# Patient Record
Sex: FEMALE | Race: BLACK OR AFRICAN AMERICAN | NOT HISPANIC OR LATINO | Employment: FULL TIME | URBAN - METROPOLITAN AREA
[De-identification: names, ages, dates, MRNs, and addresses within clinical notes are randomized per-mention and may not be internally consistent; named-entity substitution may affect disease eponyms.]

---

## 2018-03-21 ENCOUNTER — OFFICE VISIT CONVERTED (OUTPATIENT)
Dept: FAMILY MEDICINE CLINIC | Facility: CLINIC | Age: 33
End: 2018-03-21
Attending: NURSE PRACTITIONER

## 2018-06-27 ENCOUNTER — OFFICE VISIT CONVERTED (OUTPATIENT)
Dept: FAMILY MEDICINE CLINIC | Facility: CLINIC | Age: 33
End: 2018-06-27
Attending: NURSE PRACTITIONER

## 2019-03-21 ENCOUNTER — OFFICE VISIT CONVERTED (OUTPATIENT)
Dept: FAMILY MEDICINE CLINIC | Facility: CLINIC | Age: 34
End: 2019-03-21
Attending: NURSE PRACTITIONER

## 2019-04-24 ENCOUNTER — OFFICE VISIT CONVERTED (OUTPATIENT)
Dept: FAMILY MEDICINE CLINIC | Facility: CLINIC | Age: 34
End: 2019-04-24
Attending: NURSE PRACTITIONER

## 2019-04-24 ENCOUNTER — HOSPITAL ENCOUNTER (OUTPATIENT)
Dept: FAMILY MEDICINE CLINIC | Facility: CLINIC | Age: 34
Discharge: HOME | End: 2019-04-24
Attending: NURSE PRACTITIONER

## 2019-04-24 LAB
25(OH)D3 SERPL-MCNC: 19.9 NG/ML (ref 30–100)
ALBUMIN SERPL-MCNC: 4.8 G/DL (ref 3.5–5)
ALBUMIN/GLOB SERPL: 1.6 {RATIO} (ref 1.4–2.6)
ALP SERPL-CCNC: 52 U/L (ref 42–98)
ALT SERPL-CCNC: 14 U/L (ref 10–40)
ANION GAP SERPL CALC-SCNC: 15 MMOL/L (ref 8–19)
AST SERPL-CCNC: 24 U/L (ref 15–50)
BASOPHILS # BLD AUTO: 0.03 10*3/UL (ref 0–0.2)
BASOPHILS NFR BLD AUTO: 0.6 % (ref 0–3)
BILIRUB SERPL-MCNC: 0.37 MG/DL (ref 0.2–1.3)
BUN SERPL-MCNC: 9 MG/DL (ref 5–25)
BUN/CREAT SERPL: 10 {RATIO} (ref 6–20)
CALCIUM SERPL-MCNC: 9.8 MG/DL (ref 8.7–10.4)
CHLORIDE SERPL-SCNC: 104 MMOL/L (ref 99–111)
CK SERPL-CCNC: 99 U/L (ref 35–230)
CONV ABS IMM GRAN: 0 10*3/UL (ref 0–0.2)
CONV CO2: 26 MMOL/L (ref 22–32)
CONV IMMATURE GRAN: 0 % (ref 0–1.8)
CONV TOTAL PROTEIN: 7.8 G/DL (ref 6.3–8.2)
CREAT UR-MCNC: 0.9 MG/DL (ref 0.5–0.9)
DEPRECATED RDW RBC AUTO: 47.5 FL (ref 36.4–46.3)
EOSINOPHIL # BLD AUTO: 0.14 10*3/UL (ref 0–0.7)
EOSINOPHIL # BLD AUTO: 2.7 % (ref 0–7)
ERYTHROCYTE [DISTWIDTH] IN BLOOD BY AUTOMATED COUNT: 14 % (ref 11.7–14.4)
GFR SERPLBLD BASED ON 1.73 SQ M-ARVRAT: >60 ML/MIN/{1.73_M2}
GLOBULIN UR ELPH-MCNC: 3 G/DL (ref 2–3.5)
GLUCOSE SERPL-MCNC: 71 MG/DL (ref 65–99)
HBA1C MFR BLD: 13.4 G/DL (ref 12–16)
HCT VFR BLD AUTO: 42.2 % (ref 37–47)
IRON SATN MFR SERPL: 22 % (ref 20–55)
IRON SERPL-MCNC: 91 UG/DL (ref 60–170)
LYMPHOCYTES # BLD AUTO: 1.51 10*3/UL (ref 1–5)
MCH RBC QN AUTO: 29.3 PG (ref 27–31)
MCHC RBC AUTO-ENTMCNC: 31.8 G/DL (ref 33–37)
MCV RBC AUTO: 92.1 FL (ref 81–99)
MONOCYTES # BLD AUTO: 0.59 10*3/UL (ref 0.2–1.2)
MONOCYTES NFR BLD AUTO: 11.4 % (ref 3–10)
NEUTROPHILS # BLD AUTO: 2.91 10*3/UL (ref 2–8)
NEUTROPHILS NFR BLD AUTO: 56.1 % (ref 30–85)
NRBC CBCN: 0 % (ref 0–0.7)
OSMOLALITY SERPL CALC.SUM OF ELEC: 289 MOSM/KG (ref 273–304)
PLATELET # BLD AUTO: 232 10*3/UL (ref 130–400)
PMV BLD AUTO: 12.2 FL (ref 9.4–12.3)
POTASSIUM SERPL-SCNC: 4 MMOL/L (ref 3.5–5.3)
RBC # BLD AUTO: 4.58 10*6/UL (ref 4.2–5.4)
SODIUM SERPL-SCNC: 141 MMOL/L (ref 135–147)
TIBC SERPL-MCNC: 410 UG/DL (ref 245–450)
TRANSFERRIN SERPL-MCNC: 287 MG/DL (ref 250–380)
TSH SERPL-ACNC: 0.84 M[IU]/L (ref 0.27–4.2)
VARIANT LYMPHS NFR BLD MANUAL: 29.2 % (ref 20–45)
VIT B12 SERPL-MCNC: 1045 PG/ML (ref 211–911)
WBC # BLD AUTO: 5.18 10*3/UL (ref 4.8–10.8)

## 2019-04-25 LAB — CONV ANTI NUCLEAR ANTIBODY WITH REFLEX: NEGATIVE

## 2019-04-30 ENCOUNTER — HOSPITAL ENCOUNTER (OUTPATIENT)
Dept: MRI IMAGING | Facility: HOSPITAL | Age: 34
Discharge: HOME | End: 2019-04-30
Attending: NURSE PRACTITIONER

## 2019-05-29 ENCOUNTER — OFFICE VISIT CONVERTED (OUTPATIENT)
Dept: ORTHOPEDIC SURGERY | Facility: CLINIC | Age: 34
End: 2019-05-29
Attending: ORTHOPAEDIC SURGERY

## 2019-06-12 ENCOUNTER — OFFICE VISIT CONVERTED (OUTPATIENT)
Dept: FAMILY MEDICINE CLINIC | Facility: CLINIC | Age: 34
End: 2019-06-12
Attending: NURSE PRACTITIONER

## 2019-07-17 ENCOUNTER — OFFICE VISIT CONVERTED (OUTPATIENT)
Dept: ORTHOPEDIC SURGERY | Facility: CLINIC | Age: 34
End: 2019-07-17
Attending: ORTHOPAEDIC SURGERY

## 2019-10-06 ENCOUNTER — HOSPITAL ENCOUNTER (OUTPATIENT)
Dept: URGENT CARE | Facility: CLINIC | Age: 34
Discharge: HOME | End: 2019-10-06

## 2019-10-24 ENCOUNTER — HOSPITAL ENCOUNTER (OUTPATIENT)
Dept: FAMILY MEDICINE CLINIC | Facility: CLINIC | Age: 34
Discharge: HOME | End: 2019-10-24
Attending: NURSE PRACTITIONER

## 2019-10-24 ENCOUNTER — OFFICE VISIT CONVERTED (OUTPATIENT)
Dept: FAMILY MEDICINE CLINIC | Facility: CLINIC | Age: 34
End: 2019-10-24
Attending: NURSE PRACTITIONER

## 2019-10-24 LAB — 25(OH)D3 SERPL-MCNC: 30.8 NG/ML (ref 30–100)

## 2019-11-08 ENCOUNTER — HOSPITAL ENCOUNTER (OUTPATIENT)
Dept: URGENT CARE | Facility: CLINIC | Age: 34
Discharge: HOME | End: 2019-11-08

## 2019-11-20 ENCOUNTER — OFFICE VISIT CONVERTED (OUTPATIENT)
Dept: ORTHOPEDIC SURGERY | Facility: CLINIC | Age: 34
End: 2019-11-20
Attending: ORTHOPAEDIC SURGERY

## 2019-11-20 ENCOUNTER — CONVERSION ENCOUNTER (OUTPATIENT)
Dept: ORTHOPEDIC SURGERY | Facility: CLINIC | Age: 34
End: 2019-11-20

## 2019-12-12 ENCOUNTER — CONVERSION ENCOUNTER (OUTPATIENT)
Dept: FAMILY MEDICINE CLINIC | Facility: CLINIC | Age: 34
End: 2019-12-12

## 2019-12-12 ENCOUNTER — OFFICE VISIT CONVERTED (OUTPATIENT)
Dept: FAMILY MEDICINE CLINIC | Facility: CLINIC | Age: 34
End: 2019-12-12
Attending: NURSE PRACTITIONER

## 2019-12-12 ENCOUNTER — HOSPITAL ENCOUNTER (OUTPATIENT)
Dept: FAMILY MEDICINE CLINIC | Facility: CLINIC | Age: 34
Discharge: HOME | End: 2019-12-12
Attending: NURSE PRACTITIONER

## 2019-12-15 LAB
ASO AB SERPL-ACNC: 34 [IU]/ML (ref 0–200)
CONV RHEUMATOID FACTOR IGM: <10 [IU]/ML (ref 0–14)
CRP SERPL-MCNC: 0.5 MG/L (ref 0–5)
DSDNA AB SER-ACNC: NEGATIVE [IU]/ML
ENA AB SER IA-ACNC: NEGATIVE {RATIO}
URATE SERPL-MCNC: 2.9 MG/DL (ref 2.5–7.5)

## 2019-12-27 ENCOUNTER — HOSPITAL ENCOUNTER (OUTPATIENT)
Dept: MRI IMAGING | Facility: HOSPITAL | Age: 34
Discharge: HOME | End: 2019-12-27
Attending: NURSE PRACTITIONER

## 2020-01-15 ENCOUNTER — OFFICE VISIT CONVERTED (OUTPATIENT)
Dept: FAMILY MEDICINE CLINIC | Facility: CLINIC | Age: 35
End: 2020-01-15
Attending: NURSE PRACTITIONER

## 2020-01-18 ENCOUNTER — HOSPITAL ENCOUNTER (OUTPATIENT)
Dept: URGENT CARE | Facility: CLINIC | Age: 35
Discharge: HOME | End: 2020-01-18

## 2020-01-20 LAB — BACTERIA SPEC AEROBE CULT: NORMAL

## 2020-02-04 ENCOUNTER — OFFICE VISIT CONVERTED (OUTPATIENT)
Dept: ORTHOPEDIC SURGERY | Facility: CLINIC | Age: 35
End: 2020-02-04
Attending: PHYSICIAN ASSISTANT

## 2020-02-20 ENCOUNTER — OFFICE VISIT CONVERTED (OUTPATIENT)
Dept: FAMILY MEDICINE CLINIC | Facility: CLINIC | Age: 35
End: 2020-02-20
Attending: NURSE PRACTITIONER

## 2020-02-20 ENCOUNTER — HOSPITAL ENCOUNTER (OUTPATIENT)
Dept: FAMILY MEDICINE CLINIC | Facility: CLINIC | Age: 35
Discharge: HOME | End: 2020-02-20
Attending: NURSE PRACTITIONER

## 2020-02-20 LAB
T4 FREE SERPL-MCNC: 1.2 NG/DL (ref 0.9–1.8)
TSH SERPL-ACNC: 1.22 M[IU]/L (ref 0.27–4.2)

## 2020-03-11 ENCOUNTER — CONVERSION ENCOUNTER (OUTPATIENT)
Dept: NEUROLOGY | Facility: CLINIC | Age: 35
End: 2020-03-11

## 2020-03-11 ENCOUNTER — OFFICE VISIT CONVERTED (OUTPATIENT)
Dept: NEUROLOGY | Facility: CLINIC | Age: 35
End: 2020-03-11
Attending: PSYCHIATRY & NEUROLOGY

## 2020-03-17 ENCOUNTER — OFFICE VISIT CONVERTED (OUTPATIENT)
Dept: ORTHOPEDIC SURGERY | Facility: CLINIC | Age: 35
End: 2020-03-17
Attending: PHYSICIAN ASSISTANT

## 2020-04-24 ENCOUNTER — HOSPITAL ENCOUNTER (OUTPATIENT)
Dept: URGENT CARE | Facility: CLINIC | Age: 35
Discharge: HOME | End: 2020-04-24
Attending: EMERGENCY MEDICINE

## 2020-06-19 ENCOUNTER — OFFICE VISIT CONVERTED (OUTPATIENT)
Dept: ORTHOPEDIC SURGERY | Facility: CLINIC | Age: 35
End: 2020-06-19
Attending: PHYSICIAN ASSISTANT

## 2020-08-20 ENCOUNTER — OFFICE VISIT CONVERTED (OUTPATIENT)
Dept: FAMILY MEDICINE CLINIC | Facility: CLINIC | Age: 35
End: 2020-08-20
Attending: NURSE PRACTITIONER

## 2020-09-23 ENCOUNTER — OFFICE VISIT CONVERTED (OUTPATIENT)
Dept: FAMILY MEDICINE CLINIC | Facility: CLINIC | Age: 35
End: 2020-09-23
Attending: NURSE PRACTITIONER

## 2020-10-04 ENCOUNTER — HOSPITAL ENCOUNTER (OUTPATIENT)
Dept: URGENT CARE | Facility: CLINIC | Age: 35
Discharge: HOME | End: 2020-10-04

## 2020-10-06 LAB
BACTERIA SPEC AEROBE CULT: NORMAL
SARS-COV-2 RNA SPEC QL NAA+PROBE: NOT DETECTED

## 2021-01-25 ENCOUNTER — HOSPITAL ENCOUNTER (OUTPATIENT)
Dept: URGENT CARE | Facility: CLINIC | Age: 36
Discharge: HOME | End: 2021-01-25
Attending: PHYSICIAN ASSISTANT

## 2021-01-27 LAB — SARS-COV-2 RNA SPEC QL NAA+PROBE: NOT DETECTED

## 2021-02-24 ENCOUNTER — OFFICE VISIT CONVERTED (OUTPATIENT)
Dept: FAMILY MEDICINE CLINIC | Facility: CLINIC | Age: 36
End: 2021-02-24
Attending: NURSE PRACTITIONER

## 2021-02-24 ENCOUNTER — HOSPITAL ENCOUNTER (OUTPATIENT)
Dept: FAMILY MEDICINE CLINIC | Facility: CLINIC | Age: 36
Discharge: HOME | End: 2021-02-24
Attending: NURSE PRACTITIONER

## 2021-02-24 LAB
ALBUMIN SERPL-MCNC: 4.4 G/DL (ref 3.5–5)
ALBUMIN/GLOB SERPL: 1.7 {RATIO} (ref 1.4–2.6)
ALP SERPL-CCNC: 49 U/L (ref 42–98)
ALT SERPL-CCNC: 11 U/L (ref 10–40)
ANION GAP SERPL CALC-SCNC: 13 MMOL/L (ref 8–19)
AST SERPL-CCNC: 23 U/L (ref 15–50)
BASOPHILS # BLD AUTO: 0.03 10*3/UL (ref 0–0.2)
BASOPHILS NFR BLD AUTO: 0.7 % (ref 0–3)
BILIRUB SERPL-MCNC: 0.35 MG/DL (ref 0.2–1.3)
BUN SERPL-MCNC: 13 MG/DL (ref 5–25)
BUN/CREAT SERPL: 14 {RATIO} (ref 6–20)
CALCIUM SERPL-MCNC: 9.5 MG/DL (ref 8.7–10.4)
CHLORIDE SERPL-SCNC: 105 MMOL/L (ref 99–111)
CHOLEST SERPL-MCNC: 190 MG/DL (ref 107–200)
CHOLEST/HDLC SERPL: 1.9 {RATIO} (ref 3–6)
CONV ABS IMM GRAN: 0.01 10*3/UL (ref 0–0.2)
CONV CO2: 26 MMOL/L (ref 22–32)
CONV IMMATURE GRAN: 0.2 % (ref 0–1.8)
CONV TOTAL PROTEIN: 7 G/DL (ref 6.3–8.2)
CREAT UR-MCNC: 0.91 MG/DL (ref 0.5–0.9)
DEPRECATED RDW RBC AUTO: 46.7 FL (ref 36.4–46.3)
EOSINOPHIL # BLD AUTO: 0.1 10*3/UL (ref 0–0.7)
EOSINOPHIL # BLD AUTO: 2.3 % (ref 0–7)
ERYTHROCYTE [DISTWIDTH] IN BLOOD BY AUTOMATED COUNT: 13.7 % (ref 11.7–14.4)
GFR SERPLBLD BASED ON 1.73 SQ M-ARVRAT: >60 ML/MIN/{1.73_M2}
GLOBULIN UR ELPH-MCNC: 2.6 G/DL (ref 2–3.5)
GLUCOSE SERPL-MCNC: 78 MG/DL (ref 65–99)
HCT VFR BLD AUTO: 38.4 % (ref 37–47)
HDLC SERPL-MCNC: 98 MG/DL (ref 40–60)
HGB BLD-MCNC: 12.5 G/DL (ref 12–16)
LDLC SERPL CALC-MCNC: 85 MG/DL (ref 70–100)
LYMPHOCYTES # BLD AUTO: 1.36 10*3/UL (ref 1–5)
LYMPHOCYTES NFR BLD AUTO: 31.2 % (ref 20–45)
MCH RBC QN AUTO: 29.8 PG (ref 27–31)
MCHC RBC AUTO-ENTMCNC: 32.6 G/DL (ref 33–37)
MCV RBC AUTO: 91.6 FL (ref 81–99)
MONOCYTES # BLD AUTO: 0.56 10*3/UL (ref 0.2–1.2)
MONOCYTES NFR BLD AUTO: 12.8 % (ref 3–10)
NEUTROPHILS # BLD AUTO: 2.3 10*3/UL (ref 2–8)
NEUTROPHILS NFR BLD AUTO: 52.8 % (ref 30–85)
NRBC CBCN: 0 % (ref 0–0.7)
OSMOLALITY SERPL CALC.SUM OF ELEC: 289 MOSM/KG (ref 273–304)
PLATELET # BLD AUTO: 270 10*3/UL (ref 130–400)
PMV BLD AUTO: 11.3 FL (ref 9.4–12.3)
POTASSIUM SERPL-SCNC: 4.2 MMOL/L (ref 3.5–5.3)
RBC # BLD AUTO: 4.19 10*6/UL (ref 4.2–5.4)
SODIUM SERPL-SCNC: 140 MMOL/L (ref 135–147)
TRIGL SERPL-MCNC: 35 MG/DL (ref 40–150)
TSH SERPL-ACNC: 0.91 M[IU]/L (ref 0.27–4.2)
VLDLC SERPL-MCNC: 7 MG/DL (ref 5–37)
WBC # BLD AUTO: 4.36 10*3/UL (ref 4.8–10.8)

## 2021-03-24 ENCOUNTER — OFFICE VISIT CONVERTED (OUTPATIENT)
Dept: FAMILY MEDICINE CLINIC | Facility: CLINIC | Age: 36
End: 2021-03-24
Attending: NURSE PRACTITIONER

## 2021-04-15 ENCOUNTER — HOSPITAL ENCOUNTER (OUTPATIENT)
Dept: CARDIOLOGY | Facility: HOSPITAL | Age: 36
Discharge: HOME | End: 2021-04-15
Attending: PSYCHIATRY & NEUROLOGY

## 2021-04-29 ENCOUNTER — HOSPITAL ENCOUNTER (OUTPATIENT)
Dept: MRI IMAGING | Facility: HOSPITAL | Age: 36
Discharge: HOME | End: 2021-04-29
Attending: PSYCHIATRY & NEUROLOGY

## 2021-05-13 NOTE — PROGRESS NOTES
"   Progress Note      Patient Name: Laurel Molina   Patient ID: [id number]   Sex: Female   YOB: 1985    Primary Care Provider: Rula MUSE   Referring Provider: Babita Adhikari MD    Visit Date: August 20, 2020    Provider: MICHA Muñiz   Location: [facility]   Location Address: [address] [place] [address]  [id number]   Location Phone: [phone]          Chief Complaint  · 6 Month Follow up for Anxiety and Depression      History Of Present Illness  Laurel Molina is a 35 year old /Black female who presents for evaluation and treatment of:      6 Month Follow up for Anxiety and Depression  Last lab work done 2/2020  Med refills needed    Pt scored a 18 on in-office depression screen today.  pt reports she is working still with the loss of her father.  pt reports difficulty with focus at work. \"no energy to do anything\".  pt has failed Vraylar and Zoloft in the past.     Cymbalta has helped with the chronic pain.            Past Medical History  Disease Name Date Onset Notes   Acid reflux --  --    Allergic rhinitis --  --    Anxiety and depression --  --    Arthritis --  --    Leg pain --  --    Seasonal allergies --  --    Sinus trouble --  --    Sleep disturbance --  --    Thumb fracture 2012 Right          Past Surgical History  Procedure Name Date Notes   Joint Surgery --  --    Knee reconstruction, open, ligament 2014 Left          Medication List  Name Date Started Instructions   clindamycin-benzoyl peroxide 1-5 % topical gel with pump 04/24/2019 apply to the affected area(s) by topical route 2 times per day in the morning and evening for 30 days   duloxetine 60 mg oral capsule,delayed release(DR/EC) 02/20/2020 take 2 capsules (120 mg) by oral route once daily for 90 days   hydroxyzine HCl 25 mg oral tablet 07/07/2020 TAKE 1 TABLET BY MOUTH AT BEDTIME AS NEEDED   ibuprofen 800 mg oral tablet 11/20/2019 TAKE 1 TABLET BY MOUTH TWICE A DAY " "  Zyrtec 10 mg oral tablet 03/21/2018 take 1 tablet by oral route once a day (at bedtime) for 90 days         Allergy List  Allergen Name Date Reaction Notes   naproxen --  --  --    NSAIDS --  --  --          Family Medical History  Disease Name Relative/Age Notes   Family history of Arthritis Brother/  Father/  Mother/   Mother; Father; Brother  Mother; Father   Family history of heart disease Brother/  Father/   Father; Brother   Family history of diabetes mellitus Brother/   Brother         Social History  Finding Status Start/Stop Quantity Notes   Alcohol Never --/-- --  03/11/2020 - does not drink   Alcohol Use Never --/-- --  does not drink   lives with parents --  --/-- --  --    Recreational Drug Use Never --/-- --  no   Single --  --/-- --  --    Single. --  --/-- --  --    Tobacco Never --/-- --  never smoker   Working --  --/-- --  --          Review of Systems  · Constitutional  o Denies  o : fatigue, fever, weight gain, weight loss, chills  · Cardiovascular  o Denies  o : chest Pain, palpitations, edema (swelling)  · Respiratory  o Denies  o : frequent cough, shortness of breath  · Gastrointestinal  o Denies  o : nausea, vomiting, changes in bowel habits  · Genitourinary  o Denies  o : dysuria, urinary frequency, urinary urgency, polyuria  · Psychiatric  o Admits  o : anxiety, depression  o Denies  o : mood changes, memory changes, SI/HI  · Allergic-Immunologic  o Admits  o : seasonal allergies  o Denies  o : eczema, urticaria      Vitals  Date Time BP Position Site L\R Cuff Size HR RR TEMP (F) WT  HT  BMI kg/m2 BSA m2 O2 Sat HC       02/20/2020 08:46 /79 Sitting    88 - R 12  129lbs 0oz 5'  3.5\" 22.49 1.62 99 %    03/11/2020 03:16 /86 Sitting    91 - R   126lbs 16oz 5'  3\" 22.5 1.6     08/20/2020 08:18 /86 Sitting    112 - R 12 98.3 129lbs 0oz 5'  3.5\" 22.49 1.62 100 %          Physical Examination  · Constitutional  o Appearance  o : well-nourished, in no acute " distress  · Eyes  o Conjunctivae  o : conjunctivae normal  o Sclerae  o : sclerae white  o Pupils and Irises  o : pupils equal and round  o Eyelids/Ocular Adnexae  o : eyelid appearance normal, no exudates present  · Neck  o Inspection/Palpation  o : normal appearance, no masses or tenderness, trachea midline  o Range of Motion  o : cervical range of motion within normal limits  o Thyroid  o : gland size normal, nontender, no nodules or masses present on palpation  · Respiratory  o Respiratory Effort  o : breathing unlabored  o Inspection of Chest  o : normal appearance  o Auscultation of Lungs  o : normal breath sounds throughout inspiration and expiration  · Cardiovascular  o Heart  o :   § Auscultation of Heart  § : regular rate and rhythm, no murmurs, gallops or rubs  o Peripheral Vascular System  o :   § Carotid Arteries  § : normal pulses bilaterally, no bruits present  · Skin and Subcutaneous Tissue  o General Inspection  o : no rashes or lesions present, no areas of discoloration  o Body Hair  o : hair normal for age, general body hair distribution normal for age  o Digits and Nails  o : no clubbing, cyanosis, deformities or edema present, normal appearing nails  · Neurologic  o Mental Status Examination  o :   § Orientation  § : grossly oriented to person, place and time  o Gait and Station  o : normal gait, able to stand without difficulty  · Psychiatric  o Judgement and Insight  o : judgment and insight intact  o Mood and Affect  o : mood normal, affect appropriate          Assessment  · Screening for depression     V79.0/Z13.89  · Anxiety disorder     300.00/F41.9  · Depression     311/F32.9      Plan  · Orders  o ACO-39: Current medications updated and reviewed () - - 08/20/2020  o ACO-18: Positive screen for clinical depression using a standardized tool and a follow-up plan documented () - - 08/20/2020  · Medications  o Wellbutrin  mg oral tablet extended release 24 hr   SIG: take 1  tablet (150 mg) by oral route once daily for 30 days   DISP: (30) tablets with 1 refills  Prescribed on 08/20/2020     o duloxetine 60 mg oral capsule,delayed release(DR/EC)   SIG: take 2 capsules (120 mg) by oral route once daily for 90 days   DISP: (180) Capsule with 1 refills  Refilled on 08/20/2020     o hydroxyzine HCl 25 mg oral tablet   SIG: TAKE 1 TABLET BY MOUTH AT BEDTIME AS NEEDED   DISP: (90) Tablet with 1 refills  Refilled on 08/20/2020     o meclizine 25 mg oral tablet   SIG: TAKE 1 TABLET BY MOUTH 3 TIMES A DAY AS NEEDED FOR 10 DAYS   DISP: (30) Tablet with 0 refills  Discontinued on 08/20/2020     o Medications have been Reconciled  o Transition of Care or Provider Policy  · Instructions  o Depression Screen completed and scanned into the EMR under the designated folder within the patient's documents.  o Today's PHQ-9 result is _18__  o Patient was given an SSRI/SSNRI medication and warned of possible side effects of the medication including potential for increased risk of suicidal thoughts and feelings. Patient was instructed that if they begin to exhibit any of these effects they will discontinue the medication immediately and contact our office or the ER ASAP.  o Patient was given an SSRI/SSNRI medication and warned of possible side effects of the medication including potential for increased risk of suicidal thoughts and feelings. Patient was instructed that if they begin to exhibit any of these effects they will discontinue the medication immediately and contact our office or the ER ASAP.  o Patient was educated/instructed on their diagnosis, treatment and medications prior to discharge from the clinic today.  o Call the office with any concerns or questions.  · Disposition  o Return to Office in 1 month.            Electronically Signed by: MICHA Muñiz -Author on August 20, 2020 08:35:19 AM

## 2021-05-13 NOTE — PROGRESS NOTES
Progress Note      Patient Name: Laurel Molina   Patient ID: [id number]   Sex: Female   YOB: 1985    Primary Care Provider: Rula MUSE   Referring Provider: Babita Adhikari MD    Visit Date: September 23, 2020    Provider: MICHA Muñiz   Location: [facility]   Location Address: [address] [place] [address]  [id number]   Location Phone: [phone]          Chief Complaint  · 1 Month Follow up for Anxiety and Depression      History Of Present Illness  Laurel Molina is a 35 year old /Black female who presents for evaluation and treatment of:      1 Month Follow up for Anxiety and Depression  Pt reported doing well on current medications    pt reports anxiety depression well controlled. pt reports taking Cymbalta at night and Wellbutrin qam. pt is functioning well. sleeping approx. 5-6 hours per night.     Left knee pain - chronic. pt takes ibuprofen with relief.       Past Medical History  Disease Name Date Onset Notes   Acid reflux --  --    Allergic rhinitis --  --    Anxiety and depression --  --    Arthritis --  --    Leg pain --  --    Seasonal allergies --  --    Sinus trouble --  --    Sleep disturbance --  --    Thumb fracture 2012 Right          Past Surgical History  Procedure Name Date Notes   Joint Surgery --  --    Knee reconstruction, open, ligament 2014 Left          Medication List  Name Date Started Instructions   clindamycin-benzoyl peroxide 1-5 % topical gel with pump 04/24/2019 apply to the affected area(s) by topical route 2 times per day in the morning and evening for 30 days   duloxetine 60 mg oral capsule,delayed release(DR/EC) 08/20/2020 take 2 capsules (120 mg) by oral route once daily for 90 days   hydroxyzine HCl 25 mg oral tablet 08/20/2020 TAKE 1 TABLET BY MOUTH AT BEDTIME AS NEEDED   ibuprofen 800 mg oral tablet 11/20/2019 TAKE 1 TABLET BY MOUTH TWICE A DAY   Wellbutrin  mg oral tablet  "extended release 24 hr 08/20/2020 take 1 tablet (150 mg) by oral route once daily for 30 days   Zyrtec 10 mg oral tablet 03/21/2018 take 1 tablet by oral route once a day (at bedtime) for 90 days         Allergy List  Allergen Name Date Reaction Notes   naproxen --  --  --    NSAIDS --  --  --          Family Medical History  Disease Name Relative/Age Notes   Family history of Arthritis Brother/  Father/  Mother/   Mother; Father; Brother  Mother; Father   Family history of heart disease Brother/  Father/   Father; Brother   Family history of diabetes mellitus Brother/   Brother         Social History  Finding Status Start/Stop Quantity Notes   Alcohol Never --/-- --  03/11/2020 - does not drink   Alcohol Use Never --/-- --  does not drink   lives with parents --  --/-- --  --    Recreational Drug Use Never --/-- --  no   Single --  --/-- --  --    Tobacco Never --/-- --  never smoker   Working --  --/-- --  --          Review of Systems  · Constitutional  o Denies  o : fatigue, fever, weight gain, weight loss, chills  · Cardiovascular  o Denies  o : chest Pain, palpitations, edema (swelling)  · Respiratory  o Denies  o : frequent cough, shortness of breath  · Gastrointestinal  o Denies  o : nausea, vomiting, changes in bowel habits  · Genitourinary  o Denies  o : dysuria, urinary frequency, urinary urgency, polyuria  · Neurologic  o Denies  o : headache, tingling or numbness, dizziness  · Musculoskeletal  o Admits  o : knee pain  o Denies  o : joint pain, myalgias  · Endocrine  o Denies  o : polydipsia, polyphagia  · Psychiatric  o Admits  o : anxiety, depression  o Denies  o : difficulty sleeping, mood changes, memory changes, SI/HI  · Allergic-Immunologic  o Denies  o : eczema, seasonal allergies, urticaria      Vitals  Date Time BP Position Site L\R Cuff Size HR RR TEMP (F) WT  HT  BMI kg/m2 BSA m2 O2 Sat        08/20/2020 08:18 /86 Sitting    112 - R 12 98.3 129lbs 0oz 5'  3.5\" 22.49 1.62 100 %  " "  09/23/2020 08:30 /83 Sitting    92 - R 12 98.4 128lbs 2oz 5'  3.5\" 22.34 1.61 99 %          Physical Examination  · Constitutional  o Appearance  o : well-nourished, in no acute distress  · Eyes  o Conjunctivae  o : conjunctivae normal  o Sclerae  o : sclerae white  o Pupils and Irises  o : pupils equal and round  o Eyelids/Ocular Adnexae  o : eyelid appearance normal, no exudates present  · Neck  o Inspection/Palpation  o : normal appearance, no masses or tenderness, trachea midline  o Range of Motion  o : cervical range of motion within normal limits  o Thyroid  o : gland size normal, nontender, no nodules or masses present on palpation  · Respiratory  o Respiratory Effort  o : breathing unlabored  o Inspection of Chest  o : normal appearance  o Auscultation of Lungs  o : normal breath sounds throughout inspiration and expiration  · Cardiovascular  o Heart  o :   § Auscultation of Heart  § : regular rate and rhythm, no murmurs, gallops or rubs  · Musculoskeletal  o Spine  o :   § Range of Motion  § : spine range of motion normal  o Right Lower Extremity  o :   § Inspection/Palpation  § : no joint or limb tenderness to palpation, ROM normal  o Left Lower Extremity  o :   § Inspection/Palpation  § : moderate tenderness to palpation present-knee, neurovascularly intact to L1-S1  · Skin and Subcutaneous Tissue  o General Inspection  o : no rashes or lesions present, no areas of discoloration  o Body Hair  o : hair normal for age, general body hair distribution normal for age  o Digits and Nails  o : no clubbing, cyanosis, deformities or edema present, normal appearing nails  · Neurologic  o Mental Status Examination  o :   § Orientation  § : grossly oriented to person, place and time  o Gait and Station  o : normal gait, able to stand without difficulty  · Psychiatric  o Judgement and Insight  o : judgment and insight intact  o Mood and Affect  o : mood normal, affect appropriate          Assessment  · Anxiety " disorder     300.00/F41.9  · Depression     311/F32.9  · Routine lab draw     V72.60/Z01.89      Plan  · Orders  o Physical, Primary Care Panel (CBC, CMP, Lipid, TSH) [facility] ([id number], [id number], [id number], [id number]) - V72.60/Z01.89 - 09/23/2020  o ACO-39: Current medications updated and reviewed () - - 09/23/2020  · Medications  o ibuprofen 800 mg oral tablet   SIG: TAKE 1 TABLET BY MOUTH TWICE A DAY PRN   DISP: (60) Tablet with 2 refills  Adjusted on 09/23/2020     o Wellbutrin  mg oral tablet extended release 24 hr   SIG: take 1 tablet (150 mg) by oral route once daily for 90 days   DISP: (90) tablets with 1 refills  Adjusted on 09/23/2020     o Medications have been Reconciled  o Transition of Care or Provider Policy  · Instructions  o Patient was given an SSRI/SSNRI medication and warned of possible side effects of the medication including potential for increased risk of suicidal thoughts and feelings. Patient was instructed that if they begin to exhibit any of these effects they will discontinue the medication immediately and contact our office or the ER ASAP.  o Patient was educated/instructed on their diagnosis, treatment and medications prior to discharge from the clinic today.  · Disposition  o Return Visit Request in/on 5 months +/- 7 days ([id number]).            Electronically Signed by: MICHA Muñiz -Author on September 23, 2020 08:49:02 AM

## 2021-05-13 NOTE — PROGRESS NOTES
Progress Note      Patient Name: Laurel Molina   Patient ID: [id number]   Sex: Female   YOB: 1985    Primary Care Provider: Rula MUSE   Referring Provider: Babita Adhikari MD    Visit Date: June 19, 2020    Provider: Deborah Max PA-C   Location: [facility]   Location Address: [address] [place] [address]  [id number]   Location Phone: [phone]          Chief Complaint  · Follow up left quad / left shoulder pain      History Of Present Illness  Laurel Molina is a 35 year old /Black female who presents today to [facility].      Patient is following up for left knee pain, left shoulder pain. Patient states history of left knee MPFL performed by Dr. Sheridan. Patient states pain is decreasing. Patient states steroid injection provided relief in left shoulder. Patient has had an MRI result partial thickness rotator cuff tear.              Past Medical History  Acid reflux; Allergic rhinitis; Anxiety and depression; Arthritis; Leg pain; Seasonal allergies; Sinus trouble; Sleep disturbance; Thumb fracture         Past Surgical History  Joint Surgery; Knee reconstruction, open, ligament         Medication List  clindamycin-benzoyl peroxide 1-5 % topical gel with pump; duloxetine 60 mg oral capsule,delayed release(DR/EC); hydroxyzine HCl 25 mg oral tablet; ibuprofen 800 mg oral tablet; meclizine 25 mg oral tablet; Zyrtec 10 mg oral tablet         Allergy List  naproxen; NSAIDS         Family Medical History  Heart Disease; *No Known Family History; Family history of Arthritis; Family history of heart disease; Family history of diabetes mellitus         Social History  Alcohol (Never); Alcohol Use (Never); lives with parents; Recreational Drug Use (Never); Single; Tobacco (Never); Working         Review of Systems  · Constitutional  o Denies  o : fever, chills, weight loss  · Cardiovascular  o Denies  o : chest pain, shortness of  "breath  · Gastrointestinal  o Denies  o : liver disease, heartburn, nausea, blood in stools  · Genitourinary  o Denies  o : painful urination, blood in urine  · Integument  o Denies  o : rash, itching  · Neurologic  o Denies  o : headache, weakness, loss of consciousness  · Musculoskeletal  o Denies  o : painful, swollen joints  · Psychiatric  o Denies  o : drug/alcohol addiction, anxiety, depression      Vitals  Date Time BP Position Site L\R Cuff Size HR RR TEMP (F) WT  HT  BMI kg/m2 BSA m2 O2 Sat        06/19/2020 08:36 AM      105 - R   125lbs 0oz 5'  3.5\" 21.8 1.59 99 %          Physical Examination  · Constitutional  o Appearance  o : well developed, well-nourished, no obvious deformities present  · Head and Face  o Head  o :   § Inspection  § : normocephalic  o Face  o :   § Inspection  § : no facial lesions  · Eyes  o Conjunctivae  o : conjunctivae normal  o Sclerae  o : sclerae white  · Ears, Nose, Mouth and Throat  o Ears  o :   § External Ears  § : appearance within normal limits  § Hearing  § : intact  o Nose  o :   § External Nose  § : appearance normal  · Neck  o Inspection/Palpation  o : normal appearance  o Range of Motion  o : full range of motion  · Respiratory  o Respiratory Effort  o : breathing unlabored  o Inspection of Chest  o : normal appearance  o Auscultation of Lungs  o : no audible wheezing or rales  · Cardiovascular  o Heart  o : regular rate  · Gastrointestinal  o Abdominal Examination  o : soft and non-tender  · Skin and Subcutaneous Tissue  o General Inspection  o : intact, no rashes  · Psychiatric  o General  o : Alert and oriented x3  o Judgement and Insight  o : judgment and insight intact  o Mood and Affect  o : mood normal, affect appropriate  · Left Knee-Street  o Inspection  o : scars well healed, no limping gait, weight bearing, no swelling, no ecchymosis, quadriceps atrophy, neutral alignment  o Palpation  o : no medial joint line tenderness, no lateral joint line " tenderness, no patellar tendon tenderness, no pain of MCL, no pain at LCL  o ROM  o : full extension, full flexion  o Strength  o : full extension, full flexion  o Special Tests  o : negative ballotable effusion , negtive fluid wave, negative patellar compression, negative patellar apprehenison, negative Erika's test, negative Apley's test, negative anterior drawer, negative posterior drawer, negative lachman's drawer , negative varus stress, negaitve valgus stress  o Neurovascular  o : Full sensation, Dorsal Pedal Pulse 2+, posteriror tibialis pulse 2+  · Left Shoulder Street  o Inspection  o : No swelling, no erythema, no ecchymosis, no atrophy  o Palpation  o : no tenderness at sternoclavicular joint, no tenderness at clavicle, no tenderness of acromioclavicular joint, no tenderness at greater tuberosity, no tenderness at subacromial bursae, no tenderness at biciptal groove  o ROM  o : Full extension, full flexion, full abduction, full internal rotation, full external rotation, no winging of scapula, no scapular dyskinesis, full cervical ROM , full cross body adduction  o Strength  o : full extension, full flexion, full abduction, full external rotation, full internal rotation, full empty can test, full lift off test, full drop arm test  o Special Tests  o : negative neer's test, negative Hawkin's test, negative Speed's test, negative O'Briens test, negative crank test, negative apprehnension test, negative relocation manuever  o Neurovascular  o : Full sensation, radial pulse 2+, ulnar pulse 2+          Assessment  · Pain: Leg     729.5/M79.606  · Pain: Shoulder     719.41/M25.519      Plan  · Medications  o Medications have been Reconciled  o Transition of Care or Provider Policy  · Instructions  o Reviewed the patient's Past Medical, Social, and Family history as well as the ROS at today's visit, no changes.  o Call or return if worsening symptoms.  o Follow Up PRN.  o Electronically Identified Patient  Education Materials Provided Electronically            Electronically Signed by: Deborah Max PA-C -Author on June 19, 2020 09:30:57 AM  Electronically Co-signed by: Babita Adhikari MD -Reviewer on June 23, 2020 08:47:45 AM

## 2021-05-14 VITALS
DIASTOLIC BLOOD PRESSURE: 71 MMHG | BODY MASS INDEX: 23.05 KG/M2 | WEIGHT: 130.12 LBS | OXYGEN SATURATION: 100 % | HEIGHT: 63 IN | HEART RATE: 100 BPM | TEMPERATURE: 98.4 F | RESPIRATION RATE: 12 BRPM | SYSTOLIC BLOOD PRESSURE: 106 MMHG

## 2021-05-14 VITALS
HEART RATE: 108 BPM | HEIGHT: 63 IN | WEIGHT: 129.5 LBS | DIASTOLIC BLOOD PRESSURE: 90 MMHG | SYSTOLIC BLOOD PRESSURE: 121 MMHG | TEMPERATURE: 98.9 F | BODY MASS INDEX: 22.95 KG/M2 | OXYGEN SATURATION: 100 % | RESPIRATION RATE: 12 BRPM

## 2021-05-14 VITALS
RESPIRATION RATE: 12 BRPM | DIASTOLIC BLOOD PRESSURE: 83 MMHG | TEMPERATURE: 98.4 F | SYSTOLIC BLOOD PRESSURE: 119 MMHG | OXYGEN SATURATION: 99 % | BODY MASS INDEX: 22.7 KG/M2 | HEIGHT: 63 IN | WEIGHT: 128.12 LBS | HEART RATE: 92 BPM

## 2021-05-14 NOTE — PROGRESS NOTES
Progress Note      Patient Name: Laurel Molina   Patient ID: [id number]   Sex: Female   YOB: 1985    Primary Care Provider: Rula MUSE   Referring Provider: Babita Adhikari MD    Visit Date: February 24, 2021    Provider: MICHA Muñiz   Location: [facility]   Location Address: [address] [place] [address]  [id number]   Location Phone: [phone]          Chief Complaint  · 5 Month Follow up for Anxiety, Depression, and Chronic Pain      History Of Present Illness  Laurel Molina is a 35 year old /Black female who presents for evaluation and treatment of:      5 Month Follow up for Anxiety, Depression, and Chronic Pain  Last lab work done 2/2020, Orders pending from 9/2020. Re-ordered to be drawn today.  Med refills needed    Pt c/o BP has been elevated due to stress. reports b/p at home 135/92.    Having twitching in Lt leg sometimes. Also happens in both arms. Pt reports she is sleeping well approx. 7 hours. ongoing for 2-3 weeks. reports these are involuntary movements.     Pt c/o nose bleeds more frequently this week. Previously had to cauterize the vessels in her nose with ENT 10 yrs ago.    flu shot- declined  Pap-few yrs ago. Pt advised to schedule well woman exam.         Past Medical History  Disease Name Date Onset Notes   Acid reflux --  --    Allergic rhinitis --  --    Anxiety and depression --  --    Arthritis --  --    Leg pain --  --    Seasonal allergies --  --    Sinus trouble --  --    Sleep disturbance --  --    Thumb fracture 2012 Right          Past Surgical History  Procedure Name Date Notes   Joint Surgery --  --    Knee reconstruction, open, ligament 2014 Left          Medication List  Name Date Started Instructions   buspirone 5 mg oral tablet 02/24/2021 take 1 tablet (5 mg) by oral route 3 times per day for 30 days   clindamycin-benzoyl peroxide 1-5 % topical gel with pump 04/24/2019 apply to the  affected area(s) by topical route 2 times per day in the morning and evening for 30 days   duloxetine 60 mg oral capsule,delayed release(DR/EC) 02/24/2021 take 2 capsules (120 mg) by oral route once daily for 90 days   hydrochlorothiazide 12.5 mg oral tablet 02/24/2021 take 1 tablet by oral route once a day (in the morning) for 30 days   hydroxyzine HCl 25 mg oral tablet 02/24/2021 TAKE 1 TABLET BY MOUTH AT BEDTIME AS NEEDED   ibuprofen 800 mg oral tablet 09/23/2020 TAKE 1 TABLET BY MOUTH TWICE A DAY PRN   Wellbutrin  mg oral tablet extended release 24 hr 02/24/2021 take 1 tablet (150 mg) by oral route once daily for 90 days   Zyrtec 10 mg oral tablet 03/21/2018 take 1 tablet by oral route once a day (at bedtime) for 90 days         Allergy List  Allergen Name Date Reaction Notes   naproxen --  --  --    NSAIDS --  --  --          Family Medical History  Disease Name Relative/Age Notes   Family history of Arthritis Brother/  Father/  Mother/   Mother; Father; Brother  Mother; Father   Family history of heart disease Brother/  Father/   Father; Brother   Family history of diabetes mellitus Brother/   Brother         Social History  Finding Status Start/Stop Quantity Notes   Alcohol Never --/-- --  03/11/2020 - does not drink   Alcohol Use Never --/-- --  does not drink   lives with parents --  --/-- --  --    Recreational Drug Use Never --/-- --  no   Single --  --/-- --  --    Tobacco Never --/-- --  never smoker   Working --  --/-- --  --          Review of Systems  · Constitutional  o Denies  o : fatigue, fever, weight gain, weight loss, chills  · HENT  o Admits  o : nose bleeds  o Denies  o : ear pain, sore throat  · Cardiovascular  o Denies  o : chest Pain, palpitations, edema (swelling)  · Respiratory  o Denies  o : frequent cough, shortness of breath  · Gastrointestinal  o Denies  o : nausea, vomiting, changes in bowel habits  · Genitourinary  o Denies  o : dysuria, urinary frequency, urinary urgency,  "polyuria  · Neurologic  o Denies  o : headache, tingling or numbness, dizziness  · Musculoskeletal  o Admits  o : joint pain, knee pain  o Denies  o : myalgias  · Psychiatric  o Admits  o : anxiety, depression  o Denies  o : mood changes, memory changes, SI/HI  · Allergic-Immunologic  o Denies  o : eczema, seasonal allergies, urticaria      Vitals  Date Time BP Position Site L\R Cuff Size HR RR TEMP (F) WT  HT  BMI kg/m2 BSA m2 O2 Sat FR L/min FiO2 HC       08/20/2020 08:18 /86 Sitting    112 - R 12 98.3 129lbs 0oz 5'  3.5\" 22.49 1.62 100 %      09/23/2020 08:30 /83 Sitting    92 - R 12 98.4 128lbs 2oz 5'  3.5\" 22.34 1.61 99 %      02/24/2021 08:28 /90 Sitting    108 - R 12 98.9 129lbs 8oz 5'  3.5\" 22.58 1.62 100 %            Physical Examination  · Constitutional  o Appearance  o : well-nourished, in no acute distress  · Eyes  o Conjunctivae  o : conjunctivae normal  o Sclerae  o : sclerae white  o Pupils and Irises  o : pupils equal and round  o Eyelids/Ocular Adnexae  o : eyelid appearance normal, no exudates present  · Ears, Nose, Mouth and Throat  o Ears  o :   § External Ears  § : external auditory canal appearance within normal limits, no discharge present  § Otoscopic Examination  § : tympanic membrane appearance within normal limits bilaterally, cerumen not present  o Nose  o :   § External Nose  § : appearance normal  § Intranasal Exam  § : miryam inner nare excoriations  § Nasopharynx  § : no discharge present  o Oral Cavity  o :   § Oral Mucosa  § : oral mucosa normal  § Lips  § : lip appearance normal  § Teeth  § : normal dentation for age  o Throat  o :   § Oropharynx  § : no inflammation or lesions present, tonsils within normal limits  · Neck  o Inspection/Palpation  o : normal appearance, no masses or tenderness, trachea midline  o Range of Motion  o : cervical range of motion within normal limits  o Thyroid  o : gland size normal, nontender, no nodules or masses present on " palpation  · Respiratory  o Respiratory Effort  o : breathing unlabored  o Inspection of Chest  o : normal appearance  o Auscultation of Lungs  o : normal breath sounds throughout inspiration and expiration  · Cardiovascular  o Heart  o :   § Auscultation of Heart  § : regular rate and rhythm, no murmurs, gallops or rubs  o Peripheral Vascular System  o :   § Carotid Arteries  § : normal pulses bilaterally, no bruits present  · Gastrointestinal  o Abdominal Examination  o : abdomen nontender to palpation, tone normal without rigidity or guarding, no masses present, bowel sounds present  · Skin and Subcutaneous Tissue  o General Inspection  o : no rashes or lesions present, no areas of discoloration  o Body Hair  o : hair normal for age, general body hair distribution normal for age  o Digits and Nails  o : no clubbing, cyanosis, deformities or edema present, normal appearing nails  · Neurologic  o Mental Status Examination  o :   § Orientation  § : grossly oriented to person, place and time  o Gait and Station  o : normal gait, able to stand without difficulty  · Psychiatric  o Judgement and Insight  o : judgment and insight intact  o Mood and Affect  o : mood normal, affect appropriate          Assessment  · Anxiety disorder     300.00/F41.9  · Depression     311/F32.9  · Chronic pain     338.29/G89.29  · Twitching     781.0/R25.3  declines neurology referral.  · Epistaxis, recurrent     784.7/R04.0  take b/p med. use saline nasal gel. go to ER if not stopping otherwise can f/u in office for cautery. controlled at present.      Plan  · Orders  o Physical, Primary Care Panel (CBC, CMP, Lipid, TSH) [facility] (98738, 26102, 46575, 14753) - 300.00/F41.9, 311/F32.9, 338.29/G89.29 - 02/24/2021  o ACO-14: Influenza immunization was not administered for reasons documented [facility] () - - 02/24/2021  o ACO-39: Current medications updated and reviewed (, 4248G) - - 02/24/2021  · Medications  o hydrochlorothiazide 12.5 mg oral  tablet   SIG: take 1 tablet by oral route once a day (in the morning) for 30 days   DISP: (30) Tablet with 1 refills  Prescribed on 02/24/2021     o buspirone 5 mg oral tablet   SIG: take 1 tablet (5 mg) by oral route 3 times per day for 30 days   DISP: (90) Tablet with 1 refills  Prescribed on 02/24/2021     o duloxetine 60 mg oral capsule,delayed release(DR/EC)   SIG: take 2 capsules (120 mg) by oral route once daily for 90 days   DISP: (180) Capsule with 1 refills  Refilled on 02/24/2021     o hydroxyzine HCl 25 mg oral tablet   SIG: TAKE 1 TABLET BY MOUTH AT BEDTIME AS NEEDED   DISP: (90) Tablet with 1 refills  Refilled on 02/24/2021     o Wellbutrin  mg oral tablet extended release 24 hr   SIG: take 1 tablet (150 mg) by oral route once daily for 90 days   DISP: (90) Tablet with 1 refills  Refilled on 02/24/2021     o Medications have been Reconciled  o Transition of Care or Provider Policy  · Instructions  o Patient was given an SSRI/SSNRI medication and warned of possible side effects of the medication including potential for increased risk of suicidal thoughts and feelings. Patient was instructed that if they begin to exhibit any of these effects they will discontinue the medication immediately and contact our office or the ER ASAP.  o Patient was educated/instructed on their diagnosis, treatment and medications prior to discharge from the clinic today.  o Call the office with any concerns or questions.  · Disposition  o Return to Office in 1 month.            Electronically Signed by: MICHA Muñiz -Author on March 3, 2021 01:28:58 PM

## 2021-05-14 NOTE — PROGRESS NOTES
Progress Note      Patient Name: Laurel Molina   Patient ID: [id number]   Sex: Female   YOB: 1985    Primary Care Provider: Rula MUSE   Referring Provider: Babita Adhikari MD    Visit Date: March 24, 2021    Provider: MICHA Muñiz   Location: [facility]   Location Address: [address] [place] [address]  [id number]   Location Phone: [phone]          Chief Complaint  · 1 Month Follow up for Anxiety, Depression, and Chronic Pain      History Of Present Illness  Laurel Molina is a 35 year old /Black female who presents for evaluation and treatment of:      1 Month Follow up for Anxiety, Depression, and Chronic Pain    Pt reported doing well. Anxiety is improved. tolerating Buspar well.     Pt was at the ER on 3/16/21 for TIA.  Pt is off work until her appt with Neurology/ Dr. Corona.       Past Medical History  Disease Name Date Onset Notes   Acid reflux --  --    Allergic rhinitis --  --    Anxiety and depression --  --    Arthritis --  --    Leg pain --  --    Seasonal allergies --  --    Sinus trouble --  --    Sleep disturbance --  --    Thumb fracture 2012 Right          Past Surgical History  Procedure Name Date Notes   Joint Surgery --  --    Knee reconstruction, open, ligament 2014 Left          Medication List  Name Date Started Instructions   aspirin 81 mg oral tablet,delayed release (DR/EC)  take 1 tablet (81 mg) by oral route once daily   atorvastatin 10 mg oral tablet 03/17/2021 take 1 tablet (10 mg) by oral route once daily at bedtime for 30 days   buspirone 5 mg oral tablet 02/24/2021 take 1 tablet (5 mg) by oral route 3 times per day for 30 days   clindamycin-benzoyl peroxide 1-5 % topical gel with pump 04/24/2019 apply to the affected area(s) by topical route 2 times per day in the morning and evening for 30 days   duloxetine 60 mg oral capsule,delayed release(DR/EC) 02/24/2021 take 2 capsules (120 mg) by oral  route once daily for 90 days   hydrochlorothiazide 12.5 mg oral tablet 03/19/2021 take 1 tablet by oral route once a day (in the morning) for 30 days   hydroxyzine HCl 25 mg oral tablet 02/24/2021 TAKE 1 TABLET BY MOUTH AT BEDTIME AS NEEDED   ibuprofen 800 mg oral tablet 09/23/2020 TAKE 1 TABLET BY MOUTH TWICE A DAY PRN   lisinopril 5 mg oral tablet 03/17/2021 take 1 tablet (5 mg) by oral route once daily for 30 days   Wellbutrin  mg oral tablet extended release 24 hr 02/24/2021 take 1 tablet (150 mg) by oral route once daily for 90 days   Zyrtec 10 mg oral tablet 03/21/2018 take 1 tablet by oral route once a day (at bedtime) for 90 days         Allergy List  Allergen Name Date Reaction Notes   naproxen --  --  --    NSAIDS --  --  --          Family Medical History  Disease Name Relative/Age Notes   Family history of Arthritis Brother/  Father/  Mother/   Mother; Father; Brother  Mother; Father   Family history of heart disease Brother/  Father/   Father; Brother   Family history of diabetes mellitus Brother/   Brother         Social History  Finding Status Start/Stop Quantity Notes   Alcohol Never --/-- --  03/11/2020 - does not drink   Alcohol Use Never --/-- --  does not drink   lives with parents --  --/-- --  --    Recreational Drug Use Never --/-- --  no   Single --  --/-- --  --    Tobacco Never --/-- --  never smoker   Working --  --/-- --  --          Review of Systems  · Constitutional  o Admits  o : weight gain  o Denies  o : fatigue, fever, weight loss, chills  · Cardiovascular  o Denies  o : chest Pain, palpitations, edema (swelling)  · Respiratory  o Denies  o : frequent cough, shortness of breath  · Gastrointestinal  o Denies  o : nausea, vomiting, changes in bowel habits  · Genitourinary  o Denies  o : dysuria, urinary frequency, urinary urgency, polyuria  · Neurologic  o Denies  o : headache, tingling or numbness, dizziness  · Musculoskeletal  o Admits  o : joint pain  o Denies  o :  "myalgias  · Psychiatric  o Admits  o : anxiety, depression  o Denies  o : mood changes, memory changes, SI/HI      Vitals  Date Time BP Position Site L\R Cuff Size HR RR TEMP (F) WT  HT  BMI kg/m2 BSA m2 O2 Sat FR L/min FiO2 HC       09/23/2020 08:30 /83 Sitting    92 - R 12 98.4 128lbs 2oz 5'  3.5\" 22.34 1.61 99 %      02/24/2021 08:28 /90 Sitting    108 - R 12 98.9 129lbs 8oz 5'  3.5\" 22.58 1.62 100 %      03/24/2021 09:18 /71 Sitting    100 - R 12 98.4 130lbs 2oz 5'  3.5\" 22.69 1.63 100 %            Physical Examination  · Constitutional  o Appearance  o : well-nourished, in no acute distress  · Eyes  o Conjunctivae  o : conjunctivae normal  o Sclerae  o : sclerae white  o Pupils and Irises  o : pupils equal and round  o Eyelids/Ocular Adnexae  o : eyelid appearance normal, no exudates present  · Respiratory  o Respiratory Effort  o : breathing unlabored  o Inspection of Chest  o : normal appearance  o Auscultation of Lungs  o : normal breath sounds throughout inspiration and expiration  · Cardiovascular  o Heart  o :   § Auscultation of Heart  § : regular rate and rhythm, no murmurs, gallops or rubs  o Peripheral Vascular System  o :   § Carotid Arteries  § : normal pulses bilaterally, no bruits present  · Gastrointestinal  o Abdominal Examination  o : abdomen nontender to palpation, tone normal without rigidity or guarding, no masses present, bowel sounds present  · Skin and Subcutaneous Tissue  o General Inspection  o : no rashes or lesions present, no areas of discoloration  o Body Hair  o : hair normal for age, general body hair distribution normal for age  o Digits and Nails  o : no clubbing, cyanosis, deformities or edema present, normal appearing nails  · Neurologic  o Mental Status Examination  o :   § Orientation  § : grossly oriented to person, place and time  o Gait and Station  o : normal gait, able to stand without difficulty  · Psychiatric  o Judgement and Insight  o : judgment " and insight intact  o Mood and Affect  o : mood normal, affect appropriate          Assessment  · Anxiety disorder     300.00/F41.9  · Depression     311/F32.9  · Chronic pain     338.29/G89.29      Plan  · Orders  o ACO-39: Current medications updated and reviewed (, 1159F) - - 03/24/2021  · Medications  o buspirone 5 mg oral tablet   SIG: take 1 tablet (5 mg) by oral route 3 times per day for 30 days   DISP: (90) Tablet with 1 refills  Refilled on 03/24/2021     · Instructions  o Patient instructed/educated on their diet and exercise program.  o Patient was educated/instructed on their diagnosis, treatment and medications prior to discharge from the clinic today.  o Call the office with any concerns or questions.  · Disposition  o Return Visit Request in/on 5 months +/- 2 days ([id number]).            Electronically Signed by: MICHA Muñiz -Author on March 24, 2021 09:31:59 AM

## 2021-05-15 VITALS
SYSTOLIC BLOOD PRESSURE: 126 MMHG | BODY MASS INDEX: 22.86 KG/M2 | HEIGHT: 63 IN | WEIGHT: 129 LBS | HEART RATE: 88 BPM | OXYGEN SATURATION: 99 % | RESPIRATION RATE: 12 BRPM | DIASTOLIC BLOOD PRESSURE: 79 MMHG

## 2021-05-15 VITALS
OXYGEN SATURATION: 100 % | DIASTOLIC BLOOD PRESSURE: 82 MMHG | TEMPERATURE: 98.1 F | WEIGHT: 122 LBS | HEART RATE: 86 BPM | BODY MASS INDEX: 21.62 KG/M2 | HEIGHT: 63 IN | SYSTOLIC BLOOD PRESSURE: 121 MMHG | RESPIRATION RATE: 38 BRPM

## 2021-05-15 VITALS — HEART RATE: 82 BPM | WEIGHT: 129.12 LBS | HEIGHT: 63 IN | OXYGEN SATURATION: 98 % | BODY MASS INDEX: 22.88 KG/M2

## 2021-05-15 VITALS
SYSTOLIC BLOOD PRESSURE: 114 MMHG | HEIGHT: 63 IN | OXYGEN SATURATION: 100 % | WEIGHT: 124 LBS | DIASTOLIC BLOOD PRESSURE: 81 MMHG | BODY MASS INDEX: 21.97 KG/M2 | HEART RATE: 85 BPM | RESPIRATION RATE: 12 BRPM | TEMPERATURE: 98.3 F

## 2021-05-15 VITALS
DIASTOLIC BLOOD PRESSURE: 80 MMHG | HEART RATE: 107 BPM | OXYGEN SATURATION: 94 % | HEIGHT: 63 IN | BODY MASS INDEX: 23.08 KG/M2 | RESPIRATION RATE: 12 BRPM | SYSTOLIC BLOOD PRESSURE: 135 MMHG | WEIGHT: 130.25 LBS

## 2021-05-15 VITALS
OXYGEN SATURATION: 98 % | SYSTOLIC BLOOD PRESSURE: 148 MMHG | BODY MASS INDEX: 21.26 KG/M2 | RESPIRATION RATE: 30 BRPM | TEMPERATURE: 98.1 F | HEART RATE: 95 BPM | HEIGHT: 63 IN | WEIGHT: 120 LBS | DIASTOLIC BLOOD PRESSURE: 80 MMHG

## 2021-05-15 VITALS — OXYGEN SATURATION: 98 % | HEIGHT: 63 IN | HEART RATE: 104 BPM | WEIGHT: 123 LBS | BODY MASS INDEX: 21.79 KG/M2

## 2021-05-15 VITALS
WEIGHT: 129 LBS | SYSTOLIC BLOOD PRESSURE: 134 MMHG | OXYGEN SATURATION: 100 % | TEMPERATURE: 98.3 F | BODY MASS INDEX: 22.86 KG/M2 | RESPIRATION RATE: 12 BRPM | DIASTOLIC BLOOD PRESSURE: 86 MMHG | HEART RATE: 112 BPM | HEIGHT: 63 IN

## 2021-05-15 VITALS
SYSTOLIC BLOOD PRESSURE: 137 MMHG | RESPIRATION RATE: 12 BRPM | OXYGEN SATURATION: 100 % | HEART RATE: 97 BPM | DIASTOLIC BLOOD PRESSURE: 85 MMHG | BODY MASS INDEX: 22.5 KG/M2 | WEIGHT: 127 LBS | HEIGHT: 63 IN

## 2021-05-15 VITALS — HEIGHT: 63 IN | OXYGEN SATURATION: 93 % | HEART RATE: 84 BPM | WEIGHT: 120 LBS | BODY MASS INDEX: 21.26 KG/M2

## 2021-05-15 VITALS
HEART RATE: 91 BPM | HEIGHT: 63 IN | SYSTOLIC BLOOD PRESSURE: 119 MMHG | WEIGHT: 127 LBS | DIASTOLIC BLOOD PRESSURE: 86 MMHG | BODY MASS INDEX: 22.5 KG/M2

## 2021-05-15 VITALS — HEIGHT: 63 IN | WEIGHT: 125 LBS | BODY MASS INDEX: 22.15 KG/M2 | HEART RATE: 105 BPM | OXYGEN SATURATION: 99 %

## 2021-05-15 VITALS
RESPIRATION RATE: 28 BRPM | WEIGHT: 121 LBS | SYSTOLIC BLOOD PRESSURE: 117 MMHG | DIASTOLIC BLOOD PRESSURE: 78 MMHG | TEMPERATURE: 98.1 F | HEIGHT: 63 IN | BODY MASS INDEX: 21.44 KG/M2 | OXYGEN SATURATION: 98 % | HEART RATE: 87 BPM

## 2021-05-15 VITALS — OXYGEN SATURATION: 93 % | WEIGHT: 125.12 LBS | HEIGHT: 63 IN | HEART RATE: 61 BPM | BODY MASS INDEX: 22.17 KG/M2

## 2021-05-15 VITALS — OXYGEN SATURATION: 98 % | BODY MASS INDEX: 22.17 KG/M2 | HEIGHT: 63 IN | WEIGHT: 125.12 LBS | HEART RATE: 80 BPM

## 2021-05-16 VITALS
DIASTOLIC BLOOD PRESSURE: 76 MMHG | WEIGHT: 119 LBS | RESPIRATION RATE: 21 BRPM | HEART RATE: 78 BPM | OXYGEN SATURATION: 98 % | SYSTOLIC BLOOD PRESSURE: 119 MMHG | BODY MASS INDEX: 21.09 KG/M2 | TEMPERATURE: 97.7 F | HEIGHT: 63 IN

## 2021-05-16 VITALS
BODY MASS INDEX: 20.38 KG/M2 | TEMPERATURE: 97.7 F | SYSTOLIC BLOOD PRESSURE: 127 MMHG | DIASTOLIC BLOOD PRESSURE: 82 MMHG | OXYGEN SATURATION: 100 % | HEART RATE: 96 BPM | HEIGHT: 63 IN | WEIGHT: 115 LBS | RESPIRATION RATE: 21 BRPM

## 2021-06-08 RX ORDER — LISINOPRIL 5 MG/1
5 TABLET ORAL DAILY
COMMUNITY
Start: 2021-04-09 | End: 2021-06-08 | Stop reason: SDUPTHER

## 2021-06-08 RX ORDER — LISINOPRIL 5 MG/1
TABLET ORAL
Qty: 90 TABLET | Refills: 1 | Status: SHIPPED | OUTPATIENT
Start: 2021-06-08 | End: 2021-08-23

## 2021-08-02 PROCEDURE — U0003 INFECTIOUS AGENT DETECTION BY NUCLEIC ACID (DNA OR RNA); SEVERE ACUTE RESPIRATORY SYNDROME CORONAVIRUS 2 (SARS-COV-2) (CORONAVIRUS DISEASE [COVID-19]), AMPLIFIED PROBE TECHNIQUE, MAKING USE OF HIGH THROUGHPUT TECHNOLOGIES AS DESCRIBED BY CMS-2020-01-R: HCPCS | Performed by: EMERGENCY MEDICINE

## 2021-08-20 RX ORDER — CETIRIZINE HYDROCHLORIDE 10 MG/1
10 TABLET ORAL DAILY
COMMUNITY
End: 2021-08-23

## 2021-08-20 RX ORDER — ASPIRIN 81 MG/1
81 TABLET ORAL DAILY
COMMUNITY
End: 2022-08-24

## 2021-08-20 RX ORDER — HYDROXYZINE HYDROCHLORIDE 25 MG/1
25 TABLET, FILM COATED ORAL NIGHTLY PRN
COMMUNITY
End: 2021-08-23 | Stop reason: SDUPTHER

## 2021-08-20 RX ORDER — HYDROCHLOROTHIAZIDE 12.5 MG/1
12.5 TABLET ORAL DAILY
COMMUNITY
End: 2021-08-23

## 2021-08-20 RX ORDER — IBUPROFEN 800 MG/1
800 TABLET ORAL 2 TIMES DAILY PRN
COMMUNITY

## 2021-08-20 RX ORDER — BUPROPION HYDROCHLORIDE 150 MG/1
150 TABLET, EXTENDED RELEASE ORAL DAILY
COMMUNITY
End: 2021-08-23

## 2021-08-20 RX ORDER — ATORVASTATIN CALCIUM 10 MG/1
10 TABLET, FILM COATED ORAL DAILY
COMMUNITY
End: 2021-08-23 | Stop reason: SDUPTHER

## 2021-08-20 RX ORDER — DULOXETIN HYDROCHLORIDE 60 MG/1
60 CAPSULE, DELAYED RELEASE ORAL 2 TIMES DAILY
COMMUNITY
End: 2021-08-23 | Stop reason: SDUPTHER

## 2021-08-20 RX ORDER — CLINDAMYCIN PHOSPHATE AND BENZOYL PEROXIDE 10; 50 MG/G; MG/G
GEL TOPICAL 2 TIMES DAILY
COMMUNITY
End: 2022-02-24

## 2021-08-23 ENCOUNTER — OFFICE VISIT (OUTPATIENT)
Dept: FAMILY MEDICINE CLINIC | Facility: CLINIC | Age: 36
End: 2021-08-23

## 2021-08-23 VITALS
TEMPERATURE: 98.7 F | DIASTOLIC BLOOD PRESSURE: 85 MMHG | HEART RATE: 90 BPM | HEIGHT: 64 IN | SYSTOLIC BLOOD PRESSURE: 124 MMHG | OXYGEN SATURATION: 100 % | BODY MASS INDEX: 22.2 KG/M2 | RESPIRATION RATE: 12 BRPM | WEIGHT: 130 LBS

## 2021-08-23 DIAGNOSIS — E04.1 THYROID NODULE: ICD-10-CM

## 2021-08-23 DIAGNOSIS — Z86.73 HISTORY OF STROKE: ICD-10-CM

## 2021-08-23 DIAGNOSIS — F41.9 ANXIETY: Primary | ICD-10-CM

## 2021-08-23 DIAGNOSIS — N92.6 IRREGULAR MENSES: ICD-10-CM

## 2021-08-23 PROBLEM — I63.9 STROKE: Status: ACTIVE | Noted: 2021-03-16

## 2021-08-23 PROCEDURE — 99214 OFFICE O/P EST MOD 30 MIN: CPT | Performed by: NURSE PRACTITIONER

## 2021-08-23 RX ORDER — ATORVASTATIN CALCIUM 10 MG/1
10 TABLET, FILM COATED ORAL DAILY
Qty: 90 TABLET | Refills: 1 | Status: SHIPPED | OUTPATIENT
Start: 2021-08-23 | End: 2022-02-24 | Stop reason: SDUPTHER

## 2021-08-23 RX ORDER — HYDROXYZINE HYDROCHLORIDE 25 MG/1
25 TABLET, FILM COATED ORAL
Qty: 90 TABLET | Refills: 1 | Status: SHIPPED | OUTPATIENT
Start: 2021-08-23 | End: 2022-02-24 | Stop reason: SDUPTHER

## 2021-08-23 RX ORDER — DULOXETIN HYDROCHLORIDE 60 MG/1
60 CAPSULE, DELAYED RELEASE ORAL DAILY
Qty: 90 CAPSULE | Refills: 1 | Status: SHIPPED | OUTPATIENT
Start: 2021-08-23 | End: 2021-12-22

## 2021-08-23 NOTE — ASSESSMENT & PLAN NOTE
Continue Cymbalta.     Patient was given an SSRI/SNRI medication and warned of possible side effects of the medication including potential for increased risk of suicidal thoughts and feelings.  Patient was instructed that if they begin to exhibit any of these effects that they will discontinue the medication immediately and contact our office or the ER ASAP.

## 2021-08-23 NOTE — PATIENT INSTRUCTIONS
"[email or web address]   Generalized Anxiety Disorder, Adult  Generalized anxiety disorder (VIOLETTE) is a mental health condition. Unlike normal worries, anxiety related to VIOLETTE is not triggered by a specific event. These worries do not fade or get better with time. VIOLETTE interferes with relationships, work, and school.  VIOLETTE symptoms can vary from mild to severe. People with severe VIOLETTE can have intense waves of anxiety with physical symptoms that are similar to panic attacks.  What are the causes?  The exact cause of VIOLETTE is not known, but the following are believed to have an impact:  · Differences in natural brain chemicals.  · Genes passed down from parents to children.  · Differences in the way threats are perceived.  · Development during childhood.  · Personality.  What increases the risk?  The following factors may make you more likely to develop this condition:  · Being female.  · Having a family history of anxiety disorders.  · Being very shy.  · Experiencing very stressful life events, such as the death of a loved one.  · Having a very stressful family environment.  What are the signs or symptoms?  People with VIOLETTE often worry excessively about many things in their lives, such as their health and family. Symptoms may also include:  · Mental and emotional symptoms:  ? Worrying excessively about natural disasters.  ? Fear of being late.  ? Difficulty concentrating.  ? Fears that others are judging your performance.  · Physical symptoms:  ? Fatigue.  ? Headaches, muscle tension, muscle twitches, trembling, or feeling shaky.  ? Feeling like your heart is pounding or beating very fast.  ? Feeling out of breath or like you cannot take a deep breath.  ? Having trouble falling asleep or staying asleep, or experiencing restlessness.  ? Sweating.  ? Nausea, diarrhea, or irritable bowel syndrome (IBS).  · Behavioral symptoms:  ? Experiencing erratic moods or irritability.  ? Avoidance of new situations.  ? Avoidance of " people.  ? Extreme difficulty making decisions.  How is this diagnosed?  This condition is diagnosed based on your symptoms and medical history. You will also have a physical exam. Your health care provider may perform tests to rule out other possible causes of your symptoms.  To be diagnosed with VIOLETTE, a person must have anxiety that:  · Is out of his or her control.  · Affects several different aspects of his or her life, such as work and relationships.  · Causes distress that makes him or her unable to take part in normal activities.  · Includes at least three symptoms of VIOLETTE, such as restlessness, fatigue, trouble concentrating, irritability, muscle tension, or sleep problems.  Before your health care provider can confirm a diagnosis of VIOLETTE, these symptoms must be present more days than they are not, and they must last for 6 months or longer.  How is this treated?  This condition may be treated with:  · Medicine. Antidepressant medicine is usually prescribed for long-term daily control. Anti-anxiety medicines may be added in severe cases, especially when panic attacks occur.  · Talk therapy (psychotherapy). Certain types of talk therapy can be helpful in treating VIOLETTE by providing support, education, and guidance. Options include:  ? Cognitive behavioral therapy (CBT). People learn coping skills and self-calming techniques to ease their physical symptoms. They learn to identify unrealistic thoughts and behaviors and to replace them with more appropriate thoughts and behaviors.  ? Acceptance and commitment therapy (ACT). This treatment teaches people how to be mindful as a way to cope with unwanted thoughts and feelings.  ? Biofeedback. This process trains you to manage your body's response (physiological response) through breathing techniques and relaxation methods. You will work with a therapist while machines are used to monitor your physical symptoms.  · Stress management techniques. These include yoga,  meditation, and exercise.  A mental health specialist can help determine which treatment is best for you. Some people see improvement with one type of therapy. However, other people require a combination of therapies.  Follow these instructions at home:  Lifestyle  · Maintain a consistent routine and schedule.  · Anticipate stressful situations. Create a plan, and allow extra time to work with your plan.  · Practice stress management or self-calming techniques that you have learned from your therapist or your health care provider.  General instructions  · Take over-the-counter and prescription medicines only as told by your health care provider.  · Understand that you are likely to have setbacks. Accept this and be kind to yourself as you persist to take better care of yourself.  · Recognize and accept your accomplishments, even if you  them as small.  · Keep all follow-up visits as told by your health care provider. This is important.  Contact a health care provider if:  · Your symptoms do not get better.  · Your symptoms get worse.  · You have signs of depression, such as:  ? A persistently sad or irritable mood.  ? Loss of enjoyment in activities that used to bring you solomon.  ? Change in weight or eating.  ? Changes in sleeping habits.  ? Avoiding friends or family members.  ? Loss of energy for normal tasks.  ? Feelings of guilt or worthlessness.  Get help right away if:  · You have serious thoughts about hurting yourself or others.  If you ever feel like you may hurt yourself or others, or have thoughts about taking your own life, get help right away. Go to your nearest emergency department or:  · Call your local emergency services (301 in the [place]  · Call a suicide crisis helpline, such as the National Suicide Prevention Lifeline at 1-735.258.7943. This is open 24 hours a day in the [place]  · Text the Crisis Text Line at 467353 (in the [place]  Summary  · Generalized anxiety disorder (VIOLETTE) is a mental  health condition that involves worry that is not triggered by a specific event.  · People with VIOLETTE often worry excessively about many things in their lives, such as their health and family.  · VIOLETTE may cause symptoms such as restlessness, trouble concentrating, sleep problems, frequent sweating, nausea, diarrhea, headaches, and trembling or muscle twitching.  · A mental health specialist can help determine which treatment is best for you. Some people see improvement with one type of therapy. However, other people require a combination of therapies.  This information is not intended to replace advice given to you by your health care provider. Make sure you discuss any questions you have with your health care provider.  Document Revised: 10/07/2020 Document Reviewed: 10/07/2020  Elsevier Patient Education © 2021 [employer]

## 2021-08-23 NOTE — PROGRESS NOTES
Answers for HPI/ROS submitted by the patient on 8/21/2021  Please describe your symptoms.: Follow up for anxiety/depression, blood pressure, cholesterol, , Period ended and started back two days later  Have you had these symptoms before?: No  How long have you been having these symptoms?: 5-7 days  What is the primary reason for your visit?: Other    Chief Complaint  Follow-up (5 Month), Anxiety, Depression, Hyperlipidemia, and Hypertension    Subjective          Laurel Molina is a 36 y.o. female who presents to [facility]     History of Present Illness    Anxiety/depression - improved with medication and recent job change. Pt is able to sleep better with hydroxazine.     HTN - well controlled. Pt reports b/p wnl at home. Pt had lapse in insurance and has not been taking lisinopril. History of TIA, recommend continued lipitor.    12 mm thyroid nodule noted on MRI cervical spine. Thyroid u/s recommended for f/u.     PHQ-2 Total Score: 0   PHQ-9 Total Score: 0       Review of Systems   Constitutional: Negative for chills, fatigue and fever.   Respiratory: Negative for cough and shortness of breath.    Cardiovascular: Negative for chest pain and palpitations.   Gastrointestinal: Negative for constipation, diarrhea, nausea and vomiting.   Musculoskeletal: Negative for back pain and neck pain.   Skin: Negative for rash.   Neurological: Negative for dizziness and headaches.   Psychiatric/Behavioral: Positive for sleep disturbance. The patient is nervous/anxious.           Medical History: has a past medical history of Acid reflux, Allergic rhinitis, Anxiety, Arthritis, Depression, Hyperlipidemia, Hypertension, Leg pain, Seasonal allergies, Sinus trouble, Sleep disturbance, Stroke ([facility]) (3/16/2021), and Thumb fracture (2012).     Surgical History: has a past surgical history that includes Ankle surgery and Posterior cruciate ligament repair (Left, 2014).     Family History: family  "history includes Arthritis in her brother, father, and mother; COPD in her father; Diabetes in her brother, father, and mother; Heart disease in her brother and father; Hyperlipidemia in her father; Other in her father.     Social History: reports that she has never smoked. She has never used smokeless tobacco. She reports that she does not drink alcohol and does not use drugs.    Allergies: Naproxen      Health Maintenance Due   Topic Date Due   • ANNUAL PHYSICAL  Never done   • TDAP/TD VACCINES (2 - Tdap) 06/12/2008   • HEPATITIS C SCREENING  Never done   • PAP SMEAR  06/08/2021            Current Outpatient Medications:   •  aspirin 81 MG EC tablet, Take 81 mg by mouth Daily., Disp: , Rfl:   •  atorvastatin (LIPITOR) 10 MG tablet, Take 1 tablet by mouth Daily., Disp: 90 tablet, Rfl: 1  •  DULoxetine (CYMBALTA) 60 MG capsule, Take 1 capsule by mouth Daily., Disp: 90 capsule, Rfl: 1  •  hydrOXYzine (ATARAX) 25 MG tablet, Take 1 tablet by mouth every night at bedtime., Disp: 90 tablet, Rfl: 1  •  ibuprofen (ADVIL,MOTRIN) 800 MG tablet, Take 800 mg by mouth 2 (Two) Times a Day As Needed., Disp: , Rfl:   •  Clindamycin Phos-Benzoyl Perox 1.2-5 % gel, Apply  topically to the appropriate area as directed 2 (Two) Times a Day., Disp: , Rfl:       Immunization History   Administered Date(s) Administered   • COVID-19 (PFIZER) 06/25/2021, 07/16/2021   • Td 06/12/1998         Objective       Vitals:    08/23/21 1133   BP: 124/85   Pulse: 90   Resp: 12   Temp: 98.7 °F (37.1 °C)   TempSrc: Temporal   SpO2: 100%   Weight: 59 kg (130 lb)   Height: 161.3 cm (63.5\")   PainSc: 0-No pain      Body mass index is 22.67 kg/m².   Wt Readings from Last 3 Encounters:   08/23/21 59 kg (130 lb)   08/02/21 56.7 kg (125 lb)   03/24/21 59 kg (130 lb 2 oz)      BP Readings from Last 3 Encounters:   08/23/21 124/85   08/02/21 124/78   03/24/21 106/71        Physical Exam  Vitals reviewed.   Constitutional:       Appearance: Normal appearance. She " is well-developed.   HENT:      Head: Normocephalic and atraumatic.   Eyes:      Conjunctiva/sclera: Conjunctivae normal.      Pupils: Pupils are equal, round, and reactive to light.   Neck:      Thyroid: No thyroid mass, thyromegaly or thyroid tenderness.   Cardiovascular:      Rate and Rhythm: Normal rate and regular rhythm.      Heart sounds: Normal heart sounds. No murmur heard.     Pulmonary:      Effort: Pulmonary effort is normal.      Breath sounds: Normal breath sounds. No wheezing or rhonchi.   Abdominal:      General: Bowel sounds are normal. There is no distension.      Palpations: Abdomen is soft.      Tenderness: There is no abdominal tenderness.   Skin:     General: Skin is warm and dry.   Neurological:      Mental Status: She is alert and oriented to person, place, and time.   Psychiatric:         Mood and Affect: Mood and affect normal.         Behavior: Behavior normal.         Thought Content: Thought content normal.         Judgment: Judgment normal.             Result Review :     CMP    CMP 2/24/21 3/16/21   Glucose 78 77   BUN 13 9   Creatinine 0.91 (A) 0.98 (A)   Sodium 140 138   Potassium 4.2 3.7   Chloride 105 104   Calcium 9.5 9.2   Albumin 4.4 4.5   Total Bilirubin 0.35 0.41   Alkaline Phosphatase 49 53   AST (SGOT) 23 22   ALT (SGPT) 11 12   (A) Abnormal value            Lipid Panel    Lipid Panel 2/24/21   Total Cholesterol 190   Triglycerides 35 (A)   HDL Cholesterol 98 (A)   VLDL Cholesterol 7   LDL Cholesterol  85   (A) Abnormal value       Comments are available for some flowsheets but are not being displayed.                    Assessment and Plan        Diagnoses and all orders for this visit:    1. Anxiety (Primary)  Assessment & Plan:  Continue Cymbalta.     Patient was given an SSRI/SNRI medication and warned of possible side effects of the medication including potential for increased risk of suicidal thoughts and feelings.  Patient was instructed that if they begin to exhibit  any of these effects that they will discontinue the medication immediately and contact our office or the ER ASAP.      Orders:  -     hydrOXYzine (ATARAX) 25 MG tablet; Take 1 tablet by mouth every night at bedtime.  Dispense: 90 tablet; Refill: 1  -     DULoxetine (CYMBALTA) 60 MG capsule; Take 1 capsule by mouth Daily.  Dispense: 90 capsule; Refill: 1    2. History of stroke  -     atorvastatin (LIPITOR) 10 MG tablet; Take 1 tablet by mouth Daily.  Dispense: 90 tablet; Refill: 1    3. Irregular menses  -     TSH+Free T4    4. Thyroid nodule  -     US Thyroid; Future        Follow Up     Return in about 6 months (around 2/23/2022) for Next scheduled follow up.     The patient is asked to make an attempt to improve diet and exercise patterns to aid in medical management of this problem.      Patient was given instructions and counseling regarding her condition or for health maintenance advice. Please see specific information pulled into the AVS if appropriate.     MICHA Muñiz

## 2021-09-03 ENCOUNTER — HOSPITAL ENCOUNTER (OUTPATIENT)
Dept: ULTRASOUND IMAGING | Facility: HOSPITAL | Age: 36
Discharge: HOME | End: 2021-09-03
Admitting: NURSE PRACTITIONER

## 2021-09-03 DIAGNOSIS — E04.1 THYROID NODULE: ICD-10-CM

## 2021-09-03 PROCEDURE — 76536 US EXAM OF HEAD AND NECK: CPT

## 2021-09-10 ENCOUNTER — TELEPHONE (OUTPATIENT)
Dept: FAMILY MEDICINE CLINIC | Facility: CLINIC | Age: 36
End: 2021-09-10

## 2021-09-14 ENCOUNTER — TELEPHONE (OUTPATIENT)
Dept: FAMILY MEDICINE CLINIC | Facility: CLINIC | Age: 36
End: 2021-09-14

## 2021-09-16 ENCOUNTER — TRANSCRIBE ORDERS (OUTPATIENT)
Dept: ADMINISTRATIVE | Facility: HOSPITAL | Age: 36
End: 2021-09-16

## 2021-09-20 ENCOUNTER — TELEPHONE (OUTPATIENT)
Dept: FAMILY MEDICINE CLINIC | Facility: CLINIC | Age: 36
End: 2021-09-20

## 2021-09-20 NOTE — TELEPHONE ENCOUNTER
Unable to place correct code for biopsy per scheduling. Working with [facility] team to resolve this issue.

## 2021-09-22 ENCOUNTER — TELEPHONE (OUTPATIENT)
Dept: FAMILY MEDICINE CLINIC | Facility: CLINIC | Age: 36
End: 2021-09-22

## 2021-09-22 DIAGNOSIS — E04.1 THYROID NODULE: Primary | ICD-10-CM

## 2021-10-15 ENCOUNTER — HOSPITAL ENCOUNTER (OUTPATIENT)
Dept: ULTRASOUND IMAGING | Facility: HOSPITAL | Age: 36
Discharge: HOME | End: 2021-10-15
Admitting: NURSE PRACTITIONER

## 2021-10-15 DIAGNOSIS — E04.1 THYROID NODULE: ICD-10-CM

## 2021-10-15 PROCEDURE — 76942 ECHO GUIDE FOR BIOPSY: CPT

## 2021-10-15 PROCEDURE — 25010000003 LIDOCAINE 1 % SOLUTION: Performed by: NURSE PRACTITIONER

## 2021-10-15 PROCEDURE — 88173 CYTOPATH EVAL FNA REPORT: CPT | Performed by: NURSE PRACTITIONER

## 2021-10-15 RX ORDER — LIDOCAINE HYDROCHLORIDE 10 MG/ML
10 INJECTION, SOLUTION INFILTRATION; PERINEURAL ONCE
Status: COMPLETED | OUTPATIENT
Start: 2021-10-15 | End: 2021-10-15

## 2021-10-15 RX ADMIN — LIDOCAINE HYDROCHLORIDE 10 ML: 10 INJECTION, SOLUTION INFILTRATION; PERINEURAL at 13:30

## 2021-10-19 LAB
CYTO UR: NORMAL
LAB AP CASE REPORT: NORMAL
LAB AP CLINICAL INFORMATION: NORMAL
LAB AP NON-GYN SPECIMEN ADEQUACY: NORMAL
PATH REPORT.FINAL DX SPEC: NORMAL
PATH REPORT.GROSS SPEC: NORMAL

## 2021-10-21 ENCOUNTER — TELEPHONE (OUTPATIENT)
Dept: FAMILY MEDICINE CLINIC | Facility: CLINIC | Age: 36
End: 2021-10-21

## 2021-12-21 DIAGNOSIS — F41.9 ANXIETY: ICD-10-CM

## 2021-12-22 RX ORDER — DULOXETIN HYDROCHLORIDE 60 MG/1
CAPSULE, DELAYED RELEASE ORAL
Qty: 180 CAPSULE | Refills: 0 | Status: SHIPPED | OUTPATIENT
Start: 2021-12-22 | End: 2022-02-24 | Stop reason: SDUPTHER

## 2022-02-24 ENCOUNTER — OFFICE VISIT (OUTPATIENT)
Dept: FAMILY MEDICINE CLINIC | Facility: CLINIC | Age: 37
End: 2022-02-24

## 2022-02-24 VITALS
HEIGHT: 64 IN | OXYGEN SATURATION: 99 % | HEART RATE: 110 BPM | WEIGHT: 142 LBS | SYSTOLIC BLOOD PRESSURE: 120 MMHG | DIASTOLIC BLOOD PRESSURE: 70 MMHG | TEMPERATURE: 98.4 F | BODY MASS INDEX: 24.24 KG/M2

## 2022-02-24 DIAGNOSIS — F41.9 ANXIETY: Primary | ICD-10-CM

## 2022-02-24 DIAGNOSIS — Z13.29 SCREENING FOR THYROID DISORDER: ICD-10-CM

## 2022-02-24 DIAGNOSIS — Z13.220 LIPID SCREENING: ICD-10-CM

## 2022-02-24 DIAGNOSIS — Z86.73 HISTORY OF STROKE: ICD-10-CM

## 2022-02-24 LAB
ALBUMIN SERPL-MCNC: 4.5 G/DL (ref 3.5–5.2)
ALBUMIN/GLOB SERPL: 1.9 G/DL
ALP SERPL-CCNC: 70 U/L (ref 39–117)
ALT SERPL W P-5'-P-CCNC: 14 U/L (ref 1–33)
ANION GAP SERPL CALCULATED.3IONS-SCNC: 8.9 MMOL/L (ref 5–15)
AST SERPL-CCNC: 21 U/L (ref 1–32)
BILIRUB SERPL-MCNC: 0.3 MG/DL (ref 0–1.2)
BUN SERPL-MCNC: 12 MG/DL (ref 6–20)
BUN/CREAT SERPL: 13.6 (ref 7–25)
CALCIUM SPEC-SCNC: 9.2 MG/DL (ref 8.6–10.5)
CHLORIDE SERPL-SCNC: 104 MMOL/L (ref 98–107)
CHOLEST SERPL-MCNC: 140 MG/DL (ref 0–200)
CO2 SERPL-SCNC: 24.1 MMOL/L (ref 22–29)
CREAT SERPL-MCNC: 0.88 MG/DL (ref 0.57–1)
DEPRECATED RDW RBC AUTO: 40.3 FL (ref 37–54)
ERYTHROCYTE [DISTWIDTH] IN BLOOD BY AUTOMATED COUNT: 12.6 % (ref 12.3–15.4)
GFR SERPL CREATININE-BSD FRML MDRD: 87 ML/MIN/1.73
GLOBULIN UR ELPH-MCNC: 2.4 GM/DL
GLUCOSE SERPL-MCNC: 89 MG/DL (ref 65–99)
HCT VFR BLD AUTO: 36.9 % (ref 34–46.6)
HDLC SERPL-MCNC: 89 MG/DL (ref 40–60)
HGB BLD-MCNC: 12.3 G/DL (ref 12–15.9)
LDLC SERPL CALC-MCNC: 39 MG/DL (ref 0–100)
LDLC/HDLC SERPL: 0.45 {RATIO}
MCH RBC QN AUTO: 29.5 PG (ref 26.6–33)
MCHC RBC AUTO-ENTMCNC: 33.3 G/DL (ref 31.5–35.7)
MCV RBC AUTO: 88.5 FL (ref 79–97)
PLATELET # BLD AUTO: 270 10*3/MM3 (ref 140–450)
PMV BLD AUTO: 11.8 FL (ref 6–12)
POTASSIUM SERPL-SCNC: 3.6 MMOL/L (ref 3.5–5.2)
PROT SERPL-MCNC: 6.9 G/DL (ref 6–8.5)
RBC # BLD AUTO: 4.17 10*6/MM3 (ref 3.77–5.28)
SODIUM SERPL-SCNC: 137 MMOL/L (ref 136–145)
T4 FREE SERPL-MCNC: 0.96 NG/DL (ref 0.93–1.7)
TRIGL SERPL-MCNC: 53 MG/DL (ref 0–150)
TSH SERPL DL<=0.05 MIU/L-ACNC: 0.77 UIU/ML (ref 0.27–4.2)
VLDLC SERPL-MCNC: 12 MG/DL (ref 5–40)
WBC NRBC COR # BLD: 5.3 10*3/MM3 (ref 3.4–10.8)

## 2022-02-24 PROCEDURE — 99213 OFFICE O/P EST LOW 20 MIN: CPT | Performed by: NURSE PRACTITIONER

## 2022-02-24 PROCEDURE — 80061 LIPID PANEL: CPT | Performed by: NURSE PRACTITIONER

## 2022-02-24 PROCEDURE — 84439 ASSAY OF FREE THYROXINE: CPT | Performed by: NURSE PRACTITIONER

## 2022-02-24 PROCEDURE — 80053 COMPREHEN METABOLIC PANEL: CPT | Performed by: NURSE PRACTITIONER

## 2022-02-24 PROCEDURE — 84443 ASSAY THYROID STIM HORMONE: CPT | Performed by: NURSE PRACTITIONER

## 2022-02-24 PROCEDURE — 85027 COMPLETE CBC AUTOMATED: CPT | Performed by: NURSE PRACTITIONER

## 2022-02-24 RX ORDER — DULOXETIN HYDROCHLORIDE 60 MG/1
60 CAPSULE, DELAYED RELEASE ORAL DAILY
COMMUNITY
End: 2022-02-24 | Stop reason: SDUPTHER

## 2022-02-24 RX ORDER — BUPROPION HYDROCHLORIDE 150 MG/1
150 TABLET ORAL DAILY
Qty: 90 TABLET | Refills: 1 | Status: SHIPPED | OUTPATIENT
Start: 2022-02-24 | End: 2022-06-22

## 2022-02-24 RX ORDER — ATORVASTATIN CALCIUM 10 MG/1
10 TABLET, FILM COATED ORAL DAILY
Qty: 90 TABLET | Refills: 1 | Status: SHIPPED | OUTPATIENT
Start: 2022-02-24 | End: 2022-09-21

## 2022-02-24 RX ORDER — HYDROXYZINE HYDROCHLORIDE 25 MG/1
25 TABLET, FILM COATED ORAL
Qty: 90 TABLET | Refills: 1 | Status: SHIPPED | OUTPATIENT
Start: 2022-02-24 | End: 2022-09-21 | Stop reason: SDUPTHER

## 2022-02-24 RX ORDER — DULOXETIN HYDROCHLORIDE 30 MG/1
60 CAPSULE, DELAYED RELEASE ORAL DAILY
Qty: 90 CAPSULE | Refills: 1 | Status: SHIPPED | OUTPATIENT
Start: 2022-02-24 | End: 2022-02-24

## 2022-02-24 NOTE — PATIENT INSTRUCTIONS
"[email or web address]   Generalized Anxiety Disorder, Adult  Generalized anxiety disorder (VIOLETTE) is a mental health condition. Unlike normal worries, anxiety related to VIOLETTE is not triggered by a specific event. These worries do not fade or get better with time. VIOLETTE interferes with relationships, work, and school.  VIOLETTE symptoms can vary from mild to severe. People with severe VIOLETTE can have intense waves of anxiety with physical symptoms that are similar to panic attacks.  What are the causes?  The exact cause of VIOLETTE is not known, but the following are believed to have an impact:  · Differences in natural brain chemicals.  · Genes passed down from parents to children.  · Differences in the way threats are perceived.  · Development during childhood.  · Personality.  What increases the risk?  The following factors may make you more likely to develop this condition:  · Being female.  · Having a family history of anxiety disorders.  · Being very shy.  · Experiencing very stressful life events, such as the death of a loved one.  · Having a very stressful family environment.  What are the signs or symptoms?  People with VIOLETTE often worry excessively about many things in their lives, such as their health and family. Symptoms may also include:  · Mental and emotional symptoms:  ? Worrying excessively about natural disasters.  ? Fear of being late.  ? Difficulty concentrating.  ? Fears that others are judging your performance.  · Physical symptoms:  ? Fatigue.  ? Headaches, muscle tension, muscle twitches, trembling, or feeling shaky.  ? Feeling like your heart is pounding or beating very fast.  ? Feeling out of breath or like you cannot take a deep breath.  ? Having trouble falling asleep or staying asleep, or experiencing restlessness.  ? Sweating.  ? Nausea, diarrhea, or irritable bowel syndrome (IBS).  · Behavioral symptoms:  ? Experiencing erratic moods or irritability.  ? Avoidance of new situations.  ? Avoidance of " people.  ? Extreme difficulty making decisions.  How is this diagnosed?  This condition is diagnosed based on your symptoms and medical history. You will also have a physical exam. Your health care provider may perform tests to rule out other possible causes of your symptoms.  To be diagnosed with VIOLETTE, a person must have anxiety that:  · Is out of his or her control.  · Affects several different aspects of his or her life, such as work and relationships.  · Causes distress that makes him or her unable to take part in normal activities.  · Includes at least three symptoms of VIOLETTE, such as restlessness, fatigue, trouble concentrating, irritability, muscle tension, or sleep problems.  Before your health care provider can confirm a diagnosis of VIOLETTE, these symptoms must be present more days than they are not, and they must last for 6 months or longer.  How is this treated?  This condition may be treated with:  · Medicine. Antidepressant medicine is usually prescribed for long-term daily control. Anti-anxiety medicines may be added in severe cases, especially when panic attacks occur.  · Talk therapy (psychotherapy). Certain types of talk therapy can be helpful in treating VIOLETTE by providing support, education, and guidance. Options include:  ? Cognitive behavioral therapy (CBT). People learn coping skills and self-calming techniques to ease their physical symptoms. They learn to identify unrealistic thoughts and behaviors and to replace them with more appropriate thoughts and behaviors.  ? Acceptance and commitment therapy (ACT). This treatment teaches people how to be mindful as a way to cope with unwanted thoughts and feelings.  ? Biofeedback. This process trains you to manage your body's response (physiological response) through breathing techniques and relaxation methods. You will work with a therapist while machines are used to monitor your physical symptoms.  · Stress management techniques. These include yoga,  meditation, and exercise.  A mental health specialist can help determine which treatment is best for you. Some people see improvement with one type of therapy. However, other people require a combination of therapies.  Follow these instructions at home:  Lifestyle  · Maintain a consistent routine and schedule.  · Anticipate stressful situations. Create a plan, and allow extra time to work with your plan.  · Practice stress management or self-calming techniques that you have learned from your therapist or your health care provider.  General instructions  · Take over-the-counter and prescription medicines only as told by your health care provider.  · Understand that you are likely to have setbacks. Accept this and be kind to yourself as you persist to take better care of yourself.  · Recognize and accept your accomplishments, even if you  them as small.  · Keep all follow-up visits as told by your health care provider. This is important.  Contact a health care provider if:  · Your symptoms do not get better.  · Your symptoms get worse.  · You have signs of depression, such as:  ? A persistently sad or irritable mood.  ? Loss of enjoyment in activities that used to bring you solomon.  ? Change in weight or eating.  ? Changes in sleeping habits.  ? Avoiding friends or family members.  ? Loss of energy for normal tasks.  ? Feelings of guilt or worthlessness.  Get help right away if:  · You have serious thoughts about hurting yourself or others.  If you ever feel like you may hurt yourself or others, or have thoughts about taking your own life, get help right away. Go to your nearest emergency department or:  · Call your local emergency services (361 in the [place]  · Call a suicide crisis helpline, such as the National Suicide Prevention Lifeline at 1-458.318.8649. This is open 24 hours a day in the [place]  · Text the Crisis Text Line at 316520 (in the [place]  Summary  · Generalized anxiety disorder (VIOLETTE) is a mental  health condition that involves worry that is not triggered by a specific event.  · People with VIOLETTE often worry excessively about many things in their lives, such as their health and family.  · VIOLETTE may cause symptoms such as restlessness, trouble concentrating, sleep problems, frequent sweating, nausea, diarrhea, headaches, and trembling or muscle twitching.  · A mental health specialist can help determine which treatment is best for you. Some people see improvement with one type of therapy. However, other people require a combination of therapies.  This information is not intended to replace advice given to you by your health care provider. Make sure you discuss any questions you have with your health care provider.  Document Revised: 10/07/2020 Document Reviewed: 10/07/2020  Elsevier Patient Education © 2021 [employer]

## 2022-02-24 NOTE — PROGRESS NOTES
Chief Complaint  Follow-up (6 month /  medication questions)    Subjective          Laurel Molina is a 36 y.o. female who presents to [facility]    History of Present Illness    Insomnia - pt reports that she is doing well with hydroxyzine. Pt is getting 7 hours of sleep per night.     Anxiety/ depression - well controlled. Pt would like to wean down on cymbalta to see if she can come off med.     History of TIA - pt stopped asa by omission. Pt is compliant with atorvastatin.     Pt concerned with weight gain. Pt is drinking 30 oz water per day. Pt hasn't made dietary changes. Pt has cut back on soda.       PHQ-2 Total Score: 0   PHQ-9 Total Score: 0       Review of Systems   Constitutional: Negative for chills, fatigue and fever.   Respiratory: Negative for cough and shortness of breath.    Cardiovascular: Negative for chest pain and palpitations.   Gastrointestinal: Negative for constipation, diarrhea, nausea and vomiting.   Musculoskeletal: Negative for back pain and neck pain.   Skin: Negative for rash.   Neurological: Negative for dizziness and headaches.          Medical History: has a past medical history of Acid reflux, Allergic rhinitis, Anxiety, Arthritis, Depression, Hyperlipidemia, Hypertension, Leg pain, Seasonal allergies, Sinus trouble, Sleep disturbance, Stroke ([facility]) (3/16/2021), and Thumb fracture (2012).     Surgical History: has a past surgical history that includes Ankle surgery and Posterior cruciate ligament repair (Left, 2014).     Family History: family history includes Arthritis in her brother, father, and mother; COPD in her father; Diabetes in her brother, father, and mother; Heart disease in her brother and father; Hyperlipidemia in her father; Other in her father.     Social History: reports that she has never smoked. She has never used smokeless tobacco. She reports that she does not drink alcohol and does not use drugs.    Allergies:  "Naproxen      Health Maintenance Due   Topic Date Due   • ANNUAL PHYSICAL  Never done   • TDAP/TD VACCINES (2 - Tdap) 06/12/2008   • HEPATITIS C SCREENING  Never done   • PAP SMEAR  06/08/2021   • LIPID PANEL  02/24/2022            Current Outpatient Medications:   •  atorvastatin (LIPITOR) 10 MG tablet, Take 1 tablet by mouth Daily., Disp: 90 tablet, Rfl: 1  •  hydrOXYzine (ATARAX) 25 MG tablet, Take 1 tablet by mouth every night at bedtime., Disp: 90 tablet, Rfl: 1  •  ibuprofen (ADVIL,MOTRIN) 800 MG tablet, Take 800 mg by mouth 2 (Two) Times a Day As Needed., Disp: , Rfl:   •  aspirin 81 MG EC tablet, Take 81 mg by mouth Daily., Disp: , Rfl:   •  buPROPion XL (Wellbutrin XL) 150 MG 24 hr tablet, Take 1 tablet by mouth Daily., Disp: 90 tablet, Rfl: 1      Immunization History   Administered Date(s) Administered   • COVID-19 (PFIZER) PURPLE CAP 06/25/2021, 06/25/2021, 07/16/2021, 07/16/2021   • Td 06/12/1998         Objective       Vitals:    02/24/22 1457   BP: 120/70   Pulse: 110   Temp: 98.4 °F (36.9 °C)   SpO2: 99%   Weight: 64.4 kg (142 lb)   Height: 161.3 cm (63.5\")      Body mass index is 24.76 kg/m².   Wt Readings from Last 3 Encounters:   02/24/22 64.4 kg (142 lb)   10/04/21 61.1 kg (134 lb 11.2 oz)   08/23/21 59 kg (130 lb)      BP Readings from Last 3 Encounters:   02/24/22 120/70   10/04/21 130/89   08/23/21 124/85        Physical Exam  Vitals reviewed.   Constitutional:       Appearance: Normal appearance. She is well-developed.   HENT:      Head: Normocephalic and atraumatic.   Eyes:      Conjunctiva/sclera: Conjunctivae normal.      Pupils: Pupils are equal, round, and reactive to light.   Cardiovascular:      Rate and Rhythm: Normal rate and regular rhythm.      Heart sounds: Normal heart sounds. No murmur heard.      Pulmonary:      Effort: Pulmonary effort is normal.      Breath sounds: Normal breath sounds. No wheezing or rhonchi.   Abdominal:      General: Bowel sounds are normal. There is no " distension.      Palpations: Abdomen is soft.      Tenderness: There is no abdominal tenderness.   Skin:     General: Skin is warm and dry.   Neurological:      Mental Status: She is alert and oriented to person, place, and time.   Psychiatric:         Mood and Affect: Mood and affect normal.         Behavior: Behavior normal.         Thought Content: Thought content normal.         Judgment: Judgment normal.             Result Review :     Common labs    Common Labsle 3/16/21 3/16/21    0913 0913   Glucose  77   BUN  9   Creatinine  0.98 (A)   Sodium  138   Potassium  3.7   Chloride  104   Calcium  9.2   Albumin  4.5   Total Bilirubin  0.41   Alkaline Phosphatase  53   AST (SGOT)  22   ALT (SGPT)  12   WBC 5.77    Hemoglobin 13.3    Hematocrit 41.9    Platelets 258    (A) Abnormal value                     Assessment and Plan        Diagnoses and all orders for this visit:    1. Anxiety (Primary)  -     Discontinue: DULoxetine (CYMBALTA) 30 MG capsule; Take 2 capsules by mouth Daily.  Dispense: 90 capsule; Refill: 1  -     buPROPion XL (Wellbutrin XL) 150 MG 24 hr tablet; Take 1 tablet by mouth Daily.  Dispense: 90 tablet; Refill: 1  -     hydrOXYzine (ATARAX) 25 MG tablet; Take 1 tablet by mouth every night at bedtime.  Dispense: 90 tablet; Refill: 1    2. History of stroke  -     CBC (No Diff)  -     atorvastatin (LIPITOR) 10 MG tablet; Take 1 tablet by mouth Daily.  Dispense: 90 tablet; Refill: 1    3. Lipid screening  -     Lipid Panel  -     Comprehensive Metabolic Panel    4. Screening for thyroid disorder  -     Cancel: TSH      Monitor calorie intake. Decrease carb and sugar intake.   Pt will trial Wellbutrin d/t weight gain with Cymbalta.     Follow Up     Return in about 6 months (around 8/24/2022).    Patient was given instructions and counseling regarding her condition or for health maintenance advice. Please see specific information pulled into the AVS if appropriate.     MICHA Muñiz

## 2022-06-22 RX ORDER — DULOXETIN HYDROCHLORIDE 60 MG/1
60 CAPSULE, DELAYED RELEASE ORAL DAILY
Qty: 90 CAPSULE | Refills: 0 | Status: SHIPPED | OUTPATIENT
Start: 2022-06-22 | End: 2022-08-24 | Stop reason: SDUPTHER

## 2022-08-24 ENCOUNTER — OFFICE VISIT (OUTPATIENT)
Dept: FAMILY MEDICINE CLINIC | Facility: CLINIC | Age: 37
End: 2022-08-24

## 2022-08-24 VITALS
HEIGHT: 64 IN | TEMPERATURE: 99.8 F | HEART RATE: 105 BPM | DIASTOLIC BLOOD PRESSURE: 81 MMHG | SYSTOLIC BLOOD PRESSURE: 131 MMHG | BODY MASS INDEX: 24.11 KG/M2 | WEIGHT: 141.2 LBS | OXYGEN SATURATION: 100 %

## 2022-08-24 DIAGNOSIS — F41.9 ANXIETY: ICD-10-CM

## 2022-08-24 DIAGNOSIS — F51.01 PRIMARY INSOMNIA: Primary | ICD-10-CM

## 2022-08-24 PROCEDURE — 99213 OFFICE O/P EST LOW 20 MIN: CPT | Performed by: NURSE PRACTITIONER

## 2022-08-24 RX ORDER — DULOXETIN HYDROCHLORIDE 60 MG/1
60 CAPSULE, DELAYED RELEASE ORAL DAILY
Qty: 90 CAPSULE | Refills: 1 | Status: SHIPPED | OUTPATIENT
Start: 2022-08-24 | End: 2022-09-21 | Stop reason: SDUPTHER

## 2022-08-24 RX ORDER — TRAZODONE HYDROCHLORIDE 50 MG/1
TABLET ORAL
Qty: 60 TABLET | Refills: 2 | Status: SHIPPED | OUTPATIENT
Start: 2022-08-24 | End: 2022-09-21 | Stop reason: SDUPTHER

## 2022-08-24 NOTE — PATIENT INSTRUCTIONS
Insomnia  Insomnia is a sleep disorder that makes it difficult to fall asleep or stay asleep. Insomnia can cause fatigue, low energy, difficulty concentrating, mood swings, and poor performance at work or school.  There are three different ways to classify insomnia:  Difficulty falling asleep.  Difficulty staying asleep.  Waking up too early in the morning.  Any type of insomnia can be long-term (chronic) or short-term (acute). Both are common. Short-term insomnia usually lasts for three months or less. Chronic insomnia occurs at least three times a week for longer than three months.  What are the causes?  Insomnia may be caused by another condition, situation, or substance, such as:  Anxiety.  Certain medicines.  Gastroesophageal reflux disease (GERD) or other gastrointestinal conditions.  Asthma or other breathing conditions.  Restless legs syndrome, sleep apnea, or other sleep disorders.  Chronic pain.  Menopause.  Stroke.  Abuse of alcohol, tobacco, or illegal drugs.  Mental health conditions, such as depression.  Caffeine.  Neurological disorders, such as Alzheimer's disease.  An overactive thyroid (hyperthyroidism).  Sometimes, the cause of insomnia may not be known.  What increases the risk?  Risk factors for insomnia include:  Gender. Women are affected more often than men.  Age. Insomnia is more common as you get older.  Stress.  Lack of exercise.  Irregular work schedule or working night shifts.  Traveling between different time zones.  Certain medical and mental health conditions.  What are the signs or symptoms?  If you have insomnia, the main symptom is having trouble falling asleep or having trouble staying asleep. This may lead to other symptoms, such as:  Feeling fatigued or having low energy.  Feeling nervous about going to sleep.  Not feeling rested in the morning.  Having trouble concentrating.  Feeling irritable, anxious, or depressed.  How is this diagnosed?  This condition may be diagnosed  based on:  Your symptoms and medical history. Your health care provider may ask about:  Your sleep habits.  Any medical conditions you have.  Your mental health.  A physical exam.  How is this treated?  Treatment for insomnia depends on the cause. Treatment may focus on treating an underlying condition that is causing insomnia. Treatment may also include:  Medicines to help you sleep.  Counseling or therapy.  Lifestyle adjustments to help you sleep better.  Follow these instructions at home:  Eating and drinking    Limit or avoid alcohol, caffeinated beverages, and cigarettes, especially close to bedtime. These can disrupt your sleep.  Do not eat a large meal or eat spicy foods right before bedtime. This can lead to digestive discomfort that can make it hard for you to sleep.    Sleep habits    Keep a sleep diary to help you and your health care provider figure out what could be causing your insomnia. Write down:  When you sleep.  When you wake up during the night.  How well you sleep.  How rested you feel the next day.  Any side effects of medicines you are taking.  What you eat and drink.  Make your bedroom a dark, comfortable place where it is easy to fall asleep.  Put up shades or blackout curtains to block light from outside.  Use a white noise machine to block noise.  Keep the temperature cool.  Limit screen use before bedtime. This includes:  Watching TV.  Using your smartphone, tablet, or computer.  Stick to a routine that includes going to bed and waking up at the same times every day and night. This can help you fall asleep faster. Consider making a quiet activity, such as reading, part of your nighttime routine.  Try to avoid taking naps during the day so that you sleep better at night.  Get out of bed if you are still awake after 15 minutes of trying to sleep. Keep the lights down, but try reading or doing a quiet activity. When you feel sleepy, go back to bed.    General instructions  Take  over-the-counter and prescription medicines only as told by your health care provider.  Exercise regularly, as told by your health care provider. Avoid exercise starting several hours before bedtime.  Use relaxation techniques to manage stress. Ask your health care provider to suggest some techniques that may work well for you. These may include:  Breathing exercises.  Routines to release muscle tension.  Visualizing peaceful scenes.  Make sure that you drive carefully. Avoid driving if you feel very sleepy.  Keep all follow-up visits as told by your health care provider. This is important.  Contact a health care provider if:  You are tired throughout the day.  You have trouble in your daily routine due to sleepiness.  You continue to have sleep problems, or your sleep problems get worse.  Get help right away if:  You have serious thoughts about hurting yourself or someone else.  If you ever feel like you may hurt yourself or others, or have thoughts about taking your own life, get help right away. You can go to your nearest emergency department or call:  Your local emergency services (911 in the [place]  A suicide crisis helpline, such as the National Suicide Prevention Lifeline at 1-599.618.4816. This is open 24 hours a day.  Summary  Insomnia is a sleep disorder that makes it difficult to fall asleep or stay asleep.  Insomnia can be long-term (chronic) or short-term (acute).  Treatment for insomnia depends on the cause. Treatment may focus on treating an underlying condition that is causing insomnia.  Keep a sleep diary to help you and your health care provider figure out what could be causing your insomnia.  This information is not intended to replace advice given to you by your health care provider. Make sure you discuss any questions you have with your health care provider.  Document Revised: 10/28/2021 Document Reviewed: 10/28/2021  Elsevier Patient Education © 2021 [employer]

## 2022-08-24 NOTE — PROGRESS NOTES
Answers for HPI/ROS submitted by the patient on 8/22/2022  Please describe your symptoms.: Anxiety  Have you had these symptoms before?: Yes  How long have you been having these symptoms?: Greater than 2 weeks  Please list any medications you are currently taking for this condition.: Hydroxyzine , Duloxetine  What is the primary reason for your visit?: Other    Chief Complaint  Anxiety (6M follow up) and Insomnia (Pt reports trouble falling asleep)    Subjective          Laurel Molina is a 37 y.o. female who presents to [facility]    History of Present Illness    Anxiety - improved with Cymbalta pt takes at night feels like moods are more stable. Denies any med se.     Insomnia - pt has trouble with falling asleep. Pt has failed hydroxazine, melatonin and benadryl.     PHQ-2 Total Score: 0   PHQ-9 Total Score: 0        Review of Systems   Constitutional: Negative for chills, fatigue and fever.   Respiratory: Negative for cough and shortness of breath.    Cardiovascular: Negative for chest pain and palpitations.   Gastrointestinal: Negative for constipation, diarrhea, nausea and vomiting.   Musculoskeletal: Negative for back pain and neck pain.   Skin: Negative for rash.   Neurological: Negative for dizziness and headaches.   Psychiatric/Behavioral: Negative for sleep disturbance and suicidal ideas. The patient is nervous/anxious.           Medical History: has a past medical history of Acid reflux, Allergic rhinitis, Anxiety, Arthritis, Depression, Hyperlipidemia, Hypertension, Leg pain, Seasonal allergies, Sinus trouble, Sleep disturbance, Stroke (HCC) (3/16/2021), and Thumb fracture (2012).     Surgical History: has a past surgical history that includes Ankle surgery and Posterior cruciate ligament repair (Left, 2014).     Family History: family history includes Arthritis in her brother, father, and mother; COPD in her father; Diabetes in her brother, father, and mother; Heart  "disease in her brother and father; Hyperlipidemia in her father; Other in her father.     Social History: reports that she has never smoked. She has never used smokeless tobacco. She reports that she does not drink alcohol and does not use drugs.    Allergies: Naproxen      Health Maintenance Due   Topic Date Due   • ANNUAL PHYSICAL  Never done   • TDAP/TD VACCINES (2 - Tdap) 06/12/2008   • HEPATITIS C SCREENING  Never done   • PAP SMEAR  06/08/2021   • COVID-19 Vaccine (3 - Booster for Pfizer series) 12/16/2021            Current Outpatient Medications:   •  atorvastatin (LIPITOR) 10 MG tablet, Take 1 tablet by mouth Daily., Disp: 90 tablet, Rfl: 1  •  DULoxetine (Cymbalta) 60 MG capsule, Take 1 capsule by mouth Daily., Disp: 90 capsule, Rfl: 1  •  hydrOXYzine (ATARAX) 25 MG tablet, Take 1 tablet by mouth every night at bedtime., Disp: 90 tablet, Rfl: 1  •  ibuprofen (ADVIL,MOTRIN) 800 MG tablet, Take 800 mg by mouth 2 (Two) Times a Day As Needed., Disp: , Rfl:   •  traZODone (DESYREL) 50 MG tablet, Take 1-2 tabs po qhs prn insomnia, Disp: 60 tablet, Rfl: 2      Immunization History   Administered Date(s) Administered   • COVID-19 (PFIZER) PURPLE CAP 06/25/2021, 06/25/2021, 07/16/2021, 07/16/2021   • Td 06/12/1998         Objective       Vitals:    08/24/22 1412   BP: 131/81   BP Location: Left arm   Patient Position: Sitting   Cuff Size: Adult   Pulse: 105   Temp: 99.8 °F (37.7 °C)   TempSrc: Temporal   SpO2: 100%   Weight: 64 kg (141 lb 3.2 oz)   Height: 162.6 cm (64\")   PainSc: 0-No pain      Body mass index is 24.24 kg/m².   Wt Readings from Last 3 Encounters:   08/24/22 64 kg (141 lb 3.2 oz)   02/24/22 64.4 kg (142 lb)   10/04/21 61.1 kg (134 lb 11.2 oz)      BP Readings from Last 3 Encounters:   08/24/22 131/81   02/24/22 120/70   10/04/21 130/89        Physical Exam  Vitals reviewed.   Constitutional:       Appearance: Normal appearance. She is well-developed.   HENT:      Head: Normocephalic and atraumatic. "   Eyes:      Conjunctiva/sclera: Conjunctivae normal.      Pupils: Pupils are equal, round, and reactive to light.   Cardiovascular:      Rate and Rhythm: Normal rate and regular rhythm.      Heart sounds: Normal heart sounds. No murmur heard.  Pulmonary:      Effort: Pulmonary effort is normal.      Breath sounds: Normal breath sounds. No wheezing or rhonchi.   Abdominal:      General: Bowel sounds are normal. There is no distension.      Palpations: Abdomen is soft.      Tenderness: There is no abdominal tenderness.   Skin:     General: Skin is warm and dry.   Neurological:      Mental Status: She is alert and oriented to person, place, and time.   Psychiatric:         Mood and Affect: Mood and affect normal.         Behavior: Behavior normal.         Thought Content: Thought content normal.         Judgment: Judgment normal.             Result Review :       Common labs    Common Labsle 2/24/22 2/24/22 2/24/22    1526 1526 1526   Glucose   89   BUN   12   Creatinine   0.88   eGFR African Am   87   Sodium   137   Potassium   3.6   Chloride   104   Calcium   9.2   Albumin   4.50   Total Bilirubin   0.3   Alkaline Phosphatase   70   AST (SGOT)   21   ALT (SGPT)   14   WBC 5.30     Hemoglobin 12.3     Hematocrit 36.9     Platelets 270     Total Cholesterol  140    Triglycerides  53    HDL Cholesterol  89 (A)    LDL Cholesterol   39    (A) Abnormal value                             Assessment and Plan        Diagnoses and all orders for this visit:    1. Primary insomnia (Primary)  -     traZODone (DESYREL) 50 MG tablet; Take 1-2 tabs po qhs prn insomnia  Dispense: 60 tablet; Refill: 2    2. Anxiety  -     DULoxetine (Cymbalta) 60 MG capsule; Take 1 capsule by mouth Daily.  Dispense: 90 capsule; Refill: 1      Avoid any electronic use further at least 30 minutes prior to bedtime.  Cell phone screens, tablets and TVs imitate daylight, so your brain can become confused on the time of day.  No caffeine use in the late  afternoon and evenings.      Follow Up     Return in about 4 weeks (around 9/21/2022) for Next scheduled follow up.    Patient was given instructions and counseling regarding her condition or for health maintenance advice. Please see specific information pulled into the AVS if appropriate.     MICHA Muñiz

## 2022-09-15 NOTE — PROGRESS NOTES
Chief Complaint  Insomnia (Follow up)    Subjective          Laurel Molina is a 37 y.o. female who presents to [facility]    History of Present Illness    Insomnia - pt is sleeping approx 7 hours per night.     Anxiety - improved with medication.       Answers for HPI/ROS submitted by the patient on 9/20/2022  Please describe your symptoms.: Follow up for insomnia  Have you had these symptoms before?: Yes  How long have you been having these symptoms?: 1-4 days  Please list any medications you are currently taking for this condition.: Trazadone 50mg  What is the primary reason for your visit?: Other      PHQ-2 Total Score: 0   PHQ-9 Total Score: 0        Review of Systems   Constitutional: Negative for chills, fatigue and fever.   Respiratory: Negative for cough and shortness of breath.    Cardiovascular: Negative for chest pain and palpitations.   Gastrointestinal: Negative for constipation, diarrhea, nausea and vomiting.   Musculoskeletal: Negative for back pain and neck pain.   Skin: Negative for rash.   Neurological: Negative for dizziness and headaches.          Medical History: has a past medical history of Acid reflux, Allergic rhinitis, Anxiety, Arthritis, Depression, Hyperlipidemia, Hypertension, Leg pain, Seasonal allergies, Sinus trouble, Sleep disturbance, Stroke ([facility]) (3/16/2021), and Thumb fracture (2012).     Surgical History: has a past surgical history that includes Ankle surgery and Posterior cruciate ligament repair (Left, 2014).     Family History: family history includes Arthritis in her brother, father, and mother; COPD in her father; Diabetes in her brother, father, and mother; Heart disease in her brother and father; Hyperlipidemia in her father; Other in her father.     Social History: reports that she has never smoked. She has never used smokeless tobacco. She reports that she does not drink alcohol and does not use drugs.    Allergies:  Naproxen      Health Maintenance Due   Topic Date Due   • ANNUAL PHYSICAL  Never done   • TDAP/TD VACCINES (2 - Tdap) 06/12/2008   • HEPATITIS C SCREENING  Never done   • PAP SMEAR  06/08/2021   • COVID-19 Vaccine (3 - Booster for Pfizer series) 09/10/2021            Current Outpatient Medications:   •  DULoxetine (Cymbalta) 60 MG capsule, Take 1 capsule by mouth Daily., Disp: 90 capsule, Rfl: 3  •  hydrOXYzine (ATARAX) 25 MG tablet, Take 1 tablet by mouth every night at bedtime., Disp: 90 tablet, Rfl: 3  •  ibuprofen (ADVIL,MOTRIN) 800 MG tablet, Take 800 mg by mouth 2 (Two) Times a Day As Needed., Disp: , Rfl:   •  traZODone (DESYREL) 50 MG tablet, Take 1-2 tabs po qhs prn insomnia, Disp: 90 tablet, Rfl: 3      Immunization History   Administered Date(s) Administered   • COVID-19 (PFIZER) PURPLE CAP 06/25/2021, 06/25/2021, 07/16/2021, 07/16/2021   • Td 06/12/1998         Objective       Vitals:    09/21/22 1019   BP: 117/81   Pulse: 100   Temp: 99.1 °F (37.3 °C)   TempSrc: Temporal   SpO2: 100%   Weight: 63.9 kg (140 lb 12.8 oz)      Body mass index is 24.17 kg/m².   Wt Readings from Last 3 Encounters:   09/21/22 63.9 kg (140 lb 12.8 oz)   08/24/22 64 kg (141 lb 3.2 oz)   02/24/22 64.4 kg (142 lb)      BP Readings from Last 3 Encounters:   09/21/22 117/81   08/24/22 131/81   02/24/22 120/70        Physical Exam  Vitals reviewed.   Constitutional:       Appearance: Normal appearance. She is well-developed.   HENT:      Head: Normocephalic and atraumatic.   Eyes:      Conjunctiva/sclera: Conjunctivae normal.      Pupils: Pupils are equal, round, and reactive to light.   Cardiovascular:      Rate and Rhythm: Normal rate and regular rhythm.      Heart sounds: Normal heart sounds. No murmur heard.  Pulmonary:      Effort: Pulmonary effort is normal.      Breath sounds: Normal breath sounds. No wheezing or rhonchi.   Abdominal:      General: Bowel sounds are normal. There is no distension.      Palpations: Abdomen is  soft.      Tenderness: There is no abdominal tenderness.   Skin:     General: Skin is warm and dry.   Neurological:      Mental Status: She is alert and oriented to person, place, and time.   Psychiatric:         Mood and Affect: Mood and affect normal.         Behavior: Behavior normal.         Thought Content: Thought content normal.         Judgment: Judgment normal.             Result Review :       Common labs    Common Labs 2/24/22 2/24/22 2/24/22    1526 1526 1526   Glucose   89   BUN   12   Creatinine   0.88   eGFR African Am   87   Sodium   137   Potassium   3.6   Chloride   104   Calcium   9.2   Albumin   4.50   Total Bilirubin   0.3   Alkaline Phosphatase   70   AST (SGOT)   21   ALT (SGPT)   14   WBC 5.30     Hemoglobin 12.3     Hematocrit 36.9     Platelets 270     Total Cholesterol  140    Triglycerides  53    HDL Cholesterol  89 (A)    LDL Cholesterol   39    (A) Abnormal value                             Assessment and Plan        Diagnoses and all orders for this visit:    1. Anxiety (Primary)  -     DULoxetine (Cymbalta) 60 MG capsule; Take 1 capsule by mouth Daily.  Dispense: 90 capsule; Refill: 3  -     hydrOXYzine (ATARAX) 25 MG tablet; Take 1 tablet by mouth every night at bedtime.  Dispense: 90 tablet; Refill: 3    2. History of stroke    3. Primary insomnia  -     traZODone (DESYREL) 50 MG tablet; Take 1-2 tabs po qhs prn insomnia  Dispense: 90 tablet; Refill: 3    4. Visit for screening mammogram  -     Mammo Screening Digital Tomosynthesis Bilateral With CAD; Future          Follow Up     Return in about 1 year (around 9/21/2023) for Annual physical, Well Women Exam.    Patient was given instructions and counseling regarding her condition or for health maintenance advice. Please see specific information pulled into the AVS if appropriate.     MICHA Muñiz

## 2022-09-21 ENCOUNTER — OFFICE VISIT (OUTPATIENT)
Dept: FAMILY MEDICINE CLINIC | Facility: CLINIC | Age: 37
End: 2022-09-21

## 2022-09-21 VITALS
OXYGEN SATURATION: 100 % | HEART RATE: 100 BPM | TEMPERATURE: 99.1 F | DIASTOLIC BLOOD PRESSURE: 81 MMHG | SYSTOLIC BLOOD PRESSURE: 117 MMHG | BODY MASS INDEX: 24.17 KG/M2 | WEIGHT: 140.8 LBS

## 2022-09-21 DIAGNOSIS — Z12.31 VISIT FOR SCREENING MAMMOGRAM: ICD-10-CM

## 2022-09-21 DIAGNOSIS — F51.01 PRIMARY INSOMNIA: ICD-10-CM

## 2022-09-21 DIAGNOSIS — F41.9 ANXIETY: Primary | ICD-10-CM

## 2022-09-21 DIAGNOSIS — Z86.73 HISTORY OF STROKE: ICD-10-CM

## 2022-09-21 PROCEDURE — 99213 OFFICE O/P EST LOW 20 MIN: CPT | Performed by: NURSE PRACTITIONER

## 2022-09-21 RX ORDER — ATORVASTATIN CALCIUM 10 MG/1
10 TABLET, FILM COATED ORAL DAILY
Qty: 90 TABLET | Refills: 1 | Status: CANCELLED | OUTPATIENT
Start: 2022-09-21

## 2022-09-21 RX ORDER — HYDROXYZINE HYDROCHLORIDE 25 MG/1
25 TABLET, FILM COATED ORAL
Qty: 90 TABLET | Refills: 3 | Status: SHIPPED | OUTPATIENT
Start: 2022-09-21

## 2022-09-21 RX ORDER — TRAZODONE HYDROCHLORIDE 50 MG/1
TABLET ORAL
Qty: 90 TABLET | Refills: 3 | Status: SHIPPED | OUTPATIENT
Start: 2022-09-21

## 2022-09-21 RX ORDER — DULOXETIN HYDROCHLORIDE 60 MG/1
60 CAPSULE, DELAYED RELEASE ORAL DAILY
Qty: 90 CAPSULE | Refills: 3 | Status: SHIPPED | OUTPATIENT
Start: 2022-09-21

## 2022-09-21 NOTE — PATIENT INSTRUCTIONS
Insomnia  Insomnia is a sleep disorder that makes it difficult to fall asleep or stay asleep. Insomnia can cause fatigue, low energy, difficulty concentrating, mood swings, and poor performance at work or school.  There are three different ways to classify insomnia:  Difficulty falling asleep.  Difficulty staying asleep.  Waking up too early in the morning.  Any type of insomnia can be long-term (chronic) or short-term (acute). Both are common. Short-term insomnia usually lasts for three months or less. Chronic insomnia occurs at least three times a week for longer than three months.  What are the causes?  Insomnia may be caused by another condition, situation, or substance, such as:  Anxiety.  Certain medicines.  Gastroesophageal reflux disease (GERD) or other gastrointestinal conditions.  Asthma or other breathing conditions.  Restless legs syndrome, sleep apnea, or other sleep disorders.  Chronic pain.  Menopause.  Stroke.  Abuse of alcohol, tobacco, or illegal drugs.  Mental health conditions, such as depression.  Caffeine.  Neurological disorders, such as Alzheimer's disease.  An overactive thyroid (hyperthyroidism).  Sometimes, the cause of insomnia may not be known.  What increases the risk?  Risk factors for insomnia include:  Gender. Women are affected more often than men.  Age. Insomnia is more common as you get older.  Stress.  Lack of exercise.  Irregular work schedule or working night shifts.  Traveling between different time zones.  Certain medical and mental health conditions.  What are the signs or symptoms?  If you have insomnia, the main symptom is having trouble falling asleep or having trouble staying asleep. This may lead to other symptoms, such as:  Feeling fatigued or having low energy.  Feeling nervous about going to sleep.  Not feeling rested in the morning.  Having trouble concentrating.  Feeling irritable, anxious, or depressed.  How is this diagnosed?  This condition may be diagnosed  based on:  Your symptoms and medical history. Your health care provider may ask about:  Your sleep habits.  Any medical conditions you have.  Your mental health.  A physical exam.  How is this treated?  Treatment for insomnia depends on the cause. Treatment may focus on treating an underlying condition that is causing insomnia. Treatment may also include:  Medicines to help you sleep.  Counseling or therapy.  Lifestyle adjustments to help you sleep better.  Follow these instructions at home:  Eating and drinking    Limit or avoid alcohol, caffeinated beverages, and cigarettes, especially close to bedtime. These can disrupt your sleep.  Do not eat a large meal or eat spicy foods right before bedtime. This can lead to digestive discomfort that can make it hard for you to sleep.  Sleep habits    Keep a sleep diary to help you and your health care provider figure out what could be causing your insomnia. Write down:  When you sleep.  When you wake up during the night.  How well you sleep.  How rested you feel the next day.  Any side effects of medicines you are taking.  What you eat and drink.  Make your bedroom a dark, comfortable place where it is easy to fall asleep.  Put up shades or blackout curtains to block light from outside.  Use a white noise machine to block noise.  Keep the temperature cool.  Limit screen use before bedtime. This includes:  Watching TV.  Using your smartphone, tablet, or computer.  Stick to a routine that includes going to bed and waking up at the same times every day and night. This can help you fall asleep faster. Consider making a quiet activity, such as reading, part of your nighttime routine.  Try to avoid taking naps during the day so that you sleep better at night.  Get out of bed if you are still awake after 15 minutes of trying to sleep. Keep the lights down, but try reading or doing a quiet activity. When you feel sleepy, go back to bed.  General instructions  Take over-the-counter  and prescription medicines only as told by your health care provider.  Exercise regularly, as told by your health care provider. Avoid exercise starting several hours before bedtime.  Use relaxation techniques to manage stress. Ask your health care provider to suggest some techniques that may work well for you. These may include:  Breathing exercises.  Routines to release muscle tension.  Visualizing peaceful scenes.  Make sure that you drive carefully. Avoid driving if you feel very sleepy.  Keep all follow-up visits as told by your health care provider. This is important.  Contact a health care provider if:  You are tired throughout the day.  You have trouble in your daily routine due to sleepiness.  You continue to have sleep problems, or your sleep problems get worse.  Get help right away if:  You have serious thoughts about hurting yourself or someone else.  If you ever feel like you may hurt yourself or others, or have thoughts about taking your own life, get help right away. You can go to your nearest emergency department or call:  Your local emergency services (911 in the [place]  A suicide crisis helpline, such as the National Suicide Prevention Lifeline at 1-408.116.8160 or 188 in the [place] This is open 24 hours a day.  Summary  Insomnia is a sleep disorder that makes it difficult to fall asleep or stay asleep.  Insomnia can be long-term (chronic) or short-term (acute).  Treatment for insomnia depends on the cause. Treatment may focus on treating an underlying condition that is causing insomnia.  Keep a sleep diary to help you and your health care provider figure out what could be causing your insomnia.  This information is not intended to replace advice given to you by your health care provider. Make sure you discuss any questions you have with your health care provider.  Document Revised: 07/13/2022 Document Reviewed: 10/28/2021  Elsevier Patient Education © 2022 [employer]

## 2022-10-03 NOTE — PROGRESS NOTES
Female Physical / PAP Note      Patient Name: Laurel Molina  : 1985   MRN: [id number]     Chief Complaint:    Chief Complaint   Patient presents with   • Gynecologic Exam       History of Present Illness: Laurel Molina is a 37 y.o. female who is here today for their annual health maintenance and physical.    Pt reports being on antibiotic recently.       Subjective      Review of Systems:   Review of Systems   Constitutional: Negative for chills, fatigue and fever.   Respiratory: Negative for cough and shortness of breath.    Cardiovascular: Negative for chest pain and palpitations.   Gastrointestinal: Negative for constipation, diarrhea, nausea and vomiting.   Musculoskeletal: Negative for back pain and neck pain.   Skin: Negative for rash.   Neurological: Negative for dizziness and headaches.      Breast - No tenderness, lumps, discharge, or blood from nipples.      Past Medical History, Social History, Family History and Care Team were all reviewed with patient and updated as appropriate.     Medications:     Current Outpatient Medications:   •  DULoxetine (Cymbalta) 60 MG capsule, Take 1 capsule by mouth Daily., Disp: 90 capsule, Rfl: 3  •  hydrOXYzine (ATARAX) 25 MG tablet, Take 1 tablet by mouth every night at bedtime., Disp: 90 tablet, Rfl: 3  •  ibuprofen (ADVIL,MOTRIN) 800 MG tablet, Take 800 mg by mouth 2 (Two) Times a Day As Needed., Disp: , Rfl:   •  traZODone (DESYREL) 50 MG tablet, Take 1-2 tabs po qhs prn insomnia, Disp: 90 tablet, Rfl: 3  •  fluconazole (Diflucan) 150 MG tablet, Take 1 tablet by mouth Every 72 (Seventy-Two) Hours., Disp: 2 tablet, Rfl: 0    Allergies:   Allergies   Allergen Reactions   • Naproxen Hives and Anaphylaxis         Colorectal Screening:     Last Completed Colonoscopy     This patient has no relevant Health Maintenance data.         PAP:    Last Completed Pap Smear     This patient has no relevant Health Maintenance data.         LMP: No LMP recorded.          Objective     Physical Exam:  Vital Signs:   Vitals:    10/05/22 0909   BP: 127/83   Pulse: 105   Temp: 99.9 °F (37.7 °C)   TempSrc: Temporal   SpO2: 100%   Weight: 64.6 kg (142 lb 6.4 oz)     Body mass index is 24.44 kg/m².     Physical Exam  Vitals reviewed.   Constitutional:       Appearance: Normal appearance. She is well-developed.   HENT:      Head: Normocephalic and atraumatic.   Eyes:      Conjunctiva/sclera: Conjunctivae normal.      Pupils: Pupils are equal, round, and reactive to light.   Cardiovascular:      Rate and Rhythm: Normal rate and regular rhythm.      Heart sounds: Normal heart sounds. No murmur heard.  Pulmonary:      Effort: Pulmonary effort is normal.      Breath sounds: Normal breath sounds. No wheezing or rhonchi.   Abdominal:      General: Bowel sounds are normal. There is no distension.      Palpations: Abdomen is soft.      Tenderness: There is no abdominal tenderness.   Genitourinary:     Vagina: Vaginal discharge present.      Cervix: Discharge present. No friability.      Comments: Thick white vaginal discharge.   Green discharge on cervical os.   Skin:     General: Skin is warm and dry.   Neurological:      Mental Status: She is alert and oriented to person, place, and time.   Psychiatric:         Mood and Affect: Mood and affect normal.         Behavior: Behavior normal.         Thought Content: Thought content normal.         Judgment: Judgment normal.         Procedures    Assessment / Plan      Assessment/Plan:   Diagnoses and all orders for this visit:    1. Routine gynecological examination (Primary)  -     IgP, Aptima HPV  -     Gardnerella vaginalis, Trichomonas vaginalis, Candida albicans, DNA - Swab, Vagina  -     Cancel: Chlamydia trachomatis, Neisseria gonorrhoeae, Trichomonas vaginalis, PCR - Swab, Vagina; Future    2. Acute vaginitis  -     fluconazole (Diflucan) 150 MG tablet; Take 1 tablet by mouth Every 72 (Seventy-Two) Hours.  Dispense: 2 tablet; Refill:  0  -     Chlamydia trachomatis, Neisseria gonorrhoeae, PCR - Swab, Cervix  -     Cancel: Chlamydia trachomatis, Neisseria gonorrhoeae, Trichomonas vaginalis, PCR - Swab, Vagina; Future    3. Sore throat  -     POCT CAREN SARS-CoV-2 Antigen JOSÉ  -     Cancel: Chlamydia trachomatis, Neisseria gonorrhoeae, Trichomonas vaginalis, PCR - Swab, Vagina; Future         The wrong gc/chlamydia swab was collected. Pt will return to office to do urine gc/chlam.      Follow Up:   Return for Pending results.    Healthcare Maintenance:     Counseled on monthly breast self exams.    Counseled on diet and exercise.    Counseled on weightbearing exercise.    Laurel Molina voices understanding and acceptance of this advice and will call back with any further questions or concerns. AVS with preventive healthcare tips printed for patient.     MICHA Muñiz

## 2022-10-05 ENCOUNTER — OFFICE VISIT (OUTPATIENT)
Dept: FAMILY MEDICINE CLINIC | Facility: CLINIC | Age: 37
End: 2022-10-05

## 2022-10-05 ENCOUNTER — TELEPHONE (OUTPATIENT)
Dept: FAMILY MEDICINE CLINIC | Facility: CLINIC | Age: 37
End: 2022-10-05

## 2022-10-05 VITALS
BODY MASS INDEX: 24.44 KG/M2 | HEART RATE: 105 BPM | DIASTOLIC BLOOD PRESSURE: 83 MMHG | SYSTOLIC BLOOD PRESSURE: 127 MMHG | TEMPERATURE: 99.9 F | OXYGEN SATURATION: 100 % | WEIGHT: 142.4 LBS

## 2022-10-05 DIAGNOSIS — Z01.419 ROUTINE GYNECOLOGICAL EXAMINATION: Primary | ICD-10-CM

## 2022-10-05 DIAGNOSIS — N76.0 ACUTE VAGINITIS: ICD-10-CM

## 2022-10-05 DIAGNOSIS — J02.9 SORE THROAT: ICD-10-CM

## 2022-10-05 LAB
C TRACH RRNA CVX QL NAA+PROBE: ABNORMAL
CANDIDA SPECIES: NEGATIVE
EXPIRATION DATE: NORMAL
GARDNERELLA VAGINALIS: POSITIVE
INTERNAL CONTROL: NORMAL
Lab: NORMAL
N GONORRHOEA RRNA SPEC QL NAA+PROBE: ABNORMAL
SARS-COV-2 AG UPPER RESP QL IA.RAPID: NOT DETECTED
T VAGINALIS DNA VAG QL PROBE+SIG AMP: NEGATIVE

## 2022-10-05 PROCEDURE — 99395 PREV VISIT EST AGE 18-39: CPT | Performed by: NURSE PRACTITIONER

## 2022-10-05 PROCEDURE — 87624 HPV HI-RISK TYP POOLED RSLT: CPT | Performed by: NURSE PRACTITIONER

## 2022-10-05 PROCEDURE — G0123 SCREEN CERV/VAG THIN LAYER: HCPCS | Performed by: NURSE PRACTITIONER

## 2022-10-05 PROCEDURE — 87426 SARSCOV CORONAVIRUS AG IA: CPT | Performed by: NURSE PRACTITIONER

## 2022-10-05 PROCEDURE — 87660 TRICHOMONAS VAGIN DIR PROBE: CPT | Performed by: NURSE PRACTITIONER

## 2022-10-05 PROCEDURE — 87110 CHLAMYDIA CULTURE: CPT | Performed by: NURSE PRACTITIONER

## 2022-10-05 PROCEDURE — 87140 CULTURE TYPE IMMUNOFLUORESC: CPT | Performed by: NURSE PRACTITIONER

## 2022-10-05 PROCEDURE — 87480 CANDIDA DNA DIR PROBE: CPT | Performed by: NURSE PRACTITIONER

## 2022-10-05 PROCEDURE — 87510 GARDNER VAG DNA DIR PROBE: CPT | Performed by: NURSE PRACTITIONER

## 2022-10-05 RX ORDER — FLUCONAZOLE 150 MG/1
150 TABLET ORAL
Qty: 2 TABLET | Refills: 0 | Status: SHIPPED | OUTPATIENT
Start: 2022-10-05 | End: 2023-03-01

## 2022-10-05 NOTE — PATIENT INSTRUCTIONS
Preventive Care 40-64 Years Old, Female  Preventive care refers to lifestyle choices and visits with your health care provider that can promote health and wellness. Preventive care visits are also called wellness exams.  What can I expect for my preventive care visit?  Counseling  Your health care provider may ask you questions about your:  Medical history, including:  Past medical problems.  Family medical history.  Pregnancy history.  Current health, including:  Menstrual cycle.  Method of birth control.  Emotional well-being.  Home life and relationship well-being.  Sexual activity and sexual health.  Lifestyle, including:  Alcohol, nicotine or tobacco, and drug use.  Access to firearms.  Diet, exercise, and sleep habits.  Work and work environment.  Sunscreen use.  Safety issues such as seatbelt and bike helmet use.  Physical exam  Your health care provider will check your:  Height and weight. These may be used to calculate your BMI (body mass index). BMI is a measurement that tells if you are at a healthy weight.  Waist circumference. This measures the distance around your waistline. This measurement also tells if you are at a healthy weight and may help predict your risk of certain diseases, such as type 2 diabetes and high blood pressure.  Heart rate and blood pressure.  Body temperature.  Skin for abnormal spots.  What immunizations do I need?  Vaccines are usually given at various ages, according to a schedule. Your health care provider will recommend vaccines for you based on your age, medical history, and lifestyle or other factors, such as travel or where you work.  What tests do I need?  Screening  Your health care provider may recommend screening tests for certain conditions. This may include:  Lipid and cholesterol levels.  Diabetes screening. This is done by checking your blood sugar (glucose) after you have not eaten for a while (fasting).  Pelvic exam and Pap test.  Hepatitis B test.  Hepatitis C  test.  HIV (human immunodeficiency virus) test.  STI (sexually transmitted infection) testing, if you are at risk.  Lung cancer screening.  Colorectal cancer screening.  Mammogram. Talk with your health care provider about when you should start having regular mammograms. This may depend on whether you have a family history of breast cancer.  BRCA-related cancer screening. This may be done if you have a family history of breast, ovarian, tubal, or peritoneal cancers.  Bone density scan. This is done to screen for osteoporosis.  Talk with your health care provider about your test results, treatment options, and if necessary, the need for more tests.  Follow these instructions at home:  Eating and drinking    Eat a diet that includes fresh fruits and vegetables, whole grains, lean protein, and low-fat dairy products.  Take vitamin and mineral supplements as recommended by your health care provider.  Do not drink alcohol if:  Your health care provider tells you not to drink.  You are pregnant, may be pregnant, or are planning to become pregnant.  If you drink alcohol:  Limit how much you have to 0-1 drink a day.  Know how much alcohol is in your drink. In the [place] one drink equals one 12 oz bottle of beer (355 mL), one 5 oz glass of wine (148 mL), or one 1½ oz glass of hard liquor (44 mL).  Lifestyle  Brush your teeth every morning and night with fluoride toothpaste. Floss one time each day.  Exercise for at least 30 minutes 5 or more days each week.  Do not use any products that contain nicotine or tobacco. These products include cigarettes, chewing tobacco, and vaping devices, such as e-cigarettes. If you need help quitting, ask your health care provider.  Do not use drugs.  If you are sexually active, practice safe sex. Use a condom or other form of protection to prevent STIs.  If you do not wish to become pregnant, use a form of birth control. If you plan to become pregnant, see your health care provider for a  prepregnancy visit.  Take aspirin only as told by your health care provider. Make sure that you understand how much to take and what form to take. Work with your health care provider to find out whether it is safe and beneficial for you to take aspirin daily.  Find healthy ways to manage stress, such as:  Meditation, yoga, or listening to music.  Journaling.  Talking to a trusted person.  Spending time with friends and family.  Minimize exposure to UV radiation to reduce your risk of skin cancer.  Safety  Always wear your seat belt while driving or riding in a vehicle.  Do not drive:  If you have been drinking alcohol. Do not ride with someone who has been drinking.  When you are tired or distracted.  While texting.  If you have been using any mind-altering substances or drugs.  Wear a helmet and other protective equipment during sports activities.  If you have firearms in your house, make sure you follow all gun safety procedures.  Seek help if you have been physically or sexually abused.  What's next?  Visit your health care provider once a year for an annual wellness visit.  Ask your health care provider how often you should have your eyes and teeth checked.  Stay up to date on all vaccines.  This information is not intended to replace advice given to you by your health care provider. Make sure you discuss any questions you have with your health care provider.  Document Revised: 06/15/2022 Document Reviewed: 06/15/2022  Elsevier Patient Education © 2022 [employer]

## 2022-10-05 NOTE — TELEPHONE ENCOUNTER
[facility] lab called and the wrong swab was used for the Chlamydia trachomatis, neisseria gonorrhoeae causing it to be invalid. I have made Justino and Sondra aware of this. The lab advised retesting if we needed this test to be ran. Please advise

## 2022-10-06 RX ORDER — METRONIDAZOLE 500 MG/1
500 TABLET ORAL 2 TIMES DAILY
Qty: 14 TABLET | Refills: 0 | Status: SHIPPED | OUTPATIENT
Start: 2022-10-06 | End: 2023-03-01

## 2022-10-09 LAB — C TRACH SPEC QL CULT: NEGATIVE

## 2022-10-12 LAB
CYTOLOGIST CVX/VAG CYTO: NORMAL
CYTOLOGY CVX/VAG DOC CYTO: NORMAL
CYTOLOGY CVX/VAG DOC THIN PREP: NORMAL
DX ICD CODE: NORMAL
HIV 1 & 2 AB SER-IMP: NORMAL
HPV I/H RISK 4 DNA CVX QL PROBE+SIG AMP: NEGATIVE
Lab: NORMAL
OTHER STN SPEC: NORMAL
STAT OF ADQ CVX/VAG CYTO-IMP: NORMAL

## 2023-03-01 ENCOUNTER — OFFICE VISIT (OUTPATIENT)
Dept: FAMILY MEDICINE CLINIC | Facility: CLINIC | Age: 38
End: 2023-03-01
Payer: COMMERCIAL

## 2023-03-01 VITALS
BODY MASS INDEX: 25.44 KG/M2 | HEART RATE: 100 BPM | HEIGHT: 64 IN | WEIGHT: 149 LBS | OXYGEN SATURATION: 100 % | DIASTOLIC BLOOD PRESSURE: 84 MMHG | TEMPERATURE: 97.3 F | SYSTOLIC BLOOD PRESSURE: 127 MMHG

## 2023-03-01 DIAGNOSIS — R04.0 SEVERE EPISTAXIS: Primary | ICD-10-CM

## 2023-03-01 PROCEDURE — 99213 OFFICE O/P EST LOW 20 MIN: CPT | Performed by: NURSE PRACTITIONER

## 2023-03-01 NOTE — PROGRESS NOTES
Chief Complaint  Nose Bleed    Subjective          Laurel Molina is a 37 y.o. female who presents to [facility]    History of Present Illness    Started having nosebleeds again, happening anytime.  Will be sitting and blood starts pouring out. Had cauterization for nose bleeds in 2011 and has done well until now.  No trauma or injury.    PHQ-2 Total Score:     PHQ-9 Total Score:          Review of Systems       Medical History: has a past medical history of Acid reflux, Allergic rhinitis, Anxiety, Arthritis, Depression, Hyperlipidemia, Hypertension, Leg pain, Seasonal allergies, Sinus trouble, Sleep disturbance, Stroke (HCC) (3/16/2021), and Thumb fracture (2012).     Surgical History: has a past surgical history that includes Ankle surgery and Posterior cruciate ligament repair (Left, 2014).     Family History: family history includes Arthritis in her brother, father, and mother; COPD in her father; Diabetes in her brother, father, and mother; Heart disease in her brother and father; Hyperlipidemia in her father; Other in her father.     Social History: reports that she has never smoked. She has never used smokeless tobacco. She reports that she does not drink alcohol and does not use drugs.    Allergies: Naproxen      Health Maintenance Due   Topic Date Due   • TDAP/TD VACCINES (2 - Tdap) 06/12/2008   • HEPATITIS C SCREENING  Never done   • ANNUAL PHYSICAL  Never done   • LIPID PANEL  02/24/2023            Current Outpatient Medications:   •  DULoxetine (Cymbalta) 60 MG capsule, Take 1 capsule by mouth Daily., Disp: 90 capsule, Rfl: 3  •  hydrOXYzine (ATARAX) 25 MG tablet, Take 1 tablet by mouth every night at bedtime., Disp: 90 tablet, Rfl: 3  •  ibuprofen (ADVIL,MOTRIN) 800 MG tablet, Take 1 tablet by mouth 2 (Two) Times a Day As Needed., Disp: , Rfl:   •  traZODone (DESYREL) 50 MG tablet, Take 1-2 tabs po qhs prn insomnia, Disp: 90 tablet, Rfl: 3      Immunization History  "  Administered Date(s) Administered   • COVID-19 (PFIZER) PURPLE CAP 06/25/2021, 06/25/2021, 07/16/2021, 07/16/2021   • Td 06/12/1998         Objective       Vitals:    03/01/23 1405   BP: 127/84   BP Location: Right arm   Patient Position: Sitting   Pulse: 100   Temp: 97.3 °F (36.3 °C)   SpO2: 100%   Weight: 67.6 kg (149 lb)   Height: 162.6 cm (64\")      Body mass index is 25.58 kg/m².   Wt Readings from Last 3 Encounters:   03/01/23 67.6 kg (149 lb)   02/10/23 65.8 kg (145 lb)   10/05/22 64.6 kg (142 lb 6.4 oz)      BP Readings from Last 3 Encounters:   03/01/23 127/84   02/10/23 139/95   10/05/22 127/83        Physical Exam  Vitals reviewed.   Constitutional:       Appearance: Normal appearance.   HENT:      Head: Normocephalic and atraumatic.      Nose:      Right Turbinates: Enlarged and swollen.      Left Turbinates: Enlarged and swollen.   Eyes:      Conjunctiva/sclera: Conjunctivae normal.      Pupils: Pupils are equal, round, and reactive to light.   Cardiovascular:      Rate and Rhythm: Normal rate and regular rhythm.      Pulses: Normal pulses.      Heart sounds: Normal heart sounds.   Pulmonary:      Effort: Pulmonary effort is normal.      Breath sounds: Normal breath sounds.   Skin:     General: Skin is warm and dry.   Neurological:      Mental Status: She is alert and oriented to person, place, and time.   Psychiatric:         Mood and Affect: Mood normal.         Behavior: Behavior normal.         Thought Content: Thought content normal.         Judgment: Judgment normal.             Result Review :                          Assessment and Plan        Diagnoses and all orders for this visit:    1. Severe epistaxis (Primary)  -     Ambulatory Referral to ENT (Otolaryngology)          Follow Up     Return if symptoms worsen or fail to improve, for Next scheduled follow up.    Patient was given instructions and counseling regarding her condition or for health maintenance advice. Please see specific " information pulled into the AVS if appropriate.     Shaila Rolon, APRN

## 2023-03-16 ENCOUNTER — HOSPITAL ENCOUNTER (OUTPATIENT)
Dept: INFUSION THERAPY | Facility: HOSPITAL | Age: 38
Discharge: HOME | End: 2023-03-16
Admitting: FAMILY MEDICINE
Payer: OTHER MISCELLANEOUS

## 2023-03-16 VITALS
SYSTOLIC BLOOD PRESSURE: 143 MMHG | OXYGEN SATURATION: 100 % | WEIGHT: 149.69 LBS | RESPIRATION RATE: 20 BRPM | TEMPERATURE: 98.3 F | DIASTOLIC BLOOD PRESSURE: 94 MMHG | BODY MASS INDEX: 25.56 KG/M2 | HEART RATE: 98 BPM | HEIGHT: 64 IN
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
## 2023-03-16 PROCEDURE — 90471 IMMUNIZATION ADMIN: CPT

## 2023-03-16 PROCEDURE — 90675 RABIES VACCINE IM: CPT | Performed by: FAMILY MEDICINE

## 2023-03-16 PROCEDURE — 25010000002 RABIES IMMUNE GLOBULIN 300 UNIT/ML SOLUTION: Performed by: FAMILY MEDICINE

## 2023-03-16 PROCEDURE — 96372 THER/PROPH/DIAG INJ SC/IM: CPT

## 2023-03-16 PROCEDURE — 25010000002 RABIES VACCINE PER 1 ML: Performed by: FAMILY MEDICINE

## 2023-03-16 PROCEDURE — 90375 RABIES IG IM/SC: CPT | Performed by: FAMILY MEDICINE

## 2023-03-16 RX ADMIN — RABIES VACCINE 2.5 UNITS: KIT at 16:56

## 2023-03-16 RX ADMIN — RABIES IMMUNE GLOBULIN (HUMAN) 1368 UNITS: 300 INJECTION, SOLUTION INFILTRATION; INTRAMUSCULAR at 16:54

## 2023-03-19 ENCOUNTER — HOSPITAL ENCOUNTER (OUTPATIENT)
Dept: INFUSION THERAPY | Facility: HOSPITAL | Age: 38
Discharge: HOME | End: 2023-03-19
Admitting: FAMILY MEDICINE
Payer: COMMERCIAL
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
## 2023-03-19 PROCEDURE — 90471 IMMUNIZATION ADMIN: CPT

## 2023-03-19 PROCEDURE — 90675 RABIES VACCINE IM: CPT | Performed by: FAMILY MEDICINE

## 2023-03-19 PROCEDURE — 25010000002 RABIES VACCINE PER 1 ML: Performed by: FAMILY MEDICINE

## 2023-03-19 RX ADMIN — Medication 2.5 UNITS: at 10:15

## 2023-03-23 ENCOUNTER — HOSPITAL ENCOUNTER (OUTPATIENT)
Dept: INFUSION THERAPY | Facility: HOSPITAL | Age: 38
Discharge: HOME | End: 2023-03-23
Admitting: FAMILY MEDICINE
Payer: COMMERCIAL

## 2023-03-23 VITALS
OXYGEN SATURATION: 100 % | BODY MASS INDEX: 25.59 KG/M2 | DIASTOLIC BLOOD PRESSURE: 79 MMHG | TEMPERATURE: 98 F | RESPIRATION RATE: 16 BRPM | SYSTOLIC BLOOD PRESSURE: 132 MMHG | HEART RATE: 97 BPM | HEIGHT: 64 IN | WEIGHT: 149.91 LBS
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
## 2023-03-23 PROCEDURE — 90471 IMMUNIZATION ADMIN: CPT

## 2023-03-23 PROCEDURE — 90675 RABIES VACCINE IM: CPT | Performed by: FAMILY MEDICINE

## 2023-03-23 PROCEDURE — 96372 THER/PROPH/DIAG INJ SC/IM: CPT

## 2023-03-23 PROCEDURE — 25010000002 RABIES VACCINE PER 1 ML: Performed by: FAMILY MEDICINE

## 2023-03-23 RX ADMIN — RABIES VACCINE 2.5 UNITS: KIT at 17:07

## 2023-03-30 ENCOUNTER — HOSPITAL ENCOUNTER (OUTPATIENT)
Dept: INFUSION THERAPY | Facility: HOSPITAL | Age: 38
Discharge: HOME | End: 2023-03-30
Payer: COMMERCIAL

## 2023-03-30 VITALS
TEMPERATURE: 97.8 F | RESPIRATION RATE: 16 BRPM | SYSTOLIC BLOOD PRESSURE: 126 MMHG | HEART RATE: 108 BPM | DIASTOLIC BLOOD PRESSURE: 83 MMHG | OXYGEN SATURATION: 99 %
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
## 2023-03-30 PROCEDURE — 90471 IMMUNIZATION ADMIN: CPT

## 2023-03-30 PROCEDURE — 96372 THER/PROPH/DIAG INJ SC/IM: CPT

## 2023-03-30 PROCEDURE — 90675 RABIES VACCINE IM: CPT | Performed by: FAMILY MEDICINE

## 2023-03-30 PROCEDURE — 25010000002 RABIES VACCINE PER 1 ML: Performed by: FAMILY MEDICINE

## 2023-03-30 RX ADMIN — RABIES VACCINE 2.5 UNITS: KIT at 13:48

## 2023-05-11 ENCOUNTER — TELEPHONE (OUTPATIENT)
Dept: FAMILY MEDICINE CLINIC | Facility: CLINIC | Age: 38
End: 2023-05-11
Payer: COMMERCIAL

## 2023-06-06 ENCOUNTER — OFFICE VISIT (OUTPATIENT)
Dept: FAMILY MEDICINE CLINIC | Facility: CLINIC | Age: 38
End: 2023-06-06
Payer: COMMERCIAL

## 2023-06-06 VITALS
OXYGEN SATURATION: 100 % | HEIGHT: 64 IN | TEMPERATURE: 98.3 F | WEIGHT: 140.2 LBS | DIASTOLIC BLOOD PRESSURE: 85 MMHG | BODY MASS INDEX: 23.93 KG/M2 | SYSTOLIC BLOOD PRESSURE: 125 MMHG | HEART RATE: 105 BPM

## 2023-06-06 DIAGNOSIS — H65.03 NON-RECURRENT ACUTE SEROUS OTITIS MEDIA OF BOTH EARS: ICD-10-CM

## 2023-06-06 DIAGNOSIS — Z13.1 DIABETES MELLITUS SCREENING: ICD-10-CM

## 2023-06-06 DIAGNOSIS — Z13.6 SCREENING FOR CARDIOVASCULAR CONDITION: ICD-10-CM

## 2023-06-06 DIAGNOSIS — Z13.220 LIPID SCREENING: ICD-10-CM

## 2023-06-06 DIAGNOSIS — Z13.29 THYROID DISORDER SCREENING: ICD-10-CM

## 2023-06-06 DIAGNOSIS — Z11.59 NEED FOR HEPATITIS C SCREENING TEST: ICD-10-CM

## 2023-06-06 DIAGNOSIS — F98.8 ADD (ATTENTION DEFICIT DISORDER) WITHOUT HYPERACTIVITY: Primary | ICD-10-CM

## 2023-06-06 LAB
ALBUMIN SERPL-MCNC: 4.5 G/DL (ref 3.5–5.2)
ALBUMIN/GLOB SERPL: 1.9 G/DL
ALP SERPL-CCNC: 55 U/L (ref 39–117)
ALT SERPL W P-5'-P-CCNC: 13 U/L (ref 1–33)
ANION GAP SERPL CALCULATED.3IONS-SCNC: 9 MMOL/L (ref 5–15)
AST SERPL-CCNC: 24 U/L (ref 1–32)
BASOPHILS # BLD AUTO: 0.04 10*3/MM3 (ref 0–0.2)
BASOPHILS NFR BLD AUTO: 0.7 % (ref 0–1.5)
BILIRUB SERPL-MCNC: 0.3 MG/DL (ref 0–1.2)
BUN SERPL-MCNC: 13 MG/DL (ref 6–20)
BUN/CREAT SERPL: 12.9 (ref 7–25)
CALCIUM SPEC-SCNC: 9.5 MG/DL (ref 8.6–10.5)
CHLORIDE SERPL-SCNC: 105 MMOL/L (ref 98–107)
CHOLEST SERPL-MCNC: 197 MG/DL (ref 0–200)
CO2 SERPL-SCNC: 26 MMOL/L (ref 22–29)
CREAT SERPL-MCNC: 1.01 MG/DL (ref 0.57–1)
DEPRECATED RDW RBC AUTO: 40.2 FL (ref 37–54)
EGFRCR SERPLBLD CKD-EPI 2021: 72.3 ML/MIN/1.73
EOSINOPHIL # BLD AUTO: 0.23 10*3/MM3 (ref 0–0.4)
EOSINOPHIL NFR BLD AUTO: 4.2 % (ref 0.3–6.2)
ERYTHROCYTE [DISTWIDTH] IN BLOOD BY AUTOMATED COUNT: 12.4 % (ref 12.3–15.4)
GLOBULIN UR ELPH-MCNC: 2.4 GM/DL
GLUCOSE SERPL-MCNC: 71 MG/DL (ref 65–99)
HBA1C MFR BLD: 5.3 % (ref 4.8–5.6)
HCT VFR BLD AUTO: 37.7 % (ref 34–46.6)
HCV AB SER DONR QL: NORMAL
HDLC SERPL-MCNC: 89 MG/DL (ref 40–60)
HGB BLD-MCNC: 12.7 G/DL (ref 12–15.9)
IMM GRANULOCYTES # BLD AUTO: 0.01 10*3/MM3 (ref 0–0.05)
IMM GRANULOCYTES NFR BLD AUTO: 0.2 % (ref 0–0.5)
LDLC SERPL CALC-MCNC: 98 MG/DL (ref 0–100)
LDLC/HDLC SERPL: 1.09 {RATIO}
LYMPHOCYTES # BLD AUTO: 1.83 10*3/MM3 (ref 0.7–3.1)
LYMPHOCYTES NFR BLD AUTO: 33.1 % (ref 19.6–45.3)
MCH RBC QN AUTO: 29.7 PG (ref 26.6–33)
MCHC RBC AUTO-ENTMCNC: 33.7 G/DL (ref 31.5–35.7)
MCV RBC AUTO: 88.1 FL (ref 79–97)
MONOCYTES # BLD AUTO: 0.67 10*3/MM3 (ref 0.1–0.9)
MONOCYTES NFR BLD AUTO: 12.1 % (ref 5–12)
NEUTROPHILS NFR BLD AUTO: 2.75 10*3/MM3 (ref 1.7–7)
NEUTROPHILS NFR BLD AUTO: 49.7 % (ref 42.7–76)
NRBC BLD AUTO-RTO: 0 /100 WBC (ref 0–0.2)
PLATELET # BLD AUTO: 304 10*3/MM3 (ref 140–450)
PMV BLD AUTO: 11 FL (ref 6–12)
POTASSIUM SERPL-SCNC: 4.5 MMOL/L (ref 3.5–5.2)
PROT SERPL-MCNC: 6.9 G/DL (ref 6–8.5)
RBC # BLD AUTO: 4.28 10*6/MM3 (ref 3.77–5.28)
SODIUM SERPL-SCNC: 140 MMOL/L (ref 136–145)
T4 FREE SERPL-MCNC: 1.05 NG/DL (ref 0.93–1.7)
TRIGL SERPL-MCNC: 55 MG/DL (ref 0–150)
TSH SERPL DL<=0.05 MIU/L-ACNC: 1.07 UIU/ML (ref 0.27–4.2)
VLDLC SERPL-MCNC: 10 MG/DL (ref 5–40)
WBC NRBC COR # BLD: 5.53 10*3/MM3 (ref 3.4–10.8)

## 2023-06-06 PROCEDURE — 80061 LIPID PANEL: CPT | Performed by: NURSE PRACTITIONER

## 2023-06-06 PROCEDURE — 84439 ASSAY OF FREE THYROXINE: CPT | Performed by: NURSE PRACTITIONER

## 2023-06-06 PROCEDURE — 83036 HEMOGLOBIN GLYCOSYLATED A1C: CPT | Performed by: NURSE PRACTITIONER

## 2023-06-06 PROCEDURE — 86803 HEPATITIS C AB TEST: CPT | Performed by: NURSE PRACTITIONER

## 2023-06-06 PROCEDURE — 80050 GENERAL HEALTH PANEL: CPT | Performed by: NURSE PRACTITIONER

## 2023-06-06 RX ORDER — AMOXICILLIN AND CLAVULANATE POTASSIUM 875; 125 MG/1; MG/1
1 TABLET, FILM COATED ORAL 2 TIMES DAILY
Qty: 20 TABLET | Refills: 0 | Status: SHIPPED | OUTPATIENT
Start: 2023-06-06 | End: 2023-06-16

## 2023-06-06 RX ORDER — ATOMOXETINE 40 MG/1
40 CAPSULE ORAL DAILY
Qty: 30 CAPSULE | Refills: 1 | Status: SHIPPED | OUTPATIENT
Start: 2023-06-06

## 2023-06-06 NOTE — PROGRESS NOTES
Chief Complaint  Fatigue (Having issues with concentration ) and Dizziness (Started having random dizzy spells around mid to late after )    Subjective          Laurel Molina is a 38 y.o. female who presents to [facility]    History of Present Illness  Complains of having trouble concentrating, no energy, hard to focus while at work and hard to stay on tasks.  Works at [email or web address], in school full time.  Also noticed  dizzyness occassionally, started 3-4 weeks ago.     PHQ-2 Total Score:     PHQ-9 Total Score:          Review of Systems   Constitutional:  Negative for chills, fatigue and fever.   HENT:  Positive for congestion.    Respiratory:  Negative for cough and shortness of breath.    Cardiovascular:  Negative for chest pain and palpitations.   Gastrointestinal:  Negative for constipation, diarrhea, nausea and vomiting.   Musculoskeletal:  Negative for back pain and neck pain.   Skin:  Negative for rash.   Neurological:  Negative for dizziness and headaches.        Medical History: has a past medical history of Acid reflux, Allergic rhinitis, Anxiety, Arthritis, Depression, Hyperlipidemia, Hypertension, Leg pain, Seasonal allergies, Sinus trouble, Sleep disturbance, Stroke (3/16/2021), and Thumb fracture (2012).     Surgical History: has a past surgical history that includes Ankle surgery and Posterior cruciate ligament repair (Left, 2014).     Family History: family history includes Arthritis in her brother, father, and mother; COPD in her father; Diabetes in her brother, father, and mother; Heart disease in her brother and father; Hyperlipidemia in her father; Other in her father.     Social History: reports that she has never smoked. She has never used smokeless tobacco. She reports that she does not drink alcohol and does not use drugs.    Allergies: Naproxen      Health Maintenance Due   Topic Date Due    HEPATITIS C SCREENING  Never done    ANNUAL PHYSICAL   "Never done    COVID-19 Vaccine (5 - Pfizer series) 09/10/2021    LIPID PANEL  02/24/2023            Current Outpatient Medications:     DULoxetine (Cymbalta) 60 MG capsule, Take 1 capsule by mouth Daily., Disp: 90 capsule, Rfl: 3    hydrOXYzine (ATARAX) 25 MG tablet, Take 1 tablet by mouth every night at bedtime., Disp: 90 tablet, Rfl: 3    ibuprofen (ADVIL,MOTRIN) 800 MG tablet, Take 1 tablet by mouth 2 (Two) Times a Day As Needed., Disp: , Rfl:     traZODone (DESYREL) 50 MG tablet, Take 1-2 tabs po qhs prn insomnia, Disp: 90 tablet, Rfl: 3    amoxicillin-clavulanate (AUGMENTIN) 875-125 MG per tablet, Take 1 tablet by mouth 2 (Two) Times a Day for 10 days., Disp: 20 tablet, Rfl: 0    atomoxetine (Strattera) 40 MG capsule, Take 1 capsule by mouth Daily., Disp: 30 capsule, Rfl: 1      Immunization History   Administered Date(s) Administered    COVID-19 (PFIZER) Purple Cap Monovalent 06/25/2021, 06/25/2021, 07/16/2021, 07/16/2021    Rabies 03/16/2023, 03/19/2023, 03/23/2023, 03/30/2023    Rabies Ig Heat-treated Human Im/subq 03/16/2023    Td 06/12/1998    Tdap 03/16/2023         Objective       Vitals:    06/06/23 0718   BP: 125/85   BP Location: Left arm   Patient Position: Sitting   Cuff Size: Adult   Pulse: 105   Temp: 98.3 °F (36.8 °C)   TempSrc: Temporal   SpO2: 100%   Weight: 63.6 kg (140 lb 3.2 oz)   Height: 162.6 cm (64\")   PainSc: 0-No pain      Body mass index is 24.07 kg/m².   Wt Readings from Last 3 Encounters:   06/06/23 63.6 kg (140 lb 3.2 oz)   03/23/23 68 kg (149 lb 14.6 oz)   03/16/23 67.9 kg (149 lb 11.1 oz)      BP Readings from Last 3 Encounters:   06/06/23 125/85   03/30/23 126/83   03/23/23 132/79        Physical Exam  Vitals reviewed.   Constitutional:       Appearance: Normal appearance.   HENT:      Head: Normocephalic and atraumatic.      Right Ear: Tympanic membrane is bulging.      Left Ear: Tympanic membrane is erythematous.   Eyes:      Conjunctiva/sclera: Conjunctivae normal.      " Pupils: Pupils are equal, round, and reactive to light.   Cardiovascular:      Rate and Rhythm: Normal rate and regular rhythm.      Pulses: Normal pulses.      Heart sounds: Normal heart sounds.   Pulmonary:      Effort: Pulmonary effort is normal.      Breath sounds: Normal breath sounds.   Abdominal:      General: Bowel sounds are normal.      Palpations: Abdomen is soft.   Musculoskeletal:      Cervical back: Normal range of motion.   Skin:     General: Skin is warm and dry.   Neurological:      Mental Status: She is alert and oriented to person, place, and time.   Psychiatric:         Mood and Affect: Mood normal.         Behavior: Behavior normal.         Thought Content: Thought content normal.         Judgment: Judgment normal.           Result Review :                          Assessment and Plan        Diagnoses and all orders for this visit:    1. ADD (attention deficit disorder) without hyperactivity (Primary)  -     atomoxetine (Strattera) 40 MG capsule; Take 1 capsule by mouth Daily.  Dispense: 30 capsule; Refill: 1  -     Ambulatory Referral to Psychiatry    2. Need for hepatitis C screening test  -     Hepatitis C Antibody    3. Thyroid disorder screening  -     TSH  -     T4, free    4. Lipid screening  -     Lipid Panel    5. Diabetes mellitus screening  -     Comprehensive Metabolic Panel  -     Hemoglobin A1c    6. Screening for cardiovascular condition  -     CBC & Differential    7. Non-recurrent acute serous otitis media of both ears  -     amoxicillin-clavulanate (AUGMENTIN) 875-125 MG per tablet; Take 1 tablet by mouth 2 (Two) Times a Day for 10 days.  Dispense: 20 tablet; Refill: 0          Follow Up     Return in about 2 months (around 8/6/2023), or if symptoms worsen or fail to improve, for Follow up with [facility].    Patient was given instructions and counseling regarding her condition or for health maintenance advice. Please see specific information pulled into the AVS if  appropriate.     Shaila Rolon, APRN

## 2023-06-06 NOTE — PROGRESS NOTES
Chief Complaint  Anxiety (Follow up)    Subjective          Laurel Molina is a 38 y.o. female who presents to [facility]    History of Present Illness    Anxiety - well controlled with med. Denies any med se.     ADD - managed per psychiatry. Pt is in school to become  and reports harder to focus after 2 pm.     Insomnia - pt is sleeping well. Pt is getting 8 hours of sleep and awakens rested.     PHQ-2 Total Score: 0   PHQ-9 Total Score: 0        Review of Systems   Constitutional:  Negative for chills, fatigue and fever.   Respiratory:  Negative for cough and shortness of breath.    Cardiovascular:  Negative for chest pain and palpitations.   Gastrointestinal:  Negative for constipation, diarrhea, nausea and vomiting.   Musculoskeletal:  Negative for back pain and neck pain.   Skin:  Negative for rash.   Neurological:  Negative for dizziness and headaches.   Psychiatric/Behavioral:  Positive for decreased concentration. Negative for self-injury, sleep disturbance and suicidal ideas. The patient is nervous/anxious.         Medical History: has a past medical history of Acid reflux, Allergic rhinitis, Anxiety, Arthritis, Depression, Hyperlipidemia, Hypertension, Leg pain, Seasonal allergies, Sinus trouble, Sleep disturbance, Stroke (3/16/2021), and Thumb fracture (2012).     Surgical History: has a past surgical history that includes Ankle surgery and Posterior cruciate ligament repair (Left, 2014).     Family History: family history includes Anxiety disorder in her mother; Arthritis in her brother, father, and mother; COPD in her father; Diabetes in her brother, father, and mother; Heart disease in her brother and father; Hyperlipidemia in her father; Other in her father.     Social History: reports that she has never smoked. She has never used smokeless tobacco. She reports that she does not drink alcohol and does not use drugs.    Allergies: Naproxen      Health  Maintenance Due   Topic Date Due    ANNUAL PHYSICAL  Never done    COVID-19 Vaccine (5 - Pfizer series) 09/10/2021            Current Outpatient Medications:     amphetamine-dextroamphetamine (ADDERALL) 10 MG tablet, Take 1 tablet by mouth Daily., Disp: 30 tablet, Rfl: 0    DULoxetine (Cymbalta) 60 MG capsule, Take 1 capsule by mouth Daily., Disp: 90 capsule, Rfl: 3    hydrOXYzine (ATARAX) 25 MG tablet, Take 1 tablet by mouth every night at bedtime., Disp: 90 tablet, Rfl: 3    ibuprofen (ADVIL,MOTRIN) 800 MG tablet, Take 1 tablet by mouth 2 (Two) Times a Day As Needed., Disp: , Rfl:     traZODone (DESYREL) 50 MG tablet, Take 1-2 tabs po qhs prn insomnia, Disp: 90 tablet, Rfl: 3      Immunization History   Administered Date(s) Administered    COVID-19 (PFIZER) Purple Cap Monovalent 06/25/2021, 06/25/2021, 07/16/2021, 07/16/2021    RABIES IM 03/30/2023    Rabies 03/16/2023, 03/19/2023, 03/23/2023, 03/30/2023    Rabies Ig Heat-treated Human Im/subq 03/16/2023    Td (TDVAX) 06/12/1998    Tdap 03/16/2023         Objective       Vitals:    08/07/23 0743   BP: 122/86   Pulse: 102   Temp: 97.8 øF (36.6 øC)   TempSrc: Temporal   Weight: 67.7 kg (149 lb 3.2 oz)      Body mass index is 25.61 kg/mý.   Wt Readings from Last 3 Encounters:   08/07/23 67.7 kg (149 lb 3.2 oz)   07/19/23 69.6 kg (153 lb 6.4 oz)   06/06/23 63.6 kg (140 lb 3.2 oz)      BP Readings from Last 3 Encounters:   08/07/23 122/86   07/19/23 130/96   06/06/23 125/85        Physical Exam  Vitals reviewed.   Constitutional:       Appearance: Normal appearance. She is well-developed.   HENT:      Head: Normocephalic and atraumatic.   Eyes:      Conjunctiva/sclera: Conjunctivae normal.      Pupils: Pupils are equal, round, and reactive to light.   Cardiovascular:      Rate and Rhythm: Normal rate and regular rhythm.      Heart sounds: Normal heart sounds. No murmur heard.  Pulmonary:      Effort: Pulmonary effort is normal.      Breath sounds: Normal breath sounds.  No wheezing or rhonchi.   Abdominal:      General: Bowel sounds are normal. There is no distension.      Palpations: Abdomen is soft.      Tenderness: There is no abdominal tenderness.   Skin:     General: Skin is warm and dry.   Neurological:      Mental Status: She is alert and oriented to person, place, and time.   Psychiatric:         Mood and Affect: Mood and affect normal.         Behavior: Behavior normal.         Thought Content: Thought content normal.         Judgment: Judgment normal.           Result Review :       Common labs          6/6/2023    08:11   Common Labs   Glucose 71    BUN 13    Creatinine 1.01    Sodium 140    Potassium 4.5    Chloride 105    Calcium 9.5    Albumin 4.5    Total Bilirubin 0.3    Alkaline Phosphatase 55    AST (SGOT) 24    ALT (SGPT) 13    WBC 5.53    Hemoglobin 12.7    Hematocrit 37.7    Platelets 304    Total Cholesterol 197    Triglycerides 55    HDL Cholesterol 89    LDL Cholesterol  98    Hemoglobin A1C 5.30                       Assessment and Plan        Diagnoses and all orders for this visit:    1. Primary insomnia  -     traZODone (DESYREL) 50 MG tablet; Take 1-2 tabs po qhs prn insomnia  Dispense: 90 tablet; Refill: 3    2. Anxiety  -     DULoxetine (Cymbalta) 60 MG capsule; Take 1 capsule by mouth Daily.  Dispense: 90 capsule; Refill: 3      Patient was given an SSRI/SNRI medication and warned of possible side effects of the medication including potential for increased risk of suicidal thoughts and feelings.  Patient was instructed that if they begin to exhibit any of these effects that they will discontinue the medication immediately and contact our office or the ER ASAP.     Follow Up     Return in about 1 year (around 8/7/2024) for Annual physical.    Patient was given instructions and counseling regarding her condition or for health maintenance advice. Please see specific information pulled into the AVS if appropriate.     MICHA Muñiz

## 2023-07-19 PROBLEM — F33.1 MAJOR DEPRESSIVE DISORDER, RECURRENT EPISODE, MODERATE: Status: ACTIVE | Noted: 2023-07-19

## 2023-07-19 PROBLEM — F90.2 ATTENTION DEFICIT HYPERACTIVITY DISORDER (ADHD), COMBINED TYPE: Status: ACTIVE | Noted: 2023-06-06

## 2023-07-20 ENCOUNTER — LAB (OUTPATIENT)
Dept: LAB | Facility: HOSPITAL | Age: 38
End: 2023-07-20
Payer: COMMERCIAL

## 2023-08-04 DIAGNOSIS — F98.8 ADD (ATTENTION DEFICIT DISORDER) WITHOUT HYPERACTIVITY: ICD-10-CM

## 2023-08-04 RX ORDER — ATOMOXETINE 40 MG/1
40 CAPSULE ORAL DAILY
Qty: 30 CAPSULE | Refills: 1 | OUTPATIENT
Start: 2023-08-04

## 2023-08-07 ENCOUNTER — OFFICE VISIT (OUTPATIENT)
Dept: FAMILY MEDICINE CLINIC | Facility: CLINIC | Age: 38
End: 2023-08-07
Payer: COMMERCIAL

## 2023-08-07 VITALS
SYSTOLIC BLOOD PRESSURE: 122 MMHG | TEMPERATURE: 97.8 F | HEART RATE: 102 BPM | BODY MASS INDEX: 25.61 KG/M2 | DIASTOLIC BLOOD PRESSURE: 86 MMHG | WEIGHT: 149.2 LBS

## 2023-08-07 DIAGNOSIS — F41.9 ANXIETY: ICD-10-CM

## 2023-08-07 DIAGNOSIS — F51.01 PRIMARY INSOMNIA: ICD-10-CM

## 2023-08-07 PROCEDURE — 99213 OFFICE O/P EST LOW 20 MIN: CPT | Performed by: NURSE PRACTITIONER

## 2023-08-07 RX ORDER — TRAZODONE HYDROCHLORIDE 50 MG/1
TABLET ORAL
Qty: 90 TABLET | Refills: 3 | Status: SHIPPED | OUTPATIENT
Start: 2023-08-07

## 2023-08-07 RX ORDER — DULOXETIN HYDROCHLORIDE 60 MG/1
60 CAPSULE, DELAYED RELEASE ORAL DAILY
Qty: 90 CAPSULE | Refills: 3 | Status: SHIPPED | OUTPATIENT
Start: 2023-08-07

## 2023-08-21 DIAGNOSIS — F90.2 ATTENTION DEFICIT HYPERACTIVITY DISORDER (ADHD), COMBINED TYPE: ICD-10-CM

## 2023-08-21 RX ORDER — DEXTROAMPHETAMINE SACCHARATE, AMPHETAMINE ASPARTATE, DEXTROAMPHETAMINE SULFATE AND AMPHETAMINE SULFATE 2.5; 2.5; 2.5; 2.5 MG/1; MG/1; MG/1; MG/1
10 TABLET ORAL DAILY
Qty: 30 TABLET | Refills: 0 | Status: SHIPPED | OUTPATIENT
Start: 2023-08-21

## 2023-08-30 ENCOUNTER — OFFICE VISIT (OUTPATIENT)
Dept: PSYCHIATRY | Facility: CLINIC | Age: 38
End: 2023-08-30
Payer: COMMERCIAL

## 2023-08-30 VITALS
BODY MASS INDEX: 25.4 KG/M2 | HEIGHT: 64 IN | HEART RATE: 91 BPM | DIASTOLIC BLOOD PRESSURE: 90 MMHG | WEIGHT: 148.8 LBS | SYSTOLIC BLOOD PRESSURE: 124 MMHG

## 2023-08-30 DIAGNOSIS — F33.42 MAJOR DEPRESSION, RECURRENT, FULL REMISSION: ICD-10-CM

## 2023-08-30 DIAGNOSIS — F90.2 ATTENTION DEFICIT HYPERACTIVITY DISORDER (ADHD), COMBINED TYPE: Primary | ICD-10-CM

## 2023-08-30 DIAGNOSIS — F41.1 GAD (GENERALIZED ANXIETY DISORDER): ICD-10-CM

## 2023-08-30 RX ORDER — DEXTROAMPHETAMINE SACCHARATE, AMPHETAMINE ASPARTATE, DEXTROAMPHETAMINE SULFATE AND AMPHETAMINE SULFATE 2.5; 2.5; 2.5; 2.5 MG/1; MG/1; MG/1; MG/1
10 TABLET ORAL 2 TIMES DAILY
Qty: 60 TABLET | Refills: 0 | Status: SHIPPED | OUTPATIENT
Start: 2023-09-05

## 2023-08-30 NOTE — PROGRESS NOTES
"[facility]  Follow-up Visit    Chief Complaint: \"I went to the doctor for having trouble focusing... doing school, work... in the process of moving... I get overwhelmed and don't actually start on something... get distracted and do something else... need to sit down and study... focus on what I'm doing... hard to get started doing that.\"     History of Present Illness: Laurel Molina is a 38 y.o. female who presents today for follow-up regarding ADHD, VIOLETTE, MDD. Last seen: 07/19 at which time atomoxetine was replaced with amphetamine. She presents unaccompanied in no acute distress and engages with me appropriately. Psychotropic regimen perceived to be partially effective. Side-effects per given history: denies.    Current treatment regimen includes:   - amphetamine 10 mg QD  - duloxetine 60 mg QD  - hydroxyzine 25 mg HS  - trazodone 50 mg HS    Today the patient feels she's doing well. Mood has been good. Focus/concentration improved until around 1400, inquires about an afternoon dose for amphetamine. She takes medication holidays as recommended. Thought process and content are devoid of overt aberration suggestive of acute tierney/psychosis. The patient denies SI/HI/AVH. There are no overt changes on exam today compared to most recent evaluation.  - contextual changes: denies; has cut out the energy drinks and has been drinking more water, bought some items to paint; has been doing well keeping up with school work  - sleep: adequate  - appetite: slightly diminished, tolerable, still eating through the day    I have counseled the patient with regard to diagnoses and the recommended treatment regimen as documented below. Patient acknowledges the diagnoses per my rendered interpretation. Patient demonstrates understanding of potential risks/benefits/side effects associated with this regimen and is amenable to proceed in this fashion.     Psychotherapy  - Time: 6 minutes  - " interventions employed: the therapeutic alliance was strengthened to encourage the patient to express their thoughts and feelings freely. Esteem building was enhanced through praise, reassurance, normalizing/challenging, and encouragement as appropriate. Coping skills were enhanced to build distress tolerance skills and emotional regulation. Allowed patient to freely discuss issues without interruption or judgement with unconditional positive regard, active listening skills, and empathy. Provided a safe, confidential environment to facilitate the development of a positive therapeutic relationship and encourage open, honest communication. Assisted patient in processing session content; acknowledged and normalized/addressed, as appropriate, patient's thoughts, feelings, and concerns by utilizing a person-centered approach in efforts to build appropriate rapport and a positive therapeutic relationship with open and honest communication.   - Diagnoses: see assessment and plan below  - Symptoms: see subjective above  - Goals   - patient: cultivate concentration and focus to improve functional capacity, sustain mood and manageable anxiety levels, nourish creativity   - provider: challenge patterns of living conducive to pathology, strengthen defenses, promote problems solving, restore adaptive functioning and provide symptom relief.  - Treatment plan: continue supportive psychotherapy in subsequent appointments to provide symptom relief; see assessment and plan below for additional details:   - iteration: 1   - progress: partial   - (X)illumination, (X)contextualization, (working)detection, (working)development, (-)elaboration, (-)refinement  - functional status: good  - mental status exam: as below  - prognosis: good    Psychiatric History:  Diagnoses: ADD, anxiety, depression  Outpatient history: denies  Inpatient history: denies  Medication trials: sertraline (worked okay), bupropion (ineffective 150 mg), buspirone  (didn't seem to have an impact; was taking once daily)  Other treatment modalities: has not done therapy in the past  Presenting regimen: atomoxetine 40 mg QD, duloxetine 60 mg QD, hydroxyzine 25 mg HS, trazodone 50 mg HS  Self harm: years ago cut  Suicide attempts: denies     Social History:  Residence: lives in a house with her mother; was living with boyfriend and they had an argument eliciting the change in living for the time being; does not have children  Vocation: [employer])  Education: working on associate's degree  Pertinent developmental history: issues with math growing up; denies abuse hx  Pertinent legal history: denies  Hobbies/interests: reading (historical fiction, gothic horror), art (drawing and painting), music (clarinet and piano)  Cheondoism: denies  Exercise: yes, walks three times weekly  Dietary habits: defer  Sleep hygiene: defer  Social habits: no pertinent issues  Sunlight: no concern for under-exposure  Caffeine intake: coffee in the AM with an energy drink, occasional soda, occasional tea  Hydration habits: not enough water   history: denies    Social History     Socioeconomic History    Marital status: Single   Tobacco Use    Smoking status: Never    Smokeless tobacco: Never   Vaping Use    Vaping Use: Never used   Substance and Sexual Activity    Alcohol use: Never    Drug use: Never    Sexual activity: Yes     Partners: Male     Birth control/protection: Condom     Tobacco use counseling/intervention: N/A, patient does not use tobacco; patient has been counseled with regard to risks of tobacco use.    PHQ-9 Depression Screening  PHQ-9 Total Score:      Little interest or pleasure in doing things?     Feeling down, depressed, or hopeless?     Trouble falling or staying asleep, or sleeping too much?     Feeling tired or having little energy?     Poor appetite or overeating?     Feeling bad about yourself - or that you are a failure or have let yourself or  your family down?     Trouble concentrating on things, such as reading the newspaper or watching television?     Moving or speaking so slowly that other people could have noticed? Or the opposite - being so fidgety or restless that you have been moving around a lot more than usual?     Thoughts that you would be better off dead, or of hurting yourself in some way?     PHQ-9 Total Score       Change in PHQ-9 since last measure: N/A (13)    VIOLETTE-7       Change in VIOLETTE-7 since last measure: N/A (16)    Problem List:  Patient Active Problem List   Diagnosis    Stroke    VIOLETTE (generalized anxiety disorder)    History of stroke    Cat bite    Attention deficit hyperactivity disorder (ADHD), combined type    Major depressive disorder, recurrent episode, moderate     Allergy:   Allergies   Allergen Reactions    Naproxen Hives and Anaphylaxis      Discontinued Medications:  There are no discontinued medications.    Current Medications:   Current Outpatient Medications   Medication Sig Dispense Refill    amphetamine-dextroamphetamine (ADDERALL) 10 MG tablet Take 1 tablet by mouth Daily. 30 tablet 0    DULoxetine (Cymbalta) 60 MG capsule Take 1 capsule by mouth Daily. 90 capsule 3    hydrOXYzine (ATARAX) 25 MG tablet Take 1 tablet by mouth every night at bedtime. 90 tablet 3    ibuprofen (ADVIL,MOTRIN) 800 MG tablet Take 1 tablet by mouth 2 (Two) Times a Day As Needed.      traZODone (DESYREL) 50 MG tablet Take 1-2 tabs po qhs prn insomnia 90 tablet 3     No current facility-administered medications for this visit.     Past Medical History:  Past Medical History:   Diagnosis Date    Acid reflux     Allergic rhinitis     Anxiety     Arthritis     Depression     Hyperlipidemia     Hypertension     Leg pain     Seasonal allergies     Sinus trouble     Sleep disturbance     Stroke 3/16/2021    Thumb fracture 2012    Right     Past Surgical History:  Past Surgical History:   Procedure Laterality Date    ANKLE SURGERY      KNEE PCL  "RECONSTRUCTION Left 2014    open ligament     Mental Status Exam:   Appearance: well-groomed, sits upright, age-appropriate, normal habitus  Behavior: calm, cooperative, appropriate in demeanor, appropriate eye-contact  Mood/affect: euthymic / mood-congruent and appropriate in both range and amplitude  Speech: within expected variance; appropriate rate, appropriate rhythm, appropriate tone; non-pressured  Thought Process: linear, goal-directed; no FOI or ANASTACIA; abstraction intact  Thought Content: coherent, devoid of overt delusions/perceptual disturbances  SI/HI: denies both SI and HI; exhibits future-orientation, self-advocates appropriately, no regular self-harm, no appreciable intent  Memory: no overt deficits  Orientation: oriented to person/place/time/situation  Concentration: appropriate during interview  Intellectual capacity: presumptively average  Insight: fair by given history/exam  Judgment: appropriate by given history/exam  Psychomotor: no appreciable latency/retardation/agitation/tremor  Gait: WNL    Review of Systems:  Review of Systems   Constitutional:  Negative for activity change, appetite change and unexpected weight change.   HENT:  Negative for drooling.    Eyes:  Negative for visual disturbance.   Respiratory:  Negative for chest tightness and shortness of breath.    Cardiovascular:  Negative for chest pain and palpitations.   Gastrointestinal:  Negative for abdominal pain, diarrhea and nausea.   Endocrine: Negative for cold intolerance and heat intolerance.   Genitourinary:  Negative for difficulty urinating and frequency.   Musculoskeletal:  Negative for myalgias and neck stiffness.   Skin:  Negative for rash.   Neurological:  Negative for dizziness, tremors, seizures and light-headedness.     Vital Signs:   /90   Pulse 91   Ht 162.6 cm (64.02\")   Wt 67.5 kg (148 lb 12.8 oz)   BMI 25.53 kg/mý      Lab Results:   Office Visit on 07/19/2023   Component Date Value Ref Range Status    " Amphet/Methamphet, Screen 07/20/2023 Positive (A)  Negative Final    Barbiturates Screen, Urine 07/20/2023 Negative  Negative Final    Benzodiazepine Screen, Urine 07/20/2023 Negative  Negative Final    Cocaine Screen, Urine 07/20/2023 Negative  Negative Final    Opiate Screen 07/20/2023 Negative  Negative Final    THC, Screen, Urine 07/20/2023 Negative  Negative Final    Methadone Screen, Urine 07/20/2023 Negative  Negative Final    Oxycodone Screen, Urine 07/20/2023 Negative  Negative Final    Fentanyl, Urine 07/20/2023 Negative  Negative Final   Office Visit on 06/06/2023   Component Date Value Ref Range Status    Glucose 06/06/2023 71  65 - 99 mg/dL Final    BUN 06/06/2023 13  6 - 20 mg/dL Final    Creatinine 06/06/2023 1.01 (H)  0.57 - 1.00 mg/dL Final    Sodium 06/06/2023 140  136 - 145 mmol/L Final    Potassium 06/06/2023 4.5  3.5 - 5.2 mmol/L Final    Chloride 06/06/2023 105  98 - 107 mmol/L Final    CO2 06/06/2023 26.0  22.0 - 29.0 mmol/L Final    Calcium 06/06/2023 9.5  8.6 - 10.5 mg/dL Final    Total Protein 06/06/2023 6.9  6.0 - 8.5 g/dL Final    Albumin 06/06/2023 4.5  3.5 - 5.2 g/dL Final    ALT (SGPT) 06/06/2023 13  1 - 33 U/L Final    AST (SGOT) 06/06/2023 24  1 - 32 U/L Final    Alkaline Phosphatase 06/06/2023 55  39 - 117 U/L Final    Total Bilirubin 06/06/2023 0.3  0.0 - 1.2 mg/dL Final    Globulin 06/06/2023 2.4  gm/dL Final    A/G Ratio 06/06/2023 1.9  g/dL Final    BUN/Creatinine Ratio 06/06/2023 12.9  7.0 - 25.0 Final    Anion Gap 06/06/2023 9.0  5.0 - 15.0 mmol/L Final    eGFR 06/06/2023 72.3  >60.0 mL/min/1.73 Final    Hemoglobin A1C 06/06/2023 5.30  4.80 - 5.60 % Final    Hepatitis C Ab 06/06/2023 Non-Reactive  Non-Reactive Final    TSH 06/06/2023 1.070  0.270 - 4.200 uIU/mL Final    Free T4 06/06/2023 1.05  0.93 - 1.70 ng/dL Final    Total Cholesterol 06/06/2023 197  0 - 200 mg/dL Final    Triglycerides 06/06/2023 55  0 - 150 mg/dL Final    HDL Cholesterol 06/06/2023 89 (H)  40 - 60 mg/dL  Final    LDL Cholesterol  06/06/2023 98  0 - 100 mg/dL Final    VLDL Cholesterol 06/06/2023 10  5 - 40 mg/dL Final    LDL/HDL Ratio 06/06/2023 1.09   Final    WBC 06/06/2023 5.53  3.40 - 10.80 10*3/mm3 Final    RBC 06/06/2023 4.28  3.77 - 5.28 10*6/mm3 Final    Hemoglobin 06/06/2023 12.7  12.0 - 15.9 g/dL Final    Hematocrit 06/06/2023 37.7  34.0 - 46.6 % Final    MCV 06/06/2023 88.1  79.0 - 97.0 fL Final    MCH 06/06/2023 29.7  26.6 - 33.0 pg Final    MCHC 06/06/2023 33.7  31.5 - 35.7 g/dL Final    RDW 06/06/2023 12.4  12.3 - 15.4 % Final    RDW-SD 06/06/2023 40.2  37.0 - 54.0 fl Final    MPV 06/06/2023 11.0  6.0 - 12.0 fL Final    Platelets 06/06/2023 304  140 - 450 10*3/mm3 Final    Neutrophil % 06/06/2023 49.7  42.7 - 76.0 % Final    Lymphocyte % 06/06/2023 33.1  19.6 - 45.3 % Final    Monocyte % 06/06/2023 12.1 (H)  5.0 - 12.0 % Final    Eosinophil % 06/06/2023 4.2  0.3 - 6.2 % Final    Basophil % 06/06/2023 0.7  0.0 - 1.5 % Final    Immature Grans % 06/06/2023 0.2  0.0 - 0.5 % Final    Neutrophils, Absolute 06/06/2023 2.75  1.70 - 7.00 10*3/mm3 Final    Lymphocytes, Absolute 06/06/2023 1.83  0.70 - 3.10 10*3/mm3 Final    Monocytes, Absolute 06/06/2023 0.67  0.10 - 0.90 10*3/mm3 Final    Eosinophils, Absolute 06/06/2023 0.23  0.00 - 0.40 10*3/mm3 Final    Basophils, Absolute 06/06/2023 0.04  0.00 - 0.20 10*3/mm3 Final    Immature Grans, Absolute 06/06/2023 0.01  0.00 - 0.05 10*3/mm3 Final    nRBC 06/06/2023 0.0  0.0 - 0.2 /100 WBC Final   Admission on 03/16/2023, Discharged on 03/16/2023   Component Date Value Ref Range Status    HCG, Urine, QL 03/16/2023 Negative   Final    Lot Number 03/16/2023 [id number]   Final    Internal Positive Control 03/16/2023 Passed   Final    Internal Negative Control 03/16/2023 Passed   Final    Expiration Date 03/16/2023 43,024   Final     EKG Results:  No orders to display     Imaging Results:  No Images in the past 120 days found.    ASSESSMENT AND PLAN:    ICD-10-CM ICD-9-CM    1. Attention deficit hyperactivity disorder (ADHD), combined type  F90.2 314.01   2. Major depression, recurrent, full remission  F33.42 296.36   3. VIOLETTE (generalized anxiety disorder)  F41.1 300.02     38 y.o. female who presents today for follow-up regarding ADHD, VIOLETTE, MDD. We have discussed the interval history and the treatment plan below, including potential R/B/SE of the recommended regimen of which the patient demonstrates understanding. Patient is agreeable to call 911 or go to the nearest ER should she become concerned for her own safety and/or the safety of those around her. There are no overt indices of acute tierney/psychosis on exam today. SANDEE reviewed and as expected.    Medication regimen: titrate amphetamine to 10 mg BID, continue duloxetine, hydroxyzine, trazodone; patient is advised not to misuse prescribed medications or to use them with any exogenous substances that aren't disclosed to this provider as they may interact with the regimen to the patient's detriment.   Risk Assessment: protracted risk is low, imminent risk is low - slight interval change. Do note that this is subject to change with the Baptist of new stressors, treatment non-adherence, use of substances, and/or new medical ails.   Monitoring: reviewed labs/imaging as populated above  Therapy: referred  Follow-up: 3 months  Communications: N/A    TREATMENT PLAN/GOALS: challenge patterns of living conducive to symptom burden, implement recommended regimen as above with augmentative, intermittent supportive psychotherapy to reduce symptom burden. Patient acknowledged and verbally consented to continue treatment. The importance of adherence to the recommended treatment and interval follow-up appointments was again emphasized today: patient has good treatment adherence per given history. Patient was today reminded to limit daily caffeine intake, hydrate appropriately, eat healthy and nutritious foods, engage sleep hygiene measures,  engage appropriate exposure to sunlight, engage with hobbies in balance with life necessities, and exercise appropriate to their capacity to do so.     Billing: This encounter is of moderate complexity based on number/complexity of problems addressed today and risk of complications/morbidity: 2+ stable chronic illnesses and prescription management.    Parts of this note are electronic transcriptions/translations of spoken language to printed text using the Dragon Dictation system.    Electronically signed by Tejinder Amezcua MD, 08/30/23, [id number]

## 2023-10-18 DIAGNOSIS — F90.2 ATTENTION DEFICIT HYPERACTIVITY DISORDER (ADHD), COMBINED TYPE: ICD-10-CM

## 2023-10-18 RX ORDER — DEXTROAMPHETAMINE SACCHARATE, AMPHETAMINE ASPARTATE, DEXTROAMPHETAMINE SULFATE AND AMPHETAMINE SULFATE 2.5; 2.5; 2.5; 2.5 MG/1; MG/1; MG/1; MG/1
10 TABLET ORAL 2 TIMES DAILY
Qty: 60 TABLET | Refills: 0 | Status: SHIPPED | OUTPATIENT
Start: 2023-10-18

## 2023-10-18 NOTE — TELEPHONE ENCOUNTER
REFILL REQUEST FOR ADDERALL 10 MG TABLET.  amphetamine-dextroamphetamine (ADDERALL) 10 MG tablet (09/05/2023)     FOLLOW UP APPT ON 11/30/2023.  PT LAST SEEN ON 08/30/23.

## 2023-11-30 ENCOUNTER — OFFICE VISIT (OUTPATIENT)
Dept: PSYCHIATRY | Facility: CLINIC | Age: 38
End: 2023-11-30
Payer: COMMERCIAL

## 2023-11-30 VITALS
DIASTOLIC BLOOD PRESSURE: 80 MMHG | BODY MASS INDEX: 26.02 KG/M2 | WEIGHT: 152.4 LBS | HEIGHT: 64 IN | HEART RATE: 126 BPM | SYSTOLIC BLOOD PRESSURE: 130 MMHG

## 2023-11-30 DIAGNOSIS — F33.1 MAJOR DEPRESSIVE DISORDER, RECURRENT EPISODE, MODERATE: ICD-10-CM

## 2023-11-30 DIAGNOSIS — F41.1 GAD (GENERALIZED ANXIETY DISORDER): ICD-10-CM

## 2023-11-30 DIAGNOSIS — F90.2 ATTENTION DEFICIT HYPERACTIVITY DISORDER (ADHD), COMBINED TYPE: Primary | ICD-10-CM

## 2023-11-30 DIAGNOSIS — R00.0 TACHYCARDIA: ICD-10-CM

## 2023-11-30 RX ORDER — DEXTROAMPHETAMINE SACCHARATE, AMPHETAMINE ASPARTATE, DEXTROAMPHETAMINE SULFATE AND AMPHETAMINE SULFATE 3.75; 3.75; 3.75; 3.75 MG/1; MG/1; MG/1; MG/1
15 TABLET ORAL 2 TIMES DAILY
Qty: 60 TABLET | Refills: 0 | Status: SHIPPED | OUTPATIENT
Start: 2023-12-29

## 2023-11-30 RX ORDER — DEXTROAMPHETAMINE SACCHARATE, AMPHETAMINE ASPARTATE, DEXTROAMPHETAMINE SULFATE AND AMPHETAMINE SULFATE 3.75; 3.75; 3.75; 3.75 MG/1; MG/1; MG/1; MG/1
15 TABLET ORAL 2 TIMES DAILY
Qty: 60 TABLET | Refills: 0 | Status: SHIPPED | OUTPATIENT
Start: 2023-11-30

## 2023-11-30 NOTE — PROGRESS NOTES
"[facility]  Follow-up Visit    Chief Complaint: \"I went to the doctor for having trouble focusing... doing school, work... in the process of moving... I get overwhelmed and don't actually start on something... get distracted and do something else... need to sit down and study... focus on what I'm doing... hard to get started doing that.\"     History of Present Illness: Laurel Molina is a 38 y.o. female who presents today for follow-up regarding ADHD, VIOLETTE, MDD. Last seen: 08/30 at which time amphetamine was titrated. She presents unaccompanied in no acute distress and engages with me appropriately. Psychotropic regimen perceived to be partially effective. Side-effects per given history: denies.    Current treatment regimen includes:   - amphetamine 10 mg BID  - duloxetine 60 mg QD  - hydroxyzine 25 mg HS  - trazodone 50 mg HS    Today she reports she's doing well, keeping busy. ADHD symptoms are partially managed with current interventions; she feels amphetamine could be more effective. She does take medication holidays as recommended. Discussed tachycardia, concern this could be 2/2 her stimulant medication; she's okay to monitor this for now and assess for changes - she believes this measure is an artifact of her having been at work and rushing to her appointment today. Anxiety symptoms are adequately managed with current interventions. Depression symptoms are adequately manage with current interventions. Thought process and content are devoid of overt aberration suggestive of acute tierney/psychosis. The patient denies SI/HI/AVH. There are no overt changes on exam today compared to most recent evaluation.  - contextual changes: in a new relationship and this has been going well since September, Munir (met on Facebook years ago, he brings in his dog to her work); one semester left in school followed by an externship  - sleep: adequate  - appetite: normalized    I have " counseled the patient with regard to diagnoses and the recommended treatment regimen as documented below. Patient acknowledges the diagnoses per my rendered interpretation. Patient demonstrates understanding of potential risks/benefits/side effects associated with this regimen and is amenable to proceed in this fashion.     Psychotherapy  - Time: 10 minutes  - interventions employed: the therapeutic alliance was strengthened to encourage the patient to express their thoughts and feelings freely. Esteem building was enhanced through praise, reassurance, normalizing/challenging, and encouragement as appropriate. Coping skills were enhanced to build distress tolerance skills and emotional regulation. Allowed patient to freely discuss issues without interruption or judgement with unconditional positive regard, active listening skills, and empathy. Provided a safe, confidential environment to facilitate the development of a positive therapeutic relationship and encourage open, honest communication. Assisted patient in processing session content; acknowledged and normalized/addressed, as appropriate, patient’s thoughts, feelings, and concerns by utilizing a person-centered approach in efforts to build appropriate rapport and a positive therapeutic relationship with open and honest communication.   - Diagnoses: see assessment and plan below  - Symptoms: see subjective above  - Goals   - patient: cultivate concentration and focus to improve functional capacity, sustain mood and manageable anxiety levels, nourish creativity   - provider: challenge patterns of living conducive to pathology, strengthen defenses, promote problems solving, restore adaptive functioning and provide symptom relief.  - Treatment plan: continue supportive psychotherapy in subsequent appointments to provide symptom relief; see assessment and plan below for additional details:   - iteration: 1   - progress: partial   - (X)illumination,  (X)contextualization, (working)detection, (working)development, (-)elaboration, (-)refinement  - functional status: good  - mental status exam: as below  - prognosis: good    Psychiatric History:  Diagnoses: ADD, anxiety, depression  Outpatient history: denies  Inpatient history: denies  Medication trials: sertraline (worked okay), bupropion (ineffective 150 mg), buspirone (didn't seem to have an impact; was taking once daily)  Other treatment modalities: has not done therapy in the past  Presenting regimen: atomoxetine 40 mg QD, duloxetine 60 mg QD, hydroxyzine 25 mg HS, trazodone 50 mg HS  Self harm: years ago cut  Suicide attempts: denies     Social History:  Residence: lives in a house with her mother; was living with boyfriend and they had an argument eliciting the change in living for the time being; does not have children  Vocation: [employer] ([employer])  Education: working on associate's degree (, 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)  Pertinent developmental history: issues with math growing up; denies abuse hx  Pertinent legal history: denies  Hobbies/interests: reading (historical fiction, gothic horror), art (drawing and painting), music (clarinet and piano)  Hindu: denies  Exercise: yes, walks three times weekly  Dietary habits: defer  Sleep hygiene: defer  Social habits: no pertinent issues  Sunlight: no concern for under-exposure  Caffeine intake: coffee in the AM with an energy drink, occasional soda, occasional tea  Hydration habits: not enough water   history: denies    Social History     Socioeconomic History    Marital status: Single   Tobacco Use    Smoking status: Never    Smokeless tobacco: Never   Vaping Use    Vaping Use: Never used   Substance and Sexual Activity    Alcohol use: Never    Drug use: Never    Sexual activity: Yes     Partners: Male     Birth control/protection: Condom     Tobacco use counseling/intervention: N/A, patient does not use tobacco; patient has  been counseled with regard to risks of tobacco use.    PHQ-9 Depression Screening  PHQ-9 Total Score:      Little interest or pleasure in doing things?     Feeling down, depressed, or hopeless?     Trouble falling or staying asleep, or sleeping too much?     Feeling tired or having little energy?     Poor appetite or overeating?     Feeling bad about yourself - or that you are a failure or have let yourself or your family down?     Trouble concentrating on things, such as reading the newspaper or watching television?     Moving or speaking so slowly that other people could have noticed? Or the opposite - being so fidgety or restless that you have been moving around a lot more than usual?     Thoughts that you would be better off dead, or of hurting yourself in some way?     PHQ-9 Total Score       Change in PHQ-9 since last measure: N/A (13)    VIOLETTE-7       Change in VIOLETTE-7 since last measure: N/A (16)    Problem List:  Patient Active Problem List   Diagnosis    Stroke    VIOLETTE (generalized anxiety disorder)    History of stroke    Cat bite    Attention deficit hyperactivity disorder (ADHD), combined type    Major depressive disorder, recurrent episode, moderate     Allergy:   Allergies   Allergen Reactions    Naproxen Hives and Anaphylaxis      Discontinued Medications:  Medications Discontinued During This Encounter   Medication Reason    amphetamine-dextroamphetamine (ADDERALL) 10 MG tablet      Current Medications:   Current Outpatient Medications   Medication Sig Dispense Refill    DULoxetine (Cymbalta) 60 MG capsule Take 1 capsule by mouth Daily. 90 capsule 3    hydrOXYzine (ATARAX) 25 MG tablet Take 1 tablet by mouth every night at bedtime. 90 tablet 3    ibuprofen (ADVIL,MOTRIN) 800 MG tablet Take 1 tablet by mouth 2 (Two) Times a Day As Needed.      traZODone (DESYREL) 50 MG tablet Take 1-2 tabs po qhs prn insomnia 90 tablet 3     No current facility-administered medications for this visit.     Past Medical  History:  Past Medical History:   Diagnosis Date    Acid reflux     Allergic rhinitis     Anxiety     Arthritis     Depression     Hyperlipidemia     Hypertension     Leg pain     Seasonal allergies     Sinus trouble     Sleep disturbance     Stroke 3/16/2021    Thumb fracture 2012    Right     Past Surgical History:  Past Surgical History:   Procedure Laterality Date    ANKLE SURGERY      KNEE PCL RECONSTRUCTION Left 2014    open ligament     Mental Status Exam:   Appearance: well-groomed, sits upright, age-appropriate, normal habitus  Behavior: calm, cooperative, appropriate in demeanor, appropriate eye-contact  Mood/affect: euthymic / mood-congruent and appropriate in both range and amplitude  Speech: within expected variance; appropriate rate, appropriate rhythm, appropriate tone; non-pressured  Thought Process: linear, goal-directed; no FOI or ANASTACIA; abstraction intact  Thought Content: coherent, devoid of overt delusions/perceptual disturbances  SI/HI: denies both SI and HI; exhibits future-orientation, self-advocates appropriately, no regular self-harm, no appreciable intent  Memory: no overt deficits  Orientation: oriented to person/place/time/situation  Concentration: appropriate during interview  Intellectual capacity: presumptively average  Insight: fair by given history/exam  Judgment: appropriate by given history/exam  Psychomotor: no appreciable latency/retardation/agitation/tremor  Gait: WNL    Review of Systems:  Review of Systems   Constitutional:  Negative for activity change, appetite change and unexpected weight change.   HENT:  Negative for drooling.    Eyes:  Negative for visual disturbance.   Respiratory:  Negative for chest tightness and shortness of breath.    Cardiovascular:  Negative for chest pain and palpitations.   Gastrointestinal:  Negative for abdominal pain, diarrhea and nausea.   Endocrine: Negative for cold intolerance and heat intolerance.   Genitourinary:  Negative for difficulty  "urinating and frequency.   Musculoskeletal:  Negative for myalgias and neck stiffness.   Skin:  Negative for rash.   Neurological:  Negative for dizziness, tremors, seizures and light-headedness.     Vital Signs:   /80   Pulse (!) 126   Ht 162.6 cm (64\")   Wt 69.1 kg (152 lb 6.4 oz)   BMI 26.16 kg/m²      Lab Results:   Office Visit on 07/19/2023   Component Date Value Ref Range Status    Amphet/Methamphet, Screen 07/20/2023 Positive (A)  Negative Final    Barbiturates Screen, Urine 07/20/2023 Negative  Negative Final    Benzodiazepine Screen, Urine 07/20/2023 Negative  Negative Final    Cocaine Screen, Urine 07/20/2023 Negative  Negative Final    Opiate Screen 07/20/2023 Negative  Negative Final    THC, Screen, Urine 07/20/2023 Negative  Negative Final    Methadone Screen, Urine 07/20/2023 Negative  Negative Final    Oxycodone Screen, Urine 07/20/2023 Negative  Negative Final    Fentanyl, Urine 07/20/2023 Negative  Negative Final   Office Visit on 06/06/2023   Component Date Value Ref Range Status    Glucose 06/06/2023 71  65 - 99 mg/dL Final    BUN 06/06/2023 13  6 - 20 mg/dL Final    Creatinine 06/06/2023 1.01 (H)  0.57 - 1.00 mg/dL Final    Sodium 06/06/2023 140  136 - 145 mmol/L Final    Potassium 06/06/2023 4.5  3.5 - 5.2 mmol/L Final    Chloride 06/06/2023 105  98 - 107 mmol/L Final    CO2 06/06/2023 26.0  22.0 - 29.0 mmol/L Final    Calcium 06/06/2023 9.5  8.6 - 10.5 mg/dL Final    Total Protein 06/06/2023 6.9  6.0 - 8.5 g/dL Final    Albumin 06/06/2023 4.5  3.5 - 5.2 g/dL Final    ALT (SGPT) 06/06/2023 13  1 - 33 U/L Final    AST (SGOT) 06/06/2023 24  1 - 32 U/L Final    Alkaline Phosphatase 06/06/2023 55  39 - 117 U/L Final    Total Bilirubin 06/06/2023 0.3  0.0 - 1.2 mg/dL Final    Globulin 06/06/2023 2.4  gm/dL Final    A/G Ratio 06/06/2023 1.9  g/dL Final    BUN/Creatinine Ratio 06/06/2023 12.9  7.0 - 25.0 Final    Anion Gap 06/06/2023 9.0  5.0 - 15.0 mmol/L Final    eGFR 06/06/2023 72.3  " >60.0 mL/min/1.73 Final    Hemoglobin A1C 06/06/2023 5.30  4.80 - 5.60 % Final    Hepatitis C Ab 06/06/2023 Non-Reactive  Non-Reactive Final    TSH 06/06/2023 1.070  0.270 - 4.200 uIU/mL Final    Free T4 06/06/2023 1.05  0.93 - 1.70 ng/dL Final    Total Cholesterol 06/06/2023 197  0 - 200 mg/dL Final    Triglycerides 06/06/2023 55  0 - 150 mg/dL Final    HDL Cholesterol 06/06/2023 89 (H)  40 - 60 mg/dL Final    LDL Cholesterol  06/06/2023 98  0 - 100 mg/dL Final    VLDL Cholesterol 06/06/2023 10  5 - 40 mg/dL Final    LDL/HDL Ratio 06/06/2023 1.09   Final    WBC 06/06/2023 5.53  3.40 - 10.80 10*3/mm3 Final    RBC 06/06/2023 4.28  3.77 - 5.28 10*6/mm3 Final    Hemoglobin 06/06/2023 12.7  12.0 - 15.9 g/dL Final    Hematocrit 06/06/2023 37.7  34.0 - 46.6 % Final    MCV 06/06/2023 88.1  79.0 - 97.0 fL Final    MCH 06/06/2023 29.7  26.6 - 33.0 pg Final    MCHC 06/06/2023 33.7  31.5 - 35.7 g/dL Final    RDW 06/06/2023 12.4  12.3 - 15.4 % Final    RDW-SD 06/06/2023 40.2  37.0 - 54.0 fl Final    MPV 06/06/2023 11.0  6.0 - 12.0 fL Final    Platelets 06/06/2023 304  140 - 450 10*3/mm3 Final    Neutrophil % 06/06/2023 49.7  42.7 - 76.0 % Final    Lymphocyte % 06/06/2023 33.1  19.6 - 45.3 % Final    Monocyte % 06/06/2023 12.1 (H)  5.0 - 12.0 % Final    Eosinophil % 06/06/2023 4.2  0.3 - 6.2 % Final    Basophil % 06/06/2023 0.7  0.0 - 1.5 % Final    Immature Grans % 06/06/2023 0.2  0.0 - 0.5 % Final    Neutrophils, Absolute 06/06/2023 2.75  1.70 - 7.00 10*3/mm3 Final    Lymphocytes, Absolute 06/06/2023 1.83  0.70 - 3.10 10*3/mm3 Final    Monocytes, Absolute 06/06/2023 0.67  0.10 - 0.90 10*3/mm3 Final    Eosinophils, Absolute 06/06/2023 0.23  0.00 - 0.40 10*3/mm3 Final    Basophils, Absolute 06/06/2023 0.04  0.00 - 0.20 10*3/mm3 Final    Immature Grans, Absolute 06/06/2023 0.01  0.00 - 0.05 10*3/mm3 Final    nRBC 06/06/2023 0.0  0.0 - 0.2 /100 WBC Final     EKG Results:  No orders to display     Imaging Results:  No Images in the  past 120 days found.    ASSESSMENT AND PLAN:    ICD-10-CM ICD-9-CM   1. Attention deficit hyperactivity disorder (ADHD), combined type  F90.2 314.01   2. VIOLETTE (generalized anxiety disorder)  F41.1 300.02   3. Major depressive disorder, recurrent episode, moderate  F33.1 296.32   4. Tachycardia  R00.0 785.0     38 y.o. female who presents today for follow-up regarding ADHD, VIOLETTE, MDD. We have discussed the interval history and the treatment plan below, including potential R/B/SE of the recommended regimen of which the patient demonstrates understanding. Patient is agreeable to call 911 or go to the nearest ER should she become concerned for her own safety and/or the safety of those around her. There are no overt indices of acute tierney/psychosis on exam today. SANDEE reviewed and as expected.    Medication regimen: titrate amphetamine to 15 mg BID, continue duloxetine, hydroxyzine, trazodone; patient is advised not to misuse prescribed medications or to use them with any exogenous substances that aren't disclosed to this provider as they may interact with the regimen to the patient's detriment.   Risk Assessment: protracted risk is low, imminent risk is low - slight interval change. Do note that this is subject to change with the [place] of new stressors, treatment non-adherence, use of substances, and/or new medical ails.   Monitoring: reviewed labs/imaging as populated above  Therapy: referred  Follow-up: 2 months  Communications: N/A    TREATMENT PLAN/GOALS: challenge patterns of living conducive to symptom burden, implement recommended regimen as above with augmentative, intermittent supportive psychotherapy to reduce symptom burden. Patient acknowledged and verbally consented to continue treatment. The importance of adherence to the recommended treatment and interval follow-up appointments was again emphasized today: patient has good treatment adherence per given history. Patient was today reminded to limit daily  caffeine intake, hydrate appropriately, eat healthy and nutritious foods, engage sleep hygiene measures, engage appropriate exposure to sunlight, engage with hobbies in balance with life necessities, and exercise appropriate to their capacity to do so.     Billing: This encounter is of moderate complexity based on number/complexity of problems addressed today and risk of complications/morbidity: 2+ stable chronic illnesses and prescription management.    Parts of this note are electronic transcriptions/translations of spoken language to printed text using the Dragon Dictation system.    Electronically signed by Tejinder Amezcua MD, 11/30/23, [id number]

## 2024-01-04 DIAGNOSIS — F41.9 ANXIETY: ICD-10-CM

## 2024-01-04 RX ORDER — HYDROXYZINE HYDROCHLORIDE 25 MG/1
25 TABLET, FILM COATED ORAL
Qty: 90 TABLET | Refills: 1 | Status: SHIPPED | OUTPATIENT
Start: 2024-01-04

## 2024-01-22 ENCOUNTER — OFFICE VISIT (OUTPATIENT)
Dept: FAMILY MEDICINE CLINIC | Facility: CLINIC | Age: 39
End: 2024-01-22
Payer: COMMERCIAL

## 2024-01-22 VITALS
HEART RATE: 101 BPM | BODY MASS INDEX: 25.95 KG/M2 | HEIGHT: 64 IN | TEMPERATURE: 98.3 F | SYSTOLIC BLOOD PRESSURE: 128 MMHG | OXYGEN SATURATION: 98 % | DIASTOLIC BLOOD PRESSURE: 89 MMHG | WEIGHT: 152 LBS

## 2024-01-22 DIAGNOSIS — B34.9 VIRAL ILLNESS: Primary | ICD-10-CM

## 2024-01-22 DIAGNOSIS — R50.9 FEVER, UNSPECIFIED FEVER CAUSE: ICD-10-CM

## 2024-01-22 LAB
EXPIRATION DATE: NORMAL
FLUAV AG UPPER RESP QL IA.RAPID: NOT DETECTED
FLUBV AG UPPER RESP QL IA.RAPID: NOT DETECTED
INTERNAL CONTROL: NORMAL
Lab: NORMAL
SARS-COV-2 AG UPPER RESP QL IA.RAPID: NOT DETECTED

## 2024-01-22 PROCEDURE — 99213 OFFICE O/P EST LOW 20 MIN: CPT | Performed by: NURSE PRACTITIONER

## 2024-01-22 PROCEDURE — 87428 SARSCOV & INF VIR A&B AG IA: CPT | Performed by: NURSE PRACTITIONER

## 2024-01-22 NOTE — LETTER
January 22, 2024     Patient: Laurel Molina   YOB: 1985   Date of Visit: 1/22/2024       To Whom It May Concern:    It is my medical opinion that Laurel Molina may return to work on 1/26/2024 .           Sincerely,        MICHA Muñiz

## 2024-01-22 NOTE — PROGRESS NOTES
Chief Complaint  Fever (Since Friday; Co worker tested positive for COVID), Cough (Since Friday ), and Nasal Congestion (Since Friday )    Subjective          Laurel Molina is a 38 y.o. female who presents to [facility]    History of Present Illness    Fever, exposure to Covid. Cough non productive. Nasal congestion.   Fever yesterday. Symptoms onset Saturday.         Review of Systems   Constitutional:  Positive for fever.   HENT:  Positive for congestion and sinus pressure.    Respiratory:  Positive for cough.    Musculoskeletal:  Positive for myalgias.          Medical History: has a past medical history of Acid reflux, Allergic rhinitis, Anxiety, Arthritis, Depression, Hyperlipidemia, Hypertension, Leg pain, Seasonal allergies, Sinus trouble, Sleep disturbance, Stroke (3/16/2021), and Thumb fracture (2012).     Surgical History: has a past surgical history that includes Ankle surgery and Posterior cruciate ligament repair (Left, 2014).     Family History: family history includes Anxiety disorder in her mother; Arthritis in her brother, father, and mother; COPD in her father; Diabetes in her brother, father, and mother; Heart disease in her brother and father; Hyperlipidemia in her father; Other in her father.     Social History: reports that she has never smoked. She has never used smokeless tobacco. She reports that she does not drink alcohol and does not use drugs.    Allergies: Naproxen      Health Maintenance Due   Topic Date Due    ANNUAL PHYSICAL  Never done    INFLUENZA VACCINE  Never done    COVID-19 Vaccine (5 - 2023-24 season) 09/01/2023            Current Outpatient Medications:     amphetamine-dextroamphetamine (ADDERALL) 15 MG tablet, Take 1 tablet by mouth 2 (Two) Times a Day., Disp: 60 tablet, Rfl: 0    DULoxetine (Cymbalta) 60 MG capsule, Take 1 capsule by mouth Daily., Disp: 90 capsule, Rfl: 3    hydrOXYzine (ATARAX) 25 MG tablet, TAKE 1 TABLET BY MOUTH  "EVERYDAY AT BEDTIME, Disp: 90 tablet, Rfl: 1    ibuprofen (ADVIL,MOTRIN) 800 MG tablet, Take 1 tablet by mouth 2 (Two) Times a Day As Needed., Disp: , Rfl:     traZODone (DESYREL) 50 MG tablet, Take 1-2 tabs po qhs prn insomnia, Disp: 90 tablet, Rfl: 3      Immunization History   Administered Date(s) Administered    COVID-19 (PFIZER) Purple Cap Monovalent 06/25/2021, 06/25/2021, 07/16/2021, 07/16/2021    RABIES IM 03/30/2023    Rabies 03/16/2023, 03/19/2023, 03/23/2023, 03/30/2023    Rabies Ig Heat-treated Human Im/subq 03/16/2023    Td (TDVAX) 06/12/1998    Tdap 03/16/2023         Objective       Vitals:    01/22/24 1501   BP: 128/89   Pulse: 101   Temp: 98.3 °F (36.8 °C)   TempSrc: Temporal   SpO2: 98%   Weight: 68.9 kg (152 lb)   Height: 162.6 cm (64.02\")      Body mass index is 26.08 kg/m².   Wt Readings from Last 3 Encounters:   01/22/24 68.9 kg (152 lb)   11/30/23 69.1 kg (152 lb 6.4 oz)   08/30/23 67.5 kg (148 lb 12.8 oz)      BP Readings from Last 3 Encounters:   01/22/24 128/89   11/30/23 130/80   08/30/23 124/90                Physical Exam  Constitutional:       Appearance: She is well-developed. She is not ill-appearing or toxic-appearing.   HENT:      Head: Normocephalic and atraumatic.      Right Ear: Hearing, tympanic membrane, ear canal and external ear normal.      Left Ear: Hearing, tympanic membrane, ear canal and external ear normal.      Nose: No nasal deformity, mucosal edema, congestion or rhinorrhea.      Mouth/Throat:      Dentition: Normal dentition.      Pharynx: No oropharyngeal exudate or posterior oropharyngeal erythema.      Tonsils: No tonsillar abscesses.   Cardiovascular:      Rate and Rhythm: Normal rate and regular rhythm.   Pulmonary:      Effort: No tachypnea, bradypnea or respiratory distress.      Breath sounds: Normal breath sounds. No wheezing or rhonchi.   Skin:     General: Skin is warm and dry.   Neurological:      General: No focal deficit present.   Psychiatric:         " Mood and Affect: Mood normal.         Behavior: Behavior normal.         Thought Content: Thought content normal.         Judgment: Judgment normal.             Result Review :       Common labs          6/6/2023    08:11   Common Labs   Glucose 71    BUN 13    Creatinine 1.01    Sodium 140    Potassium 4.5    Chloride 105    Calcium 9.5    Albumin 4.5    Total Bilirubin 0.3    Alkaline Phosphatase 55    AST (SGOT) 24    ALT (SGPT) 13    WBC 5.53    Hemoglobin 12.7    Hematocrit 37.7    Platelets 304    Total Cholesterol 197    Triglycerides 55    HDL Cholesterol 89    LDL Cholesterol  98    Hemoglobin A1C 5.30        Lab Results   Component Value Date    SARSANTIGEN Not Detected 01/22/2024    COVID19 Not Detected 08/02/2021    FLUAAG Not Detected 01/22/2024    RAPSCRN Negative 08/02/2021    POCPREGUR Negative 03/16/2023    INR 0.94 (L) 03/16/2021                  Assessment and Plan        Diagnoses and all orders for this visit:    1. Viral illness (Primary)  Comments:  recommend return to [facility] on Friday given fever and symptoms w/ covid exposure.    2. Fever, unspecified fever cause  -     POCT SARS-CoV-2 Antigen JOSÉ + Flu      Take medication as required for pain or fever.    Diagnosis and course explained.    Increase fluids and monitor output.    Supportive care. If covid negative on Thursday return to work w/o mask. If covid positive wear mask day 6-10.    Follow Up     Return if symptoms worsen or fail to improve.    Patient was given instructions and counseling regarding her condition or for health maintenance advice. Please see specific information pulled into the AVS if appropriate.     MICHA Muñiz

## 2024-02-14 ENCOUNTER — OFFICE VISIT (OUTPATIENT)
Dept: PSYCHIATRY | Facility: CLINIC | Age: 39
End: 2024-02-14
Payer: COMMERCIAL

## 2024-02-14 VITALS
BODY MASS INDEX: 26.04 KG/M2 | SYSTOLIC BLOOD PRESSURE: 129 MMHG | WEIGHT: 151.8 LBS | DIASTOLIC BLOOD PRESSURE: 89 MMHG | HEART RATE: 100 BPM

## 2024-02-14 DIAGNOSIS — F41.1 GAD (GENERALIZED ANXIETY DISORDER): ICD-10-CM

## 2024-02-14 DIAGNOSIS — F33.42 MAJOR DEPRESSION, RECURRENT, FULL REMISSION: ICD-10-CM

## 2024-02-14 DIAGNOSIS — F90.2 ATTENTION DEFICIT HYPERACTIVITY DISORDER (ADHD), COMBINED TYPE: Primary | ICD-10-CM

## 2024-02-14 RX ORDER — DEXTROAMPHETAMINE SACCHARATE, AMPHETAMINE ASPARTATE, DEXTROAMPHETAMINE SULFATE AND AMPHETAMINE SULFATE 3.75; 3.75; 3.75; 3.75 MG/1; MG/1; MG/1; MG/1
15 TABLET ORAL 2 TIMES DAILY
Qty: 60 TABLET | Refills: 0 | Status: SHIPPED | OUTPATIENT
Start: 2024-02-14

## 2024-02-14 RX ORDER — DEXTROAMPHETAMINE SACCHARATE, AMPHETAMINE ASPARTATE, DEXTROAMPHETAMINE SULFATE AND AMPHETAMINE SULFATE 3.75; 3.75; 3.75; 3.75 MG/1; MG/1; MG/1; MG/1
15 TABLET ORAL 2 TIMES DAILY
Qty: 60 TABLET | Refills: 0 | Status: SHIPPED | OUTPATIENT
Start: 2024-03-14

## 2024-05-06 ENCOUNTER — OFFICE VISIT (OUTPATIENT)
Dept: PSYCHIATRY | Facility: CLINIC | Age: 39
End: 2024-05-06
Payer: COMMERCIAL

## 2024-05-06 VITALS
DIASTOLIC BLOOD PRESSURE: 88 MMHG | SYSTOLIC BLOOD PRESSURE: 125 MMHG | HEART RATE: 106 BPM | BODY MASS INDEX: 25.88 KG/M2 | WEIGHT: 151.6 LBS | HEIGHT: 64 IN

## 2024-05-06 DIAGNOSIS — F33.1 MAJOR DEPRESSIVE DISORDER, RECURRENT, MODERATE: Primary | ICD-10-CM

## 2024-05-06 DIAGNOSIS — F90.2 ATTENTION DEFICIT HYPERACTIVITY DISORDER (ADHD), COMBINED TYPE: ICD-10-CM

## 2024-05-06 DIAGNOSIS — F41.1 GAD (GENERALIZED ANXIETY DISORDER): ICD-10-CM

## 2024-05-06 PROCEDURE — 90833 PSYTX W PT W E/M 30 MIN: CPT | Performed by: PSYCHIATRY & NEUROLOGY

## 2024-05-06 PROCEDURE — 99214 OFFICE O/P EST MOD 30 MIN: CPT | Performed by: PSYCHIATRY & NEUROLOGY

## 2024-05-06 RX ORDER — DULOXETIN HYDROCHLORIDE 30 MG/1
30 CAPSULE, DELAYED RELEASE ORAL DAILY
Qty: 30 CAPSULE | Refills: 1 | Status: SHIPPED | OUTPATIENT
Start: 2024-05-06

## 2024-05-17 NOTE — PROGRESS NOTES
Progress Note    Date: 05/20/2024  Time In: 9:00  Time Out: 9:56    Patient Legal Name: Laurel Molina  Patient Age: 38 y.o.  Referring Provider:   Tejinder Bronson Md  [address] [facility]  [address] [place] [id number]     Mode of visit: In person  Location of provider: [address], [place] [address]  Location of patient: Office    CHIEF COMPLAINT: anxiety, depression    Subjective   History of Present Illness     Laurel is a 38 y.o. female presenting for initial session with this therapist. Patient reported her medications are working well.  Patient stated she has not seen a therapist before.  Patient shared about her relationship hx. Patient advised she is currently in a relationship.  Patient reported her focus has improved.  Patient shared she worries about a lot of things. Patient described a hx of passive SI but not currently experiencing this.  Patient rated her anxiety at 4 and depression at 5 today on a 0-10 scale where 0= no symptoms.Patient noted this is lower than usual. Patient stated she has been trying to focus on the present and more positive things and not worry about the past. Patient stated she has started reading again since her focus has improved. 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 Patient is voluntarily requesting to participate in outpatient therapy at [facility].     History obtained from referring provider's note on 5/6/24:  Psychiatric History:  Diagnoses: ADD, anxiety, depression  Outpatient history: denies  Inpatient history: denies  Medication trials: sertraline (worked okay), bupropion (ineffective 150 mg), buspirone (didn't seem to have an impact; was taking once daily)  Other treatment modalities: has not done therapy in the past  Presenting regimen: atomoxetine 40 mg QD, duloxetine 60 mg QD, hydroxyzine 25 mg HS, trazodone 50 mg HS  Self harm: years ago cut  Suicide attempts:  denies      Assessment    Mental Status Exam     Appearance: good hygiene and dressed appropriately for the weather  Behavior: calm  Cooperation:  engaged, cooperative, attentive, and friendly  Eye Contact:  good  Affect:  congruent  Mood: depressed and anxious  Speech: responsive  Thought Process:  organized  Thought Content: appropriate  Suicidal: denies  Homicidal:  denies  Hallucinations:  denies  Memory:  intact  Orientation:  person, place, time, and situation  Reliability:  reliable  Insight:  good  Judgment:  good     Clinical Intervention       ICD-10-CM ICD-9-CM   1. VIOLETTE (generalized anxiety disorder)  F41.1 300.02   2. Major depressive disorder, recurrent episode, moderate  F33.1 296.32        Individual psychotherapy was provided utilizing CBT, SFT, and person-centered techniques to build rapport, encourage expression of thoughts and feelings, support self-esteem, establish new coping skills, assess symptoms, gather history, recognize cognitive distortions, and provide psychoeducation. Therapist provided psychoeducation on the cognitive behavioral model and seeking/acknowledging positive experiences and thoughts to improve overall mood. Patient was given a list of cognitive distortions and was encouraged to begin recognizing these patterns of thinking which could cause decline in mood.  Therapist utilized open-ended questions to encourage the development of a positive therapeutic relationship and open communication.  Therapist normalized/validated patient’s thoughts and feelings as appropriate. Presented strategies for combating cognitive distortions. Introduced and explained circles of control. 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 Patient rated her anxiety at 4 and depression at 5 today on a 0-10 scale where 0= no symptoms.    Plan   Plan & Goals     Moving forward, we will continue to build rapport and reinforce and build upon effective coping strategies utilizing CBT and  person-centered techniques.    Patient acknowledged and verbally consented to continue working toward resolving current treatment plan goals and was educated on the importance of participation in the therapeutic process.  Patient will remain compliant with medication regimen as prescribed. Discuss any medication side effects, questions or concerns with prescribing provider.  Call 911 or present to the nearest emergency room in an emergency situation.   National Suicide Prevention Lifeline: Call 988. The Lifeline provides 24/7, free and confidential support for people in distress, prevention and crisis resources.  Crisis Text Line  Text HOME To 793379    Return in about 2 weeks (around 6/3/2024).    ____________________  This document has been electronically signed by Esthela Velasquez LCSW  May 20, 2024 12:09 EDT    Part of this note may be an electronic transcription/translation of spoken language to printed text using the Dragon Dictation System.

## 2024-05-20 ENCOUNTER — OFFICE VISIT (OUTPATIENT)
Dept: BEHAVIORAL HEALTH | Facility: CLINIC | Age: 39
End: 2024-05-20
Payer: COMMERCIAL

## 2024-05-20 DIAGNOSIS — F33.1 MAJOR DEPRESSIVE DISORDER, RECURRENT EPISODE, MODERATE: ICD-10-CM

## 2024-05-20 DIAGNOSIS — F41.1 GAD (GENERALIZED ANXIETY DISORDER): Primary | ICD-10-CM

## 2024-05-20 PROCEDURE — 90837 PSYTX W PT 60 MINUTES: CPT | Performed by: SOCIAL WORKER

## 2024-05-30 NOTE — PROGRESS NOTES
"Progress Note    Date: 06/03/2024  Time In: 9:00  Time Out: 9:37    Patient Legal Name: Laurel Molina  Patient Age: 39 y.o.    Mode of visit: In person  Location of provider: [address], [place] [address]  Location of patient: Office      CHIEF COMPLAINT: anxiety    Subjective   History of Present Illness   Laurel is a 39 y.o. female who presents today as a follow-up for continued psychotherapy. Patient reported she has been doing better with how she reacts to stuff.  Patient stated she is getting less irritated about stuff at work.  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 Patient advised right now she is \"pretty good on most stuff.\"  Patient reported she worries about family and her mom getting older. Patient shared she also worries about finances as well. Patient described herself as a \"perfectionist.\" Patient advised things are going well in her relationship. Patient is planning upcoming vacations. Patient is voluntarily requesting to participate in outpatient therapy at [facility].  Patient rated her anxiety at 2 and depression at 1 today on a 0-10 scale where 0= no symptoms (both numbers lower than last visit). Patient had little to talk about today and session ended early.    History obtained from referring provider's note on 5/6/24:  Psychiatric History:  Diagnoses: ADD, anxiety, depression  Outpatient history: denies  Inpatient history: denies  Medication trials: sertraline (worked okay), bupropion (ineffective 150 mg), buspirone (didn't seem to have an impact; was taking once daily)  Other treatment modalities: has not done therapy in the past  Presenting regimen: atomoxetine 40 mg QD, duloxetine 60 mg QD, hydroxyzine 25 mg HS, trazodone 50 mg HS  Self harm: years ago cut  Suicide attempts: denies    Assessment    Mental Status Exam     Appearance: good hygiene and dressed " appropriately for the weather  Behavior: calm  Cooperation:  engaged, cooperative, attentive, and friendly  Eye Contact:  good  Affect:  congruent  Mood: expressive  Speech: responsive  Thought Process:  organized  Thought Content: appropriate  Suicidal: denies  Homicidal:  denies  Hallucinations:  denies  Memory:  intact  Orientation:  person, place, time, and situation  Reliability:  reliable  Insight:  good  Judgment:  good    Clinical Intervention       ICD-10-CM ICD-9-CM   1. VIOLETTE (generalized anxiety disorder)  F41.1 300.02        Individual psychotherapy was provided utilizing CBT and person-centered techniques to build rapport, encourage expression of thoughts and feelings, support self-esteem, assess symptoms, gather history, and provide psychoeducation.  Therapist utilized open-ended questions to encourage the development of a positive therapeutic relationship and open communication.  Therapist normalized/validated patient’s thoughts and feelings as appropriate. Patient rated her anxiety at 2 and depression at 1 today on a 0-10 scale where 0= no symptoms (both numbers lower than last visit). Discussed perfectionism and black and white thinking and ways to combat them. Praised patient for progress made.    Plan   Plan & Goals     Moving forward, we will continue to build rapport and reinforce and build upon effective coping strategies utilizing CBT and person-centered techniques.    Patient acknowledged and verbally consented to continue working toward resolving current treatment plan goals and was educated on the importance of participation in the therapeutic process.  Patient will remain compliant with medication regimen as prescribed. Discuss any medication side effects, questions or concerns with prescribing provider.  Call 911 or present to the nearest emergency room in an emergency situation.  National Suicide Prevention Lifeline: Call 988. The Lifeline provides 24/7, free and confidential support for  people in distress, prevention and crisis resources.  Crisis Text Line  Text HOME To 309088    Return in about 3 weeks (around 6/24/2024).    ____________________  This document has been electronically signed by Esthela Velasquez LCSW  Kari 3, 2024 09:46 EDT    Part of this note may be an electronic transcription/translation of spoken language to printed text using the Dragon Dictation System.

## 2024-06-03 ENCOUNTER — OFFICE VISIT (OUTPATIENT)
Dept: BEHAVIORAL HEALTH | Facility: CLINIC | Age: 39
End: 2024-06-03
Payer: COMMERCIAL

## 2024-06-03 DIAGNOSIS — Z51.81 THERAPEUTIC DRUG MONITORING: Primary | ICD-10-CM

## 2024-06-03 DIAGNOSIS — F41.1 GAD (GENERALIZED ANXIETY DISORDER): Primary | ICD-10-CM

## 2024-06-03 DIAGNOSIS — F90.2 ATTENTION DEFICIT HYPERACTIVITY DISORDER (ADHD), COMBINED TYPE: ICD-10-CM

## 2024-06-03 PROCEDURE — 90832 PSYTX W PT 30 MINUTES: CPT | Performed by: SOCIAL WORKER

## 2024-06-03 RX ORDER — DEXTROAMPHETAMINE SACCHARATE, AMPHETAMINE ASPARTATE, DEXTROAMPHETAMINE SULFATE AND AMPHETAMINE SULFATE 3.75; 3.75; 3.75; 3.75 MG/1; MG/1; MG/1; MG/1
15 TABLET ORAL 2 TIMES DAILY
Qty: 60 TABLET | Refills: 0 | Status: SHIPPED | OUTPATIENT
Start: 2024-06-03

## 2024-06-03 NOTE — TELEPHONE ENCOUNTER
CONTROLLED MEDICATION REFILL REQUEST    STATE REGULATION APPT EVERY 3 MONTHS     UDS(URINE DRUG SCREEN) EVERY 6 MONTHS OR UP TO PROVIDER PREFERENCE      NEW NARC CONSENT EVERY YEAR      MEDICATION: amphetamine-dextroamphetamine (ADDERALL) 15 MG tablet (02/14/2024)      NEXT OFFICE VISIT: Appointment with Tejinder Amezcua MD (06/19/2024)     LAST OFFICE VISIT: Office Visit with Tejinder Amezcua MD (05/06/2024)     NARC CONSENT: CONTROLLED SUBSTANCE AGREEMENT - SCAN - CONTROLLED SUBTANCE AGREEMENT, BEHAVIORAL HEALTH, 07/19/2023 (07/19/2023)     URINE DRUG SCREEN(STANDING ORDER): UDS(URINE DRUG SCREEN) PENDED FOR PROVIDER REVIEW     PROVIDER PLEASE ADVISE

## 2024-06-19 ENCOUNTER — OFFICE VISIT (OUTPATIENT)
Dept: PSYCHIATRY | Facility: CLINIC | Age: 39
End: 2024-06-19
Payer: COMMERCIAL

## 2024-06-19 VITALS
HEART RATE: 102 BPM | BODY MASS INDEX: 25.78 KG/M2 | DIASTOLIC BLOOD PRESSURE: 91 MMHG | HEIGHT: 64 IN | SYSTOLIC BLOOD PRESSURE: 129 MMHG | WEIGHT: 151 LBS

## 2024-06-19 DIAGNOSIS — F33.1 MAJOR DEPRESSIVE DISORDER, RECURRENT, MODERATE: ICD-10-CM

## 2024-06-19 DIAGNOSIS — F90.2 ATTENTION DEFICIT HYPERACTIVITY DISORDER (ADHD), COMBINED TYPE: Primary | ICD-10-CM

## 2024-06-19 DIAGNOSIS — F51.01 PRIMARY INSOMNIA: ICD-10-CM

## 2024-06-19 DIAGNOSIS — F41.1 GAD (GENERALIZED ANXIETY DISORDER): ICD-10-CM

## 2024-06-19 PROBLEM — F33.41 MAJOR DEPRESSIVE DISORDER, RECURRENT EPISODE, IN PARTIAL REMISSION: Status: ACTIVE | Noted: 2023-07-19

## 2024-06-19 PROCEDURE — 99214 OFFICE O/P EST MOD 30 MIN: CPT | Performed by: PSYCHIATRY & NEUROLOGY

## 2024-06-19 RX ORDER — DEXTROAMPHETAMINE SACCHARATE, AMPHETAMINE ASPARTATE, DEXTROAMPHETAMINE SULFATE AND AMPHETAMINE SULFATE 5; 5; 5; 5 MG/1; MG/1; MG/1; MG/1
20 TABLET ORAL 2 TIMES DAILY
Qty: 60 TABLET | Refills: 0 | Status: SHIPPED | OUTPATIENT
Start: 2024-06-19

## 2024-06-19 RX ORDER — HYDROXYZINE HYDROCHLORIDE 25 MG/1
25 TABLET, FILM COATED ORAL
Qty: 90 TABLET | Refills: 0 | Status: SHIPPED | OUTPATIENT
Start: 2024-06-19

## 2024-06-19 RX ORDER — DULOXETIN HYDROCHLORIDE 30 MG/1
30 CAPSULE, DELAYED RELEASE ORAL DAILY
Qty: 90 CAPSULE | Refills: 0 | Status: SHIPPED | OUTPATIENT
Start: 2024-06-19

## 2024-06-19 RX ORDER — DEXTROAMPHETAMINE SACCHARATE, AMPHETAMINE ASPARTATE, DEXTROAMPHETAMINE SULFATE AND AMPHETAMINE SULFATE 5; 5; 5; 5 MG/1; MG/1; MG/1; MG/1
20 TABLET ORAL 2 TIMES DAILY
Qty: 60 TABLET | Refills: 0 | Status: SHIPPED | OUTPATIENT
Start: 2024-07-18

## 2024-06-19 NOTE — PROGRESS NOTES
"[facility]  Follow-up Visit    Chief Complaint: \"I went to the doctor for having trouble focusing... doing school, work... in the process of moving... I get overwhelmed and don't actually start on something... get distracted and do something else... need to sit down and study... focus on what I'm doing... hard to get started doing that.\"     History of Present Illness: Laurel Molina is a 39 y.o. female who presents today for follow-up regarding ADHD, VIOLETTE, MDD. Last seen: 05/06 at which time duloxetine was titrated. She presents unaccompanied in no acute distress and engages with me appropriately. Psychotropic regimen perceived to be partially effective. Side-effects per given history: denies.    Current treatment regimen includes:   - amphetamine 15 mg BID  - duloxetine 90 mg QD  - hydroxyzine 25 mg HS  - trazodone 50 mg HS    Today she reports she's doing a bit better since our last visit; she's had her ex on her mind less frequently, feels therapy has been helpful to process these emotions, and that medication changes have been beneficial as well. ADHD symptoms are partially managed with current interventions; she feels her dose could be optimized. Anxiety symptoms are partially managed with current interventions; current stressors contributory to symptom burden. Depression symptoms are fairly managed with current interventions. Thought process and content are devoid of overt aberration suggestive of acute tierney/psychosis. The patient denies SI/HI/AVH. There are no overt changes on exam today compared to most recent evaluation.  - contextual changes: has seen a therapist twice; relationship with Munir is going well and she's told him about the above stress; skin-picking has improved; goes to [place] in a couple of weeks with her boyfriend, then to [place] in August as payment for dog sitting  - sleep: generally adequate; no change  - appetite: generally adequate; no change    I " have counseled the patient with regard to diagnoses and the recommended treatment regimen as documented below. Patient acknowledges the diagnoses per my rendered interpretation. Patient demonstrates understanding of potential risks/benefits/side effects associated with this regimen and is amenable to proceed in this fashion.     Psychotherapy  - Time: 7 minutes  - interventions employed: the therapeutic alliance was strengthened to encourage the patient to express their thoughts and feelings freely. Esteem building was enhanced through praise, reassurance, normalizing/challenging, and encouragement as appropriate. Coping skills were enhanced to build distress tolerance skills and emotional regulation. Allowed patient to freely discuss issues without interruption or judgement with unconditional positive regard, active listening skills, and empathy. Provided a safe, confidential environment to facilitate the development of a positive therapeutic relationship and encourage open, honest communication. Assisted patient in processing session content; acknowledged and normalized/addressed, as appropriate, patient’s thoughts, feelings, and concerns by utilizing a person-centered approach in efforts to build appropriate rapport and a positive therapeutic relationship with open and honest communication.   - Diagnoses: see assessment and plan below  - Symptoms: see subjective above  - Goals   - patient: cultivate concentration and focus to improve functional capacity, sustain mood and manageable anxiety levels, nourish creativity   - provider: challenge patterns of living conducive to pathology, strengthen defenses, promote problems solving, restore adaptive functioning and provide symptom relief.  - Treatment plan: continue supportive psychotherapy in subsequent appointments to provide symptom relief; see assessment and plan below for additional details:   - iteration: 1   - progress: partial   - (X)illumination,  (X)contextualization, (working)detection, (working)development, (-)elaboration, (-)refinement  - functional status: good  - mental status exam: as below  - prognosis: good    Psychiatric History:  Diagnoses: ADD, anxiety, depression  Outpatient history: denies  Inpatient history: denies  Medication trials: sertraline (worked okay), bupropion (ineffective 150 mg), buspirone (didn't seem to have an impact; was taking once daily)  Other treatment modalities: has not done therapy in the past  Presenting regimen: atomoxetine 40 mg QD, duloxetine 60 mg QD, hydroxyzine 25 mg HS, trazodone 50 mg HS  Self harm: years ago cut  Suicide attempts: denies     Social History:  Residence: lives in a house with her mother; was living with boyfriend and they had an argument eliciting the change in living for the time being; does not have children  Vocation: [employer], [employer]  Education: working on associate's degree (, 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)  Pertinent developmental history: issues with math growing up; denies abuse hx  Pertinent legal history: denies  Hobbies/interests: reading (historical fiction, gothic horror), art (drawing and painting), music (clarinet and piano)  Mandaeism: denies  Exercise: yes, walks three times weekly  Dietary habits: defer  Sleep hygiene: defer  Social habits: no pertinent issues  Sunlight: no concern for under-exposure  Caffeine intake: coffee in the AM with an energy drink, occasional soda, occasional tea  Hydration habits: not enough water   history: denies    Social History     Socioeconomic History    Marital status: Single   Tobacco Use    Smoking status: Never     Passive exposure: Past    Smokeless tobacco: Never   Vaping Use    Vaping status: Never Used   Substance and Sexual Activity    Alcohol use: Never    Drug use: Never    Sexual activity: Yes     Partners: Male     Birth control/protection: None     Tobacco use counseling/intervention: N/A, patient does  not use tobacco; patient has been counseled with regard to risks of tobacco use.    PHQ-9 Depression Screening  PHQ-9 Total Score:      Little interest or pleasure in doing things?     Feeling down, depressed, or hopeless?     Trouble falling or staying asleep, or sleeping too much?     Feeling tired or having little energy?     Poor appetite or overeating?     Feeling bad about yourself - or that you are a failure or have let yourself or your family down?     Trouble concentrating on things, such as reading the newspaper or watching television?     Moving or speaking so slowly that other people could have noticed? Or the opposite - being so fidgety or restless that you have been moving around a lot more than usual?     Thoughts that you would be better off dead, or of hurting yourself in some way?     PHQ-9 Total Score       Change in PHQ-9 since last measure: N/A (16)    VIOLETTE-7       Change in VIOLETTE-7 since last measure: N/A (19)    Problem List:  Patient Active Problem List   Diagnosis    Stroke    VIOLETTE (generalized anxiety disorder)    History of stroke    Cat bite    Attention deficit hyperactivity disorder (ADHD), combined type    Major depressive disorder, recurrent episode, in partial remission     Allergy:   Allergies   Allergen Reactions    Naproxen Hives and Anaphylaxis      Discontinued Medications:  Medications Discontinued During This Encounter   Medication Reason    amphetamine-dextroamphetamine (ADDERALL) 15 MG tablet        Current Medications:   Current Outpatient Medications   Medication Sig Dispense Refill    DULoxetine (CYMBALTA) 30 MG capsule Take 1 capsule by mouth Daily. 30 capsule 1    DULoxetine (Cymbalta) 60 MG capsule Take 1 capsule by mouth Daily. 90 capsule 3    hydrOXYzine (ATARAX) 25 MG tablet TAKE 1 TABLET BY MOUTH EVERYDAY AT BEDTIME 90 tablet 1    ibuprofen (ADVIL,MOTRIN) 800 MG tablet Take 1 tablet by mouth 2 (Two) Times a Day As Needed.      traZODone (DESYREL) 50 MG tablet Take 1-2  tabs po qhs prn insomnia 90 tablet 3     No current facility-administered medications for this visit.     Past Medical History:  Past Medical History:   Diagnosis Date    Acid reflux     ADHD (attention deficit hyperactivity disorder)     Allergic rhinitis     Anxiety     Arthritis     Depression     Hyperlipidemia     Hypertension     Leg pain     Seasonal allergies     Sinus trouble     Sleep disturbance     Stroke 03/16/2021    Thumb fracture 2012    Right     Past Surgical History:  Past Surgical History:   Procedure Laterality Date    ANKLE SURGERY      EYE SURGERY  1989    FRACTURE SURGERY  1/10/2014    Kneecap reconstruction    KNEE PCL RECONSTRUCTION Left 2014    open ligament     Mental Status Exam:   Appearance: well-groomed, sits upright, age-appropriate, normal habitus  Behavior: calm, cooperative, appropriate in demeanor, appropriate eye-contact  Mood/affect: fair / mood-congruent and appropriate in both range and amplitude  Speech: within expected variance; appropriate rate, appropriate rhythm, appropriate tone; non-pressured  Thought Process: linear, goal-directed; no FOI or ANASTACIA; abstraction intact  Thought Content: coherent, devoid of overt delusions/perceptual disturbances  SI/HI: denies both SI and HI; exhibits future-orientation, self-advocates appropriately, no regular self-harm, no appreciable intent  Memory: no overt deficits  Orientation: oriented to person/place/time/situation  Concentration: appropriate during interview; +subjective deficits  Intellectual capacity: presumptively average  Insight: fair by given history/exam  Judgment: appropriate by given history/exam  Psychomotor: no appreciable latency/retardation/agitation/tremor  Gait: WNL    Review of Systems:  Review of Systems   Constitutional:  Negative for activity change, appetite change, fatigue and unexpected weight change.   Respiratory:  Negative for chest tightness and shortness of breath.    Cardiovascular:  Negative for chest  "pain and palpitations.   Gastrointestinal:  Negative for abdominal pain, diarrhea, nausea and vomiting.   Neurological:  Negative for dizziness, light-headedness and headaches.   Psychiatric/Behavioral:  Negative for agitation and sleep disturbance.      Vital Signs:   /91   Pulse 102   Ht 162.6 cm (64\")   Wt 68.5 kg (151 lb)   BMI 25.92 kg/m²      Lab Results:   Office Visit on 01/22/2024   Component Date Value Ref Range Status    SARS Antigen 01/22/2024 Not Detected  Not Detected, Presumptive Negative Final    Influenza A Antigen JOSÉ 01/22/2024 Not Detected  Not Detected Final    Influenza B Antigen JOSÉ 01/22/2024 Not Detected  Not Detected Final    Internal Control 01/22/2024 Passed  Passed Final    Lot Number 01/22/2024 [id number]   Final    Expiration Date 01/22/2024 8/11/24   Final     EKG Results:  No orders to display     Imaging Results:  No Images in the past 120 days found.    ASSESSMENT AND PLAN:    ICD-10-CM ICD-9-CM   1. Attention deficit hyperactivity disorder (ADHD), combined type  F90.2 314.01   2. Major depressive disorder, recurrent, moderate  F33.1 296.32   3. VIOLETTE (generalized anxiety disorder)  F41.1 300.02   4. Primary insomnia  F51.01 307.42     39 y.o. female who presents today for follow-up regarding ADHD, VIOLETTE, MDD. We have discussed the interval history and the treatment plan below, including potential R/B/SE of the recommended regimen of which the patient demonstrates understanding. Patient is agreeable to call 911 or go to the nearest ER should she become concerned for her own safety and/or the safety of those around her. There are no overt indices of acute tierney/psychosis on exam today. SANDEE reviewed and as expected.    Medication regimen: titrate amphetamine 20 mg BID - continue duloxetine, hydroxyzine, trazodone; patient is advised not to misuse prescribed medications or to use them with any exogenous substances that aren't disclosed to this provider as they may interact with " the regimen to the patient's detriment.   Risk Assessment: protracted risk is low, imminent risk is low - slight interval change. Do note that this is subject to change with the Sikh of new stressors, treatment non-adherence, use of substances, and/or new medical ails.   Monitoring: reviewed labs/imaging as populated above  Therapy: referred  Follow-up: 2 months  Communications: N/A    TREATMENT PLAN/GOALS: challenge patterns of living conducive to symptom burden, implement recommended regimen as above with augmentative, intermittent supportive psychotherapy to reduce symptom burden. Patient acknowledged and verbally consented to continue treatment. The importance of adherence to the recommended treatment and interval follow-up appointments was again emphasized today: patient has good treatment adherence per given history. Patient was today reminded to limit daily caffeine intake, hydrate appropriately, eat healthy and nutritious foods, engage sleep hygiene measures, engage appropriate exposure to sunlight, engage with hobbies in balance with life necessities, and exercise appropriate to their capacity to do so.     Billing: This encounter is of moderate complexity based on number/complexity of problems addressed today and risk of complications/morbidity: 2+ stable chronic illnesses and prescription management.    Parts of this note are electronic transcriptions/translations of spoken language to printed text using the Dragon Dictation system.    Electronically signed by Tejinder Amezcua MD, 06/19/24, [id number]

## 2024-06-19 NOTE — PROGRESS NOTES
Progress Note    Date: 06/27/2024  Time In: 11:00  Time Out: 11:53    Patient Legal Name: Laurel Molina  Patient Age: 39 y.o.    Mode of visit: In person  Location of provider: [address], [place] [address]  Location of patient: Office      CHIEF COMPLAINT: anxiety    Subjective   History of Present Illness   Laurel is a 39 y.o. female who presents today as a follow-up for continued psychotherapy. 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  Patient stated she is going on vacation next week.  Patient shared her medications are working well. Patient advised she feels she has changed her mindset and it has helped with decreasing depression and anxiety.  Patient reported she feels she still needs to improve on her self-talk.  Patient described having a stroke 3 years ago due to job stress. Patient described feeling more confident in her work.  Patient is voluntarily requesting to participate in outpatient therapy at [facility].  Patient rated her anxiety at 1 and depression at 1 today on a 0-10 scale where 0= no symptoms.    History obtained from referring provider's note on 5/6/24:  Psychiatric History:  Diagnoses: ADD, anxiety, depression  Outpatient history: denies  Inpatient history: denies  Medication trials: sertraline (worked okay), bupropion (ineffective 150 mg), buspirone (didn't seem to have an impact; was taking once daily)  Other treatment modalities: has not done therapy in the past  Presenting regimen: atomoxetine 40 mg QD, duloxetine 60 mg QD, hydroxyzine 25 mg HS, trazodone 50 mg HS  Self harm: years ago cut  Suicide attempts: denies    Assessment    Mental Status Exam     Appearance: good hygiene and dressed appropriately for the weather  Behavior: calm  Cooperation:  engaged, cooperative, attentive, and friendly  Eye Contact:  good  Affect:  congruent  Mood: expressive  Speech: responsive  Thought  Process:  organized  Thought Content: appropriate  Suicidal: denies  Homicidal:  denies  Hallucinations:  denies  Memory:  intact  Orientation:  person, place, time, and situation  Reliability:  reliable  Insight:  good  Judgment:  good    Clinical Intervention       ICD-10-CM ICD-9-CM   1. VIOLETTE (generalized anxiety disorder)  F41.1 300.02        Individual psychotherapy was provided utilizing CBT and person-centered techniques to build rapport, encourage expression of thoughts and feelings, support self-esteem, establish new coping skills, assess symptoms, gather history, and provide psychoeducation. Patient was given resources and encouraged to develop positive affirmations to practice daily in order to challenge negative self-talk and boost self-confidence.   Therapist utilized open-ended questions to encourage the development of a positive therapeutic relationship and open communication.  Therapist normalized/validated patient’s thoughts and feelings as appropriate. Challenged patient to use more positive self talk and develop a mantra for positive affirmation. Therapist modeled reframing thoughts about the future to increase positivity. Patient rated her anxiety at 1 and depression at 1 today on a 0-10 scale where 0= no symptoms.    Plan   Plan & Goals     Moving forward, we will continue to build rapport and reinforce and build upon effective coping strategies utilizing CBT and person-centered techniques.     Patient acknowledged and verbally consented to continue working toward resolving current treatment plan goals and was educated on the importance of participation in the therapeutic process.  Patient will remain compliant with medication regimen as prescribed. Discuss any medication side effects, questions or concerns with prescribing provider.  Call 911 or present to the nearest emergency room in an emergency situation.  National Suicide Prevention Lifeline: Call 988. The Lifeline provides 24/7, free and  confidential support for people in distress, prevention and crisis resources.  Crisis Text Line  Text HOME To 446377    Return in about 3 weeks (around 7/18/2024).    ____________________  This document has been electronically signed by Esthela Velasquez LCSW  June 27, 2024 12:21 EDT    Part of this note may be an electronic transcription/translation of spoken language to printed text using the Dragon Dictation System.

## 2024-06-27 ENCOUNTER — OFFICE VISIT (OUTPATIENT)
Dept: BEHAVIORAL HEALTH | Facility: CLINIC | Age: 39
End: 2024-06-27
Payer: COMMERCIAL

## 2024-06-27 DIAGNOSIS — F41.1 GAD (GENERALIZED ANXIETY DISORDER): Primary | ICD-10-CM

## 2024-07-10 ENCOUNTER — HOSPITAL ENCOUNTER (EMERGENCY)
Facility: HOSPITAL | Age: 39
Discharge: HOME | End: 2024-07-10
Attending: EMERGENCY MEDICINE
Payer: COMMERCIAL

## 2024-07-10 ENCOUNTER — APPOINTMENT (OUTPATIENT)
Dept: GENERAL RADIOLOGY | Facility: HOSPITAL | Age: 39
End: 2024-07-10
Payer: COMMERCIAL

## 2024-07-10 VITALS
RESPIRATION RATE: 20 BRPM | HEART RATE: 114 BPM | DIASTOLIC BLOOD PRESSURE: 85 MMHG | OXYGEN SATURATION: 100 % | SYSTOLIC BLOOD PRESSURE: 127 MMHG | TEMPERATURE: 99.9 F

## 2024-07-10 DIAGNOSIS — F41.9 ANXIETY: ICD-10-CM

## 2024-07-10 DIAGNOSIS — R50.9 FEBRILE ILLNESS: Primary | ICD-10-CM

## 2024-07-10 LAB
ALBUMIN SERPL-MCNC: 4.8 G/DL (ref 3.5–5.2)
ALBUMIN/GLOB SERPL: 1.6 G/DL
ALP SERPL-CCNC: 71 U/L (ref 39–117)
ALT SERPL W P-5'-P-CCNC: 14 U/L (ref 1–33)
ANION GAP SERPL CALCULATED.3IONS-SCNC: 11.5 MMOL/L (ref 5–15)
AST SERPL-CCNC: 21 U/L (ref 1–32)
BACTERIA UR QL AUTO: NORMAL /HPF
BASOPHILS # BLD AUTO: 0.04 10*3/MM3 (ref 0–0.2)
BASOPHILS NFR BLD AUTO: 0.3 % (ref 0–1.5)
BILIRUB SERPL-MCNC: 0.6 MG/DL (ref 0–1.2)
BILIRUB UR QL STRIP: NEGATIVE
BUN SERPL-MCNC: 11 MG/DL (ref 6–20)
BUN/CREAT SERPL: 12.5 (ref 7–25)
CALCIUM SPEC-SCNC: 9.4 MG/DL (ref 8.6–10.5)
CHLORIDE SERPL-SCNC: 101 MMOL/L (ref 98–107)
CLARITY UR: CLEAR
CO2 SERPL-SCNC: 23.5 MMOL/L (ref 22–29)
COLOR UR: YELLOW
CREAT SERPL-MCNC: 0.88 MG/DL (ref 0.57–1)
D-LACTATE SERPL-SCNC: 1.4 MMOL/L (ref 0.5–2)
DEPRECATED RDW RBC AUTO: 43.6 FL (ref 37–54)
EGFRCR SERPLBLD CKD-EPI 2021: 84.8 ML/MIN/1.73
EOSINOPHIL # BLD AUTO: 0.09 10*3/MM3 (ref 0–0.4)
EOSINOPHIL NFR BLD AUTO: 0.7 % (ref 0.3–6.2)
ERYTHROCYTE [DISTWIDTH] IN BLOOD BY AUTOMATED COUNT: 13.2 % (ref 12.3–15.4)
GLOBULIN UR ELPH-MCNC: 3 GM/DL
GLUCOSE SERPL-MCNC: 93 MG/DL (ref 65–99)
GLUCOSE UR STRIP-MCNC: NEGATIVE MG/DL
HCT VFR BLD AUTO: 37.4 % (ref 34–46.6)
HGB BLD-MCNC: 12.5 G/DL (ref 12–15.9)
HGB UR QL STRIP.AUTO: NEGATIVE
HOLD SPECIMEN: NORMAL
HYALINE CASTS UR QL AUTO: NORMAL /LPF
IMM GRANULOCYTES # BLD AUTO: 0.05 10*3/MM3 (ref 0–0.05)
IMM GRANULOCYTES NFR BLD AUTO: 0.4 % (ref 0–0.5)
KETONES UR QL STRIP: ABNORMAL
LEUKOCYTE ESTERASE UR QL STRIP.AUTO: ABNORMAL
LYMPHOCYTES # BLD AUTO: 1.1 10*3/MM3 (ref 0.7–3.1)
LYMPHOCYTES NFR BLD AUTO: 8.5 % (ref 19.6–45.3)
MCH RBC QN AUTO: 30 PG (ref 26.6–33)
MCHC RBC AUTO-ENTMCNC: 33.4 G/DL (ref 31.5–35.7)
MCV RBC AUTO: 89.9 FL (ref 79–97)
MONOCYTES # BLD AUTO: 0.97 10*3/MM3 (ref 0.1–0.9)
MONOCYTES NFR BLD AUTO: 7.5 % (ref 5–12)
NEUTROPHILS NFR BLD AUTO: 10.73 10*3/MM3 (ref 1.7–7)
NEUTROPHILS NFR BLD AUTO: 82.6 % (ref 42.7–76)
NITRITE UR QL STRIP: NEGATIVE
NRBC BLD AUTO-RTO: 0 /100 WBC (ref 0–0.2)
PH UR STRIP.AUTO: 6.5 [PH] (ref 5–8)
PLATELET # BLD AUTO: 312 10*3/MM3 (ref 140–450)
PMV BLD AUTO: 10.4 FL (ref 6–12)
POTASSIUM SERPL-SCNC: 4.2 MMOL/L (ref 3.5–5.2)
PROT SERPL-MCNC: 7.8 G/DL (ref 6–8.5)
PROT UR QL STRIP: NEGATIVE
RBC # BLD AUTO: 4.16 10*6/MM3 (ref 3.77–5.28)
RBC # UR STRIP: NORMAL /HPF
REF LAB TEST METHOD: NORMAL
SODIUM SERPL-SCNC: 136 MMOL/L (ref 136–145)
SP GR UR STRIP: 1.03 (ref 1–1.03)
SQUAMOUS #/AREA URNS HPF: NORMAL /HPF
UROBILINOGEN UR QL STRIP: ABNORMAL
WBC # UR STRIP: NORMAL /HPF
WBC NRBC COR # BLD AUTO: 12.98 10*3/MM3 (ref 3.4–10.8)

## 2024-07-10 PROCEDURE — 99283 EMERGENCY DEPT VISIT LOW MDM: CPT

## 2024-07-10 PROCEDURE — 87468 ANAPLSMA PHGCYTOPHLM AMP PRB: CPT | Performed by: NURSE PRACTITIONER

## 2024-07-10 PROCEDURE — 86790 VIRUS ANTIBODY NOS: CPT

## 2024-07-10 PROCEDURE — 36415 COLL VENOUS BLD VENIPUNCTURE: CPT

## 2024-07-10 PROCEDURE — 87040 BLOOD CULTURE FOR BACTERIA: CPT | Performed by: EMERGENCY MEDICINE

## 2024-07-10 PROCEDURE — 80053 COMPREHEN METABOLIC PANEL: CPT

## 2024-07-10 PROCEDURE — 87484 EHRLICHA CHAFFEENSIS AMP PRB: CPT | Performed by: NURSE PRACTITIONER

## 2024-07-10 PROCEDURE — 96360 HYDRATION IV INFUSION INIT: CPT

## 2024-07-10 PROCEDURE — 86618 LYME DISEASE ANTIBODY: CPT | Performed by: NURSE PRACTITIONER

## 2024-07-10 PROCEDURE — 71045 X-RAY EXAM CHEST 1 VIEW: CPT

## 2024-07-10 PROCEDURE — 85025 COMPLETE CBC W/AUTO DIFF WBC: CPT

## 2024-07-10 PROCEDURE — 87798 DETECT AGENT NOS DNA AMP: CPT | Performed by: NURSE PRACTITIONER

## 2024-07-10 PROCEDURE — 25810000003 SODIUM CHLORIDE 0.9 % SOLUTION: Performed by: NURSE PRACTITIONER

## 2024-07-10 PROCEDURE — 83605 ASSAY OF LACTIC ACID: CPT | Performed by: EMERGENCY MEDICINE

## 2024-07-10 PROCEDURE — 81001 URINALYSIS AUTO W/SCOPE: CPT | Performed by: NURSE PRACTITIONER

## 2024-07-10 RX ORDER — DOXYCYCLINE 100 MG/1
100 CAPSULE ORAL 2 TIMES DAILY
Qty: 28 CAPSULE | Refills: 0 | Status: SHIPPED | OUTPATIENT
Start: 2024-07-10 | End: 2024-07-24

## 2024-07-10 RX ORDER — ACETAMINOPHEN 325 MG/1
975 TABLET ORAL ONCE
Status: COMPLETED | OUTPATIENT
Start: 2024-07-10 | End: 2024-07-10

## 2024-07-10 RX ORDER — SODIUM CHLORIDE 0.9 % (FLUSH) 0.9 %
10 SYRINGE (ML) INJECTION AS NEEDED
Status: DISCONTINUED | OUTPATIENT
Start: 2024-07-10 | End: 2024-07-11 | Stop reason: HOSPADM

## 2024-07-10 RX ORDER — DOXYCYCLINE 100 MG/1
100 CAPSULE ORAL ONCE
Status: COMPLETED | OUTPATIENT
Start: 2024-07-10 | End: 2024-07-10

## 2024-07-10 RX ADMIN — ACETAMINOPHEN 975 MG: 325 TABLET ORAL at 21:52

## 2024-07-10 RX ADMIN — SODIUM CHLORIDE 1000 ML: 9 INJECTION, SOLUTION INTRAVENOUS at 21:49

## 2024-07-10 RX ADMIN — DOXYCYCLINE 100 MG: 100 CAPSULE ORAL at 22:40

## 2024-07-10 NOTE — PROGRESS NOTES
Progress Note    Date: 07/18/2024  Time In: 11:04  Time Out: 11:54    Patient Legal Name: Laurel Molina  Patient Age: 39 y.o.    Mode of visit: In person  Location of provider: [address], [place] [address]  Location of patient: Office      CHIEF COMPLAINT: anxiety    Subjective   History of Present Illness   Laurel is a 39 y.o. female who presents today as a follow-up for continued psychotherapy. Patient running a few minutes late for appointment today. Patient reported vacation went well but she did get sick from the mosquitoes there.  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  Patient shared she has been staying busy. Patient advised she has been using affirmations and her mantra to build confidence.  Patient reported she feels she compares herself to others and it makes her feel as if she is lagging behind.  Patient shared she has not been worrying as much since starting her externship. Patient described being concerned about her weight stating she weighs more than she ever has. Patient explained she would like to work on losing weight and identified some small steps she can take to do this. Patient stated work is going well and she is.learning a lot. Patient is voluntarily requesting to participate in outpatient therapy at [facility].  Patient rated her anxiety at 0 and depression at 0 today on a 0-10 scale where 0= no symptoms.     History obtained from referring provider's note on 5/6/24:  Psychiatric History:  Diagnoses: ADD, anxiety, depression  Outpatient history: denies  Inpatient history: denies  Medication trials: sertraline (worked okay), bupropion (ineffective 150 mg), buspirone (didn't seem to have an impact; was taking once daily)  Other treatment modalities: has not done therapy in the past  Presenting regimen: atomoxetine 40 mg QD, duloxetine 60 mg QD, hydroxyzine 25 mg HS, trazodone 50 mg HS  Self harm: years ago cut  Suicide  attempts: denies    Assessment    Mental Status Exam     Appearance: good hygiene and dressed appropriately for the weather  Behavior: calm  Cooperation:  engaged, cooperative, attentive, and friendly  Eye Contact:  good  Affect:  congruent  Mood: expressive  Speech: talkative  Thought Process:  organized  Thought Content: appropriate  Suicidal: denies  Homicidal:  denies  Hallucinations:  denies  Memory:  intact  Orientation:  person, place, time, and situation  Reliability:  reliable  Insight:  good  Judgment:  good    Clinical Intervention       ICD-10-CM ICD-9-CM   1. VIOLETTE (generalized anxiety disorder)  F41.1 300.02        Individual psychotherapy was provided utilizing CBT, SFT, and person-centered techniques to build rapport, encourage expression of thoughts and feelings, support self-esteem, establish new coping skills, assess symptoms, gather history, and provide psychoeducation.  Therapist utilized open-ended questions to encourage the development of a positive therapeutic relationship and open communication.  Therapist normalized/validated patient’s thoughts and feelings as appropriate. Reviewed personal bill of rights handout with patient. Explored strategies for weight management. Patient rated her anxiety at 0 and depression at 0 today on a 0-10 scale where 0= no symptoms.     Plan   Plan & Goals     Moving forward, we will continue to build rapport and reinforce and build upon effective coping strategies utilizing CBT and person-centered techniques.    Patient acknowledged and verbally consented to continue working toward resolving current treatment plan goals and was educated on the importance of participation in the therapeutic process.  Patient will remain compliant with medication regimen as prescribed. Discuss any medication side effects, questions or concerns with prescribing provider.  Call 911 or present to the nearest emergency room in an emergency situation.  National Suicide Prevention  Lifeline: Call 988. The Lifeline provides 24/7, free and confidential support for people in distress, prevention and crisis resources.  Crisis Text Line  Text HOME To 692107    Return in about 3 weeks (around 8/8/2024).    ____________________  This document has been electronically signed by Esthela Velasquez LCSW  July 18, 2024 12:08 EDT    Part of this note may be an electronic transcription/translation of spoken language to printed text using the Dragon Dictation System.

## 2024-07-11 RX ORDER — DULOXETIN HYDROCHLORIDE 60 MG/1
60 CAPSULE, DELAYED RELEASE ORAL DAILY
Qty: 90 CAPSULE | Refills: 3 | Status: SHIPPED | OUTPATIENT
Start: 2024-07-11

## 2024-07-11 NOTE — ED PROVIDER NOTES
Time: 8:26 PM EDT  Date of encounter:  7/10/2024  Independent Historian/Clinical History and Information was obtained by:   Patient    History is limited by: N/A    Chief Complaint   Patient presents with    Fever    Cough    Headache    Generalized Body Aches         History of Present Illness:  Patient is a 39 y.o. year old female who presents to the emergency department for evaluation of cough, fever, body aches, and headache that started yesterday.  Patient states that she was in [place] from last Monday to this past Sunday.  States that she has had mosquito bites in [place].  [place] in [place] way [place].  She did see warnings of dengue fever.  She saw her PCP today who recommended for her to come to the ED for further evaluation.  Her COVID and flu swabs were negative at office. (Triage Provider: Rashawn Torres PA-C).  No known sick contacts.  Patient states that she has not had any tick bites that she is aware of      Patient Care Team  Primary Care Provider: Rula Myers APRN    Past Medical History:     Allergies   Allergen Reactions    Naproxen Hives and Anaphylaxis     Past Medical History:   Diagnosis Date    Acid reflux     ADHD (attention deficit hyperactivity disorder)     Allergic rhinitis     Anxiety     Arthritis     Depression     Hyperlipidemia     Hypertension     Leg pain     Seasonal allergies     Sinus trouble     Sleep disturbance     Stroke 03/16/2021    Thumb fracture 2012    Right     Past Surgical History:   Procedure Laterality Date    ANKLE SURGERY      EYE SURGERY  1989    FRACTURE SURGERY  1/10/2014    Kneecap reconstruction    KNEE PCL RECONSTRUCTION Left 2014    open ligament     Family History   Problem Relation Age of Onset    Anxiety disorder Mother     Arthritis Mother     Diabetes Mother     Arthritis Father     Heart disease Father     Diabetes Father     COPD Father     Hyperlipidemia Father     Other Father          Idiopathic Pulmonary Fibrosis    No Known Problems Sister     Arthritis Brother     Heart disease Brother     Diabetes Brother     No Known Problems Maternal Aunt     No Known Problems Paternal Aunt     No Known Problems Maternal Uncle     No Known Problems Paternal Uncle     No Known Problems Maternal Grandfather     No Known Problems Maternal Grandmother     No Known Problems Paternal Grandfather     No Known Problems Paternal Grandmother     No Known Problems Cousin     ADD / ADHD Neg Hx     Alcohol abuse Neg Hx     Bipolar disorder Neg Hx     Dementia Neg Hx     Depression Neg Hx     Drug abuse Neg Hx     OCD Neg Hx     Paranoid behavior Neg Hx     Schizophrenia Neg Hx     Seizures Neg Hx     Self-Injurious Behavior  Neg Hx     Suicide Attempts Neg Hx        Home Medications:  Prior to Admission medications    Medication Sig Start Date End Date Taking? Authorizing Provider   amphetamine-dextroamphetamine (ADDERALL) 20 MG tablet Take 1 tablet by mouth 2 (Two) Times a Day. 6/19/24   Tejinder Amezcua MD   amphetamine-dextroamphetamine (ADDERALL) 20 MG tablet Take 1 tablet by mouth 2 (Two) Times a Day. 7/18/24   Tejinder Amezcua MD   DULoxetine (CYMBALTA) 30 MG capsule Take 1 capsule by mouth Daily. 6/19/24   Tejinder Amezcua MD   DULoxetine (Cymbalta) 60 MG capsule Take 1 capsule by mouth Daily. 8/7/23   Rula Myers APRN   hydrOXYzine (ATARAX) 25 MG tablet Take 1 tablet by mouth every night at bedtime. 6/19/24   Tejinder Amezcua MD   ibuprofen (ADVIL,MOTRIN) 800 MG tablet Take 1 tablet by mouth 2 (Two) Times a Day As Needed.    Provider, MD Waleska   traZODone (DESYREL) 50 MG tablet Take 1-2 tabs po qhs prn insomnia 8/7/23   Rula Myers APRN        Social History:   Social History     Tobacco Use    Smoking status: Never     Passive exposure: Past    Smokeless tobacco: Never   Vaping Use    Vaping status: Never Used   Substance Use Topics    Alcohol use: Never    Drug use:  Never         Review of Systems:  Review of Systems   Constitutional:  Positive for fatigue and fever (101 highest). Negative for chills.   HENT:  Negative for congestion, ear pain, rhinorrhea, sinus pressure, sinus pain, sneezing and sore throat.    Eyes:  Negative for pain.   Respiratory:  Positive for cough (Dry). Negative for chest tightness, shortness of breath and wheezing.    Cardiovascular:  Negative for chest pain.   Gastrointestinal:  Negative for abdominal pain, diarrhea, nausea and vomiting.   Genitourinary:  Negative for dysuria, flank pain and hematuria.   Musculoskeletal:  Positive for myalgias. Negative for joint swelling.   Skin:  Negative for pallor and rash.   Neurological:  Positive for headaches. Negative for seizures.   Hematological: Negative.    Psychiatric/Behavioral: Negative.     All other systems reviewed and are negative.       Physical Exam:  /85 (BP Location: Left arm, Patient Position: Sitting)   Pulse 114   Temp 99.9 °F (37.7 °C) (Oral)   Resp 20   LMP 07/06/2024 (Exact Date)   SpO2 100%         Physical Exam  Vitals and nursing note reviewed.   Constitutional:       General: She is not in acute distress.     Appearance: Normal appearance. She is not toxic-appearing.   HENT:      Head: Normocephalic and atraumatic.      Right Ear: Tympanic membrane, ear canal and external ear normal.      Left Ear: Tympanic membrane, ear canal and external ear normal.      Nose: Nose normal.      Mouth/Throat:      Mouth: Mucous membranes are moist.   Eyes:      General: No scleral icterus.     Extraocular Movements: Extraocular movements intact.      Conjunctiva/sclera: Conjunctivae normal.      Pupils: Pupils are equal, round, and reactive to light.   Cardiovascular:      Rate and Rhythm: Regular rhythm. Tachycardia present.      Pulses: Normal pulses.      Heart sounds: Normal heart sounds.   Pulmonary:      Effort: Pulmonary effort is normal. No respiratory distress.      Breath  sounds: Normal breath sounds.   Abdominal:      General: Bowel sounds are normal.      Palpations: Abdomen is soft.      Tenderness: There is no abdominal tenderness. There is no right CVA tenderness or left CVA tenderness.   Musculoskeletal:         General: Normal range of motion.      Cervical back: Normal range of motion and neck supple.   Lymphadenopathy:      Cervical: No cervical adenopathy.   Skin:     General: Skin is warm and dry.      Findings: No rash.   Neurological:      Mental Status: She is alert and oriented to person, place, and time.      Sensory: No sensory deficit.      Motor: No weakness.   Psychiatric:         Mood and Affect: Mood normal.         Behavior: Behavior normal.              Medical Decision Making:      Comorbidities that affect care:    Hypertension    External Notes reviewed:    Previous Clinic Note: Patient seen in urgent care today for febrile illness and history of recent travel and mosquito bite.  She had negative COVID and flu swabs      The following orders were placed and all results were independently analyzed by me:  Orders Placed This Encounter   Procedures    Blood Culture - Blood,    Blood Culture - Blood,    XR Chest 1 View    Comprehensive Metabolic Panel    CBC Auto Differential    Dengue Fever Virus Ab    Ehrlichia Profile DNA PCR    Rickettsia Species DNA, Real-Time PCR    Lyme Disease Total Antibody With Reflex to Immunoassay    Lactic Acid, Plasma    [place] Draw    Urinalysis With Culture If Indicated - Urine, Clean Catch    Urinalysis, Microscopic Only - Urine, Clean Catch    Undress & Gown    Continuous Pulse Oximetry    Vital Signs    Repeat vitals for dc  Misc Nursing Order (Specify)    Oxygen Therapy- Nasal Cannula; Titrate 1-6 LPM Per SpO2; 90 - 95%    Insert Peripheral IV    CBC & Differential    Green Top (Gel)    Lavender Top    Gold Top - SST    Light Blue Top    Extra Tubes    Gold Top - SST       Medications Given in the Emergency  Department:  Medications   sodium chloride 0.9 % flush 10 mL (has no administration in time range)   sodium chloride 0.9 % bolus 1,000 mL (1,000 mL Intravenous New Bag 7/10/24 2149)   doxycycline (MONODOX) capsule 100 mg (has no administration in time range)   acetaminophen (TYLENOL) tablet 975 mg (975 mg Oral Given 7/10/24 2152)        ED Course:    The patient was initially evaluated in the triage area where orders were placed. The patient was later dispositioned by MICHA Davis.      The patient was advised to stay for completion of workup which includes but is not limited to communication of labs and radiological results, reassessment and plan. The patient was advised that leaving prior to disposition by a provider could result in critical findings that are not communicated to the patient.     ED Course as of 07/10/24 2235   Wed Jul 10, 2024   2029 PROVIDER IN TRIAGE  Patient was evaluated by Rashawn celis PA-C. Orders were placed and awaiting final results and disposition.   [MV]   2117 XR Chest 1 View  No acute findings [DS]   2224 Patient reports improvement in headache and bodyaches after meds and fluids [DS]      ED Course User Index  [DS] Deborah Dowd APRN  [MV] Rashawn Torres PA       Labs:    Lab Results (last 24 hours)       Procedure Component Value Units Date/Time    Covid-19 + Flu A&B AG, Veritor [[id number]]  (Normal) Collected: 07/10/24 1910    Specimen: Swab Updated: 07/10/24 1910     SARS Antigen Not Detected     Influenza A Antigen JOSÉ Not Detected     Influenza B Antigen JOSÉ Not Detected     Internal Control Passed     Lot Number [id number]     Expiration Date 2/5/2025    CBC & Differential [[id number]]  (Abnormal) Collected: 07/10/24 2033    Specimen: Blood from Arm, Right Updated: 07/10/24 2042    Narrative:      The following orders were created for panel order CBC & Differential.  Procedure                               Abnormality         Status                     ---------                                -----------         ------                     CBC Auto Differential[[id number]]        Abnormal            Final result                 Please view results for these tests on the individual orders.    Comprehensive Metabolic Panel [[id number]] Collected: 07/10/24 2033    Specimen: Blood from Arm, Right Updated: 07/10/24 2109     Glucose 93 mg/dL      BUN 11 mg/dL      Creatinine 0.88 mg/dL      Sodium 136 mmol/L      Potassium 4.2 mmol/L      Chloride 101 mmol/L      CO2 23.5 mmol/L      Calcium 9.4 mg/dL      Total Protein 7.8 g/dL      Albumin 4.8 g/dL      ALT (SGPT) 14 U/L      AST (SGOT) 21 U/L      Alkaline Phosphatase 71 U/L      Total Bilirubin 0.6 mg/dL      Globulin 3.0 gm/dL      A/G Ratio 1.6 g/dL      BUN/Creatinine Ratio 12.5     Anion Gap 11.5 mmol/L      eGFR 84.8 mL/min/1.73     Narrative:      GFR Normal >60  Chronic Kidney Disease <60  Kidney Failure <15      CBC Auto Differential [[id number]]  (Abnormal) Collected: 07/10/24 2033    Specimen: Blood from Arm, Right Updated: 07/10/24 2042     WBC 12.98 10*3/mm3      RBC 4.16 10*6/mm3      Hemoglobin 12.5 g/dL      Hematocrit 37.4 %      MCV 89.9 fL      MCH 30.0 pg      MCHC 33.4 g/dL      RDW 13.2 %      RDW-SD 43.6 fl      MPV 10.4 fL      Platelets 312 10*3/mm3      Neutrophil % 82.6 %      Lymphocyte % 8.5 %      Monocyte % 7.5 %      Eosinophil % 0.7 %      Basophil % 0.3 %      Immature Grans % 0.4 %      Neutrophils, Absolute 10.73 10*3/mm3      Lymphocytes, Absolute 1.10 10*3/mm3      Monocytes, Absolute 0.97 10*3/mm3      Eosinophils, Absolute 0.09 10*3/mm3      Basophils, Absolute 0.04 10*3/mm3      Immature Grans, Absolute 0.05 10*3/mm3      nRBC 0.0 /100 WBC     Dengue Fever Virus Ab [[id number]] Collected: 07/10/24 2033    Specimen: Blood from Arm, Right Updated: 07/10/24 2036    Lactic Acid, Plasma [[id number]]  (Normal) Collected: 07/10/24 2139    Specimen: Blood Updated: 07/10/24 2207     Lactate 1.4 mmol/L     Blood  Culture - Blood, Arm, Left [[id number]] Collected: 07/10/24 2139    Specimen: Blood from Arm, Left Updated: 07/10/24 2148    Blood Culture - Blood, Arm, Left [[id number]] Collected: 07/10/24 2139    Specimen: Blood from Arm, Left Updated: 07/10/24 2149    Ehrlichia Profile DNA PCR [[id number]] Collected: 07/10/24 2142    Specimen: Blood Updated: 07/10/24 2149    Rickettsia Species DNA, Real-Time PCR [[id number]] Collected: 07/10/24 2142    Specimen: Blood Updated: 07/10/24 2149    Lyme Disease Total Antibody With Reflex to Immunoassay [[id number]] Collected: 07/10/24 2142    Specimen: Blood Updated: 07/10/24 2149    Urinalysis With Culture If Indicated - Urine, Clean Catch [[id number]]  (Abnormal) Collected: 07/10/24 2145    Specimen: Urine, Clean Catch Updated: 07/10/24 2201     Color, UA Yellow     Appearance, UA Clear     pH, UA 6.5     Specific Gravity, UA 1.026     Glucose, UA Negative     Ketones, UA 40 mg/dL (2+)     Bilirubin, UA Negative     Blood, UA Negative     Protein, UA Negative     Leuk Esterase, UA Trace     Nitrite, UA Negative     Urobilinogen, UA 1.0 E.U./dL    Narrative:      In absence of clinical symptoms, the presence of pyuria, bacteria, and/or nitrites on the urinalysis result does not correlate with infection.    Urinalysis, Microscopic Only - Urine, Clean Catch [[id number]] Collected: 07/10/24 2145    Specimen: Urine, Clean Catch Updated: 07/10/24 2201     RBC, UA 0-2 /HPF      WBC, UA 0-2 /HPF      Comment: Urine culture not indicated.        Bacteria, UA None Seen /HPF      Squamous Epithelial Cells, UA 0-2 /HPF      Hyaline Casts, UA 0-2 /LPF      Methodology Automated Microscopy             Imaging:    XR Chest 1 View    Result Date: 7/10/2024  XR CHEST 1 VW Date of Exam: 7/10/2024 8:54 PM EDT Indication: cough, fever, congestion Comparison: None available. Findings: Cardiac and mediastinal contours are normal. Pulmonary vascularity is normal. The lungs are clear. No pneumothorax.     No  acute cardiopulmonary findings. Electronically Signed: Rangel Castellanos MD  7/10/2024 9:08 PM EDT  Workstation ID: [id number]       Differential Diagnosis and Discussion:      Fever: Based on the complaint of fever, differential diagnosis includes but is not limited to meningitis, pneumonia, pyelonephritis, acute uti,  systemic immune response syndrome, sepsis, viral syndrome, fungal infection, tick born illness and other bacterial infections.    All labs were reviewed and interpreted by me.    MDM  Number of Diagnoses or Management Options  Febrile illness  Diagnosis management comments: I have explained the patient´s condition, diagnoses and treatment plan based on the information available to me at this time. I have answered questions and addressed any concerns. The patient has a good  understanding of the patient´s diagnosis, condition, and treatment plan as can be expected at this point. The vital signs have been stable. The patient´s condition is stable and appropriate for discharge from the emergency department.      The patient will pursue further outpatient evaluation with the primary care physician or other designated or consulting physician as outlined in the discharge instructions. They are agreeable to this plan of care and follow-up instructions have been explained in detail. The patient has received these instructions in written format and have expressed an understanding of the discharge instructions. The patient is aware that any significant change in condition or worsening of symptoms should prompt an immediate return to this or the closest emergency department or call to 911.       Amount and/or Complexity of Data Reviewed  Clinical lab tests: reviewed and ordered  Tests in the radiology section of CPT®: reviewed and ordered  Tests in the medicine section of CPT®: ordered and reviewed    Risk of Complications, Morbidity, and/or Mortality  Presenting problems: moderate  Diagnostic procedures:  low  Management options: low    Patient Progress  Patient progress: stable         Patient Care Considerations:    SEPSIS IS NOT PRESENT IN THE EMERGENCY DEPARTMENT: Patient meets SIRS criteria in the emergency department however the patient does not have a known source of bacterial infection to confirm the diagnosis of sepsis.        Consultants/Shared Management Plan:    None    Social Determinants of Health:    Patient is independent, reliable, and has access to care.       Disposition and Care Coordination:    Discharged: I considered escalation of care by admitting this patient to the hospital, however acute abnormalities noted on any testing patient will follow-up outpatient    I have explained the patient´s condition, diagnoses and treatment plan based on the information available to me at this time. I have answered questions and addressed any concerns. The patient has a good  understanding of the patient´s diagnosis, condition, and treatment plan as can be expected at this point. The vital signs have been stable. The patient´s condition is stable and appropriate for discharge from the emergency department.      The patient will pursue further outpatient evaluation with the primary care physician or other designated or consulting physician as outlined in the discharge instructions. They are agreeable to this plan of care and follow-up instructions have been explained in detail. The patient has received these instructions in written format and has expressed an understanding of the discharge instructions. The patient is aware that any significant change in condition or worsening of symptoms should prompt an immediate return to this or the closest emergency department or call to 911.  I have explained discharge medications and the need for follow up with the patient/caretakers. This was also printed in the discharge instructions. Patient was discharged with the following medications and follow up:       Medication List        New Prescriptions      doxycycline 100 MG capsule  Commonly known as: MONODOX  Take 1 capsule by mouth 2 (Two) Times a Day for 14 days.               Where to Get Your Medications        These medications were sent to [facility]/pharmacy #[id number] - [place], [place] - [address] - [phone]  - [phone] FX  [address] [place] [address]      Hours: 24-hours Phone: [phone]   doxycycline 100 MG capsule      Rula Myers APRN  [address] [place] [id number] [phone]    In 2 days  For lab results       Final diagnoses:   Febrile illness        ED Disposition       ED Disposition   Discharge    Condition   Stable    Comment   --               This medical record created using voice recognition software.             Deborah Dowd APRN  07/10/24 1217

## 2024-07-11 NOTE — DISCHARGE INSTRUCTIONS
Follow-up with your PCP for reevaluation in 48 hours.  [facility]    Take doxycycline as prophylaxis for tickborne illnesses as we discussed until the results come back    Rest.  Drink plenty of fluids.  Tylenol for pain    Return for new or worsening symptoms

## 2024-07-12 LAB — B BURGDOR IGG+IGM SER QL IA: NEGATIVE

## 2024-07-15 LAB
BACTERIA SPEC AEROBE CULT: NORMAL
BACTERIA SPEC AEROBE CULT: NORMAL
RICKETTSIA RICKETTSII DNA, RT: NOT DETECTED

## 2024-07-16 DIAGNOSIS — F51.01 PRIMARY INSOMNIA: ICD-10-CM

## 2024-07-16 LAB
A PHAGOCYTOPH DNA BLD QL NAA+PROBE: NEGATIVE
E CHAFFEENSIS DNA BLD QL NAA+PROBE: NEGATIVE

## 2024-07-16 RX ORDER — TRAZODONE HYDROCHLORIDE 50 MG/1
TABLET ORAL
Qty: 90 TABLET | Refills: 3 | Status: SHIPPED | OUTPATIENT
Start: 2024-07-16

## 2024-07-18 ENCOUNTER — OFFICE VISIT (OUTPATIENT)
Dept: BEHAVIORAL HEALTH | Facility: CLINIC | Age: 39
End: 2024-07-18
Payer: COMMERCIAL

## 2024-07-18 DIAGNOSIS — F41.1 GAD (GENERALIZED ANXIETY DISORDER): Primary | ICD-10-CM

## 2024-07-26 NOTE — PROGRESS NOTES
"Progress Note    Date: 08/08/2024  Time In: 11:00  Time Out: 11:53    Patient Legal Name: Laurel Molina  Patient Age: 39 y.o.    Mode of visit: In person  Location of provider: [address], [place] [address]  Location of patient: Office      CHIEF COMPLAINT: anxiety    Subjective   History of Present Illness   Laurel is a 39 y.o. female who presents today as a follow-up for continued psychotherapy. Patient reported she has been doing well and keeping busy.  Patient stated her anxiety is \"not bad right now.\"  Patient shared things are going well with school and work. Patient advised she is preparing for a trip to [place].  Patient reported she needs to work on being more positive.  Patient shared she's been used to bad things happening and has a hard time accepting when good things happen. Patient described she tries to be perfect and gets upset with herself when she is not. Patient advised she is doing better at making healthier choices and has lost a few pounds.  Patient is voluntarily requesting to participate in outpatient therapy at [facility].      History obtained from referring provider's note on 5/6/24:  Psychiatric History:  Diagnoses: ADD, anxiety, depression  Outpatient history: denies  Inpatient history: denies  Medication trials: sertraline (worked okay), bupropion (ineffective 150 mg), buspirone (didn't seem to have an impact; was taking once daily)  Other treatment modalities: has not done therapy in the past  Presenting regimen: atomoxetine 40 mg QD, duloxetine 60 mg QD, hydroxyzine 25 mg HS, trazodone 50 mg HS  Self harm: years ago cut  Suicide attempts: denies    Assessment    Mental Status Exam     Appearance: good hygiene and dressed appropriately for the weather  Behavior: calm  Cooperation:  engaged, cooperative, attentive, and friendly  Eye Contact:  good  Affect:  bright  Mood: expressive  Speech: responsive  Thought Process:  organized  Thought " Content: appropriate  Suicidal: denies  Homicidal:  denies  Hallucinations:  denies  Memory:  intact  Orientation:  person, place, time, and situation  Reliability:  reliable  Insight:  good  Judgment:  good    Clinical Intervention       ICD-10-CM ICD-9-CM   1. VIOLETTE (generalized anxiety disorder)  F41.1 300.02        Individual psychotherapy was provided utilizing CBT and person-centered techniques to build rapport, encourage expression of thoughts and feelings, support self-esteem, establish new coping skills, assess symptoms, challenge negative thinking patterns, recognize cognitive distortions, and provide psychoeducation.  Therapist utilized open-ended questions to encourage the development of a positive therapeutic relationship and open communication.  Therapist normalized/validated patient’s thoughts and feelings as appropriate. Therapeutic interventions included identifying and challenging cognitive distortions.and developing/using positive self-talk to challenge negative thoughts.     Plan   Plan & Goals     Moving forward, we will continue to build rapport and reinforce and build upon effective coping strategies utilizing CBT and person-centered techniques.    Patient acknowledged and verbally consented to continue working toward resolving current treatment plan goals and was educated on the importance of participation in the therapeutic process.  Patient will remain compliant with medication regimen as prescribed. Discuss any medication side effects, questions or concerns with prescribing provider.  Call 911 or present to the nearest emergency room in an emergency situation.  National Suicide Prevention Lifeline: Call 988. The Lifeline provides 24/7, free and confidential support for people in distress, prevention and crisis resources.  Crisis Text Line  Text HOME To 508783    Return in about 4 weeks (around 9/5/2024).    ____________________  This document has been electronically signed by Esthela Velasquez  GODWINW  August 8, 2024 12:07 EDT    Part of this note may be an electronic transcription/translation of spoken language to printed text using the Dragon Dictation System.

## 2024-07-26 NOTE — PROGRESS NOTES
Chief Complaint    Annual Exam  Annual Exam    Isak Molina is a 39 y.o. female who presents to [facility]    Annual Exam    History of Present Illness  The patient is a 39-year-old female who presents for routine checkup.    She recently returned from a vacation in [place], during which she developed a fever. Despite undergoing several tests, some results are still pending. She attributes the fever to mosquito bites, as there was a warning in [place] that caused her illness. She denies any tick bites.     Her last dental exam was a few years ago and her last eye exam was last year.     Her anxiety is well-managed, and she reports feeling better on Adderall. She continues to take Cymbalta, which was recently increased from 30 mg to 60 mg, and trazodone for sleep. Adderall helps her focus more during schoolwork, and she no longer experiences distractions at work. She is in a relationship with her boyfriend for a year.     She has a history of stroke, but does not recall being told not to take any medication by a neurologist. She was advised to take aspirin in the emergency room, which she discontinued for a while. She denies experiencing chest pain or shortness of breath. Her bowel and bladder functions are normal. She has not had any fevers since her trip to [place].    FAMILY HISTORY  She denies any family history of colon cancer.    ALLERGIES  She had hives with NAPROXEN.        PHQ-2 Total Score:     PHQ-9 Total Score:          Review of Systems   Constitutional:  Negative for chills, fatigue and fever.   Respiratory:  Negative for cough and shortness of breath.    Cardiovascular:  Negative for chest pain and palpitations.   Gastrointestinal:  Negative for constipation, diarrhea, nausea and vomiting.   Musculoskeletal:  Negative for back pain and neck pain.   Skin:  Negative for rash.   Neurological:  Negative for dizziness and headaches.          Medical  History: has a past medical history of Acid reflux, ADHD (attention deficit hyperactivity disorder), Allergic rhinitis, Anxiety, Arthritis, Depression, Hyperlipidemia, Hypertension, Leg pain, Seasonal allergies, Sinus trouble, Sleep disturbance, Stroke (03/16/2021), and Thumb fracture (2012).     Surgical History: has a past surgical history that includes Ankle surgery; Posterior cruciate ligament repair (Left, 2014); Eye surgery (1989); and Fracture surgery (1/10/2014).     Family History: family history includes Anxiety disorder in her mother; Arthritis in her brother, father, and mother; COPD in her father; Diabetes in her brother, father, and mother; Heart disease in her brother and father; Hyperlipidemia in her father; No Known Problems in her cousin, maternal aunt, maternal grandfather, maternal grandmother, maternal uncle, paternal aunt, paternal grandfather, paternal grandmother, paternal uncle, and sister; Other in her father.     Social History: reports that she has never smoked. She has been exposed to tobacco smoke. She has never used smokeless tobacco. She reports that she does not drink alcohol and does not use drugs.    Allergies: Naproxen      Health Maintenance Due   Topic Date Due    MAMMOGRAM  Never done    ANNUAL PHYSICAL  Never done    COVID-19 Vaccine (5 - 2023-24 season) 09/01/2023    LIPID PANEL  06/06/2024    INFLUENZA VACCINE  08/01/2024            Current Outpatient Medications:     amphetamine-dextroamphetamine (ADDERALL) 20 MG tablet, Take 1 tablet by mouth 2 (Two) Times a Day., Disp: 60 tablet, Rfl: 0    DULoxetine (CYMBALTA) 30 MG capsule, Take 1 capsule by mouth Daily., Disp: 90 capsule, Rfl: 0    DULoxetine (CYMBALTA) 60 MG capsule, TAKE 1 CAPSULE BY MOUTH EVERY DAY, Disp: 90 capsule, Rfl: 3    hydrOXYzine (ATARAX) 25 MG tablet, Take 1 tablet by mouth every night at bedtime., Disp: 90 tablet, Rfl: 0    ibuprofen (ADVIL,MOTRIN) 800 MG tablet, Take 1 tablet by mouth 2 (Two) Times a Day  "As Needed., Disp: , Rfl:     traZODone (DESYREL) 50 MG tablet, TAKE 1 TO 2 TABLETS BY MOUTH AT BEDTIME AS NEEDED FOR INSOMNIA, Disp: 90 tablet, Rfl: 3    aspirin 81 MG EC tablet, Take 1 tablet by mouth Daily., Disp: 90 tablet, Rfl: 1      Immunization History   Administered Date(s) Administered    COVID-19 (PFIZER) Purple Cap Monovalent 06/25/2021, 06/25/2021, 07/16/2021, 07/16/2021    Immune Globulin IM 03/16/2023    RABIES IM 03/30/2023    Rabies 03/16/2023, 03/19/2023, 03/23/2023, 03/30/2023    Td (TDVAX) 06/12/1998    Tdap 03/16/2023         Objective       Vitals:    08/07/24 1033   BP: 120/84   Pulse: 116   Temp: 97.8 °F (36.6 °C)   TempSrc: Temporal   SpO2: 99%   Weight: 66.9 kg (147 lb 6.4 oz)   Height: 162.6 cm (64.02\")      Body mass index is 25.29 kg/m².   Wt Readings from Last 3 Encounters:   08/07/24 66.9 kg (147 lb 6.4 oz)   07/10/24 68.7 kg (151 lb 8 oz)   06/19/24 68.5 kg (151 lb)      BP Readings from Last 3 Encounters:   08/07/24 120/84   07/10/24 127/85   07/10/24 127/90        BMI is >= 25 and <30. (Overweight) The following options were offered after discussion;: exercise counseling/recommendations and nutrition counseling/recommendations        Physical Exam  Vitals reviewed.   Constitutional:       Appearance: Normal appearance. She is well-developed.   HENT:      Head: Normocephalic and atraumatic.   Eyes:      Conjunctiva/sclera: Conjunctivae normal.      Pupils: Pupils are equal, round, and reactive to light.   Cardiovascular:      Rate and Rhythm: Normal rate and regular rhythm.      Heart sounds: Normal heart sounds. No murmur heard.  Pulmonary:      Effort: Pulmonary effort is normal.      Breath sounds: Normal breath sounds. No wheezing or rhonchi.   Abdominal:      General: Bowel sounds are normal. There is no distension.      Palpations: Abdomen is soft.      Tenderness: There is no abdominal tenderness.   Skin:     General: Skin is warm and dry.   Neurological:      Mental Status: " She is alert and oriented to person, place, and time.   Psychiatric:         Mood and Affect: Mood and affect normal.         Behavior: Behavior normal.         Thought Content: Thought content normal.         Judgment: Judgment normal.     Physical Exam        Result Review :       Common labs          7/10/2024    20:33   Common Labs   Glucose 93    BUN 11    Creatinine 0.88    Sodium 136    Potassium 4.2    Chloride 101    Calcium 9.4    Albumin 4.8    Total Bilirubin 0.6    Alkaline Phosphatase 71    AST (SGOT) 21    ALT (SGPT) 14    WBC 12.98    Hemoglobin 12.5    Hematocrit 37.4    Platelets 312      Results  Laboratory Studies  Tick titers were negative.                 Assessment and Plan      Assessment & Plan  1. Routine checkup.  Her lab results from fever are still pending. Tick titers were negative. Given her age of 39, she is considered high risk pregnancy due to potential risks. Aspirin was prescribed. Her cholesterol levels will be rechecked, and blood work was ordered. A mammogram order was also placed.    Follow-up  She will follow up in 1 year.    Diagnoses and all orders for this visit:    1. Annual physical exam (Primary)  -     CBC (No Diff); Future    2. Screening for lipid disorders  -     Lipid Panel; Future  -     Comprehensive Metabolic Panel; Future    3. Screening for thyroid disorder  -     TSH; Future    4. Breast cancer screening by mammogram  -     Mammo Screening Digital Tomosynthesis Bilateral With CAD; Future    5. History of stroke  -     aspirin 81 MG EC tablet; Take 1 tablet by mouth Daily.  Dispense: 90 tablet; Refill: 1      Continue with psychiatry.       Follow Up     Return in about 1 year (around 8/7/2025) for Annual physical.    Updated annual wellness visit checklist.  Immunizations discussed.  Screening discussed and/or ordered.  Recommend yearly dental and eye exams. Also discussed monitoring of blood pressure and lipids.      Patient was given instructions and  counseling regarding her condition or for health maintenance advice. Please see specific information pulled into the AVS if appropriate.     Patient or patient representative verbalized consent for the use of Ambient Listening during the visit with  MICHA Muñiz for chart documentation. 8/7/2024  10:53 EDT    MICHA Muñiz

## 2024-08-07 ENCOUNTER — OFFICE VISIT (OUTPATIENT)
Dept: FAMILY MEDICINE CLINIC | Facility: CLINIC | Age: 39
End: 2024-08-07
Payer: COMMERCIAL

## 2024-08-07 VITALS
SYSTOLIC BLOOD PRESSURE: 120 MMHG | TEMPERATURE: 97.8 F | HEIGHT: 64 IN | DIASTOLIC BLOOD PRESSURE: 84 MMHG | OXYGEN SATURATION: 99 % | HEART RATE: 116 BPM | BODY MASS INDEX: 25.16 KG/M2 | WEIGHT: 147.4 LBS

## 2024-08-07 DIAGNOSIS — Z86.73 HISTORY OF STROKE: ICD-10-CM

## 2024-08-07 DIAGNOSIS — Z13.220 SCREENING FOR LIPID DISORDERS: ICD-10-CM

## 2024-08-07 DIAGNOSIS — Z00.00 ANNUAL PHYSICAL EXAM: Primary | ICD-10-CM

## 2024-08-07 DIAGNOSIS — Z12.31 BREAST CANCER SCREENING BY MAMMOGRAM: ICD-10-CM

## 2024-08-07 DIAGNOSIS — Z13.29 SCREENING FOR THYROID DISORDER: ICD-10-CM

## 2024-08-07 LAB
ALBUMIN SERPL-MCNC: 4.4 G/DL (ref 3.5–5.2)
ALBUMIN/GLOB SERPL: 1.6 G/DL
ALP SERPL-CCNC: 61 U/L (ref 39–117)
ALT SERPL W P-5'-P-CCNC: 12 U/L (ref 1–33)
ANION GAP SERPL CALCULATED.3IONS-SCNC: 9 MMOL/L (ref 5–15)
AST SERPL-CCNC: 17 U/L (ref 1–32)
BILIRUB SERPL-MCNC: 0.3 MG/DL (ref 0–1.2)
BUN SERPL-MCNC: 9 MG/DL (ref 6–20)
BUN/CREAT SERPL: 8.7 (ref 7–25)
CALCIUM SPEC-SCNC: 9.6 MG/DL (ref 8.6–10.5)
CHLORIDE SERPL-SCNC: 105 MMOL/L (ref 98–107)
CHOLEST SERPL-MCNC: 183 MG/DL (ref 0–200)
CO2 SERPL-SCNC: 24 MMOL/L (ref 22–29)
CREAT SERPL-MCNC: 1.04 MG/DL (ref 0.57–1)
DEPRECATED RDW RBC AUTO: 38.9 FL (ref 37–54)
EGFRCR SERPLBLD CKD-EPI 2021: 69.4 ML/MIN/1.73
ERYTHROCYTE [DISTWIDTH] IN BLOOD BY AUTOMATED COUNT: 12.4 % (ref 12.3–15.4)
GLOBULIN UR ELPH-MCNC: 2.8 GM/DL
GLUCOSE SERPL-MCNC: 91 MG/DL (ref 65–99)
HCT VFR BLD AUTO: 36.9 % (ref 34–46.6)
HDLC SERPL-MCNC: 87 MG/DL (ref 40–60)
HGB BLD-MCNC: 12.3 G/DL (ref 12–15.9)
LDLC SERPL CALC-MCNC: 88 MG/DL (ref 0–100)
LDLC/HDLC SERPL: 1.02 {RATIO}
MCH RBC QN AUTO: 29.3 PG (ref 26.6–33)
MCHC RBC AUTO-ENTMCNC: 33.3 G/DL (ref 31.5–35.7)
MCV RBC AUTO: 87.9 FL (ref 79–97)
PLATELET # BLD AUTO: 316 10*3/MM3 (ref 140–450)
PMV BLD AUTO: 10.9 FL (ref 6–12)
POTASSIUM SERPL-SCNC: 4.3 MMOL/L (ref 3.5–5.2)
PROT SERPL-MCNC: 7.2 G/DL (ref 6–8.5)
RBC # BLD AUTO: 4.2 10*6/MM3 (ref 3.77–5.28)
SODIUM SERPL-SCNC: 138 MMOL/L (ref 136–145)
TRIGL SERPL-MCNC: 37 MG/DL (ref 0–150)
TSH SERPL DL<=0.05 MIU/L-ACNC: 0.73 UIU/ML (ref 0.27–4.2)
VLDLC SERPL-MCNC: 8 MG/DL (ref 5–40)
WBC NRBC COR # BLD AUTO: 7.13 10*3/MM3 (ref 3.4–10.8)

## 2024-08-07 PROCEDURE — 80061 LIPID PANEL: CPT | Performed by: NURSE PRACTITIONER

## 2024-08-07 PROCEDURE — 99396 PREV VISIT EST AGE 40-64: CPT | Performed by: NURSE PRACTITIONER

## 2024-08-07 PROCEDURE — 80050 GENERAL HEALTH PANEL: CPT | Performed by: NURSE PRACTITIONER

## 2024-08-07 RX ORDER — ASPIRIN 81 MG/1
81 TABLET ORAL DAILY
Qty: 90 TABLET | Refills: 1 | Status: SHIPPED | OUTPATIENT
Start: 2024-08-07

## 2024-08-08 ENCOUNTER — OFFICE VISIT (OUTPATIENT)
Dept: BEHAVIORAL HEALTH | Facility: CLINIC | Age: 39
End: 2024-08-08
Payer: COMMERCIAL

## 2024-08-08 DIAGNOSIS — F41.1 GAD (GENERALIZED ANXIETY DISORDER): Primary | ICD-10-CM

## 2024-08-20 ENCOUNTER — TELEPHONE (OUTPATIENT)
Dept: PSYCHIATRY | Facility: CLINIC | Age: 39
End: 2024-08-20
Payer: COMMERCIAL

## 2024-08-28 ENCOUNTER — OFFICE VISIT (OUTPATIENT)
Dept: PSYCHIATRY | Facility: CLINIC | Age: 39
End: 2024-08-28
Payer: COMMERCIAL

## 2024-08-28 VITALS
SYSTOLIC BLOOD PRESSURE: 132 MMHG | BODY MASS INDEX: 25.2 KG/M2 | WEIGHT: 147.6 LBS | DIASTOLIC BLOOD PRESSURE: 76 MMHG | HEART RATE: 111 BPM | HEIGHT: 64 IN

## 2024-08-28 DIAGNOSIS — F41.1 GAD (GENERALIZED ANXIETY DISORDER): ICD-10-CM

## 2024-08-28 DIAGNOSIS — F90.2 ATTENTION DEFICIT HYPERACTIVITY DISORDER (ADHD), COMBINED TYPE: Primary | ICD-10-CM

## 2024-08-28 DIAGNOSIS — F33.42 MAJOR DEPRESSION, RECURRENT, FULL REMISSION: ICD-10-CM

## 2024-08-28 RX ORDER — DEXTROAMPHETAMINE SACCHARATE, AMPHETAMINE ASPARTATE, DEXTROAMPHETAMINE SULFATE AND AMPHETAMINE SULFATE 5; 5; 5; 5 MG/1; MG/1; MG/1; MG/1
20 TABLET ORAL 2 TIMES DAILY
Qty: 60 TABLET | Refills: 0 | Status: SHIPPED | OUTPATIENT
Start: 2024-09-27

## 2024-08-28 RX ORDER — HYDROXYZINE HYDROCHLORIDE 25 MG/1
25 TABLET, FILM COATED ORAL
Qty: 90 TABLET | Refills: 0 | Status: SHIPPED | OUTPATIENT
Start: 2024-08-28

## 2024-08-28 RX ORDER — DEXTROAMPHETAMINE SACCHARATE, AMPHETAMINE ASPARTATE, DEXTROAMPHETAMINE SULFATE AND AMPHETAMINE SULFATE 5; 5; 5; 5 MG/1; MG/1; MG/1; MG/1
20 TABLET ORAL 2 TIMES DAILY
Qty: 60 TABLET | Refills: 0 | Status: SHIPPED | OUTPATIENT
Start: 2024-08-28

## 2024-08-28 NOTE — PROGRESS NOTES
"[facility]  Follow-up Visit    Chief Complaint: \"I went to the doctor for having trouble focusing... doing school, work... in the process of moving... I get overwhelmed and don't actually start on something... get distracted and do something else... need to sit down and study... focus on what I'm doing... hard to get started doing that.\"     History of Present Illness: Laurel Molina is a 39 y.o. female who presents today for follow-up regarding ADHD, VIOLETTE, MDD. Last seen:  at which time amphetamine was titrated. She presents unaccompanied in no acute distress and engages with me appropriately. Psychotropic regimen perceived to be partially effective. Side-effects per given history: denies.    Current treatment regimen includes:   - amphetamine 20 mg BID  - duloxetine 90 mg QD  - hydroxyzine 25 mg HS  - trazodone 50 mg HS    Today Laurel reports she's doing okay, though a cousin to whom she was close recently passed. Grief symptoms are being processed well according to the patient; being around the support and love of family after recent events was helpful. ADHD symptoms are fairly managed with current interventions. Anxiety symptoms are fairly managed with current interventions. Depression symptoms are fairly managed with current interventions. Thought process and content are devoid of overt aberration suggestive of acute tierney/psychosis. The patient denies SI/HI/AVH. There are no overt changes on exam today compared to most recent evaluation.  - contextual changes: recently visited [place] and found out her cousin (33 YO; mother of three children of which the oldest is 11 YO - patient was close to her) passed away in [facility] and then had to fly to [place] for the ; got a new dog from work ([employer]; dog's owner is having health issues)  - sleep: generally adequate; no change  - appetite: generally adequate; no change    I have counseled the patient with regard to " diagnoses and the recommended treatment regimen as documented below. Patient acknowledges the diagnoses per my rendered interpretation. Patient demonstrates understanding of potential risks/benefits/side effects associated with this regimen and is amenable to proceed in this fashion.     Psychotherapy  - Time: 12 minutes  - interventions employed: the therapeutic alliance was strengthened to encourage the patient to express their thoughts and feelings freely. Esteem building was enhanced through praise, reassurance, normalizing/challenging, and encouragement as appropriate. Coping skills were enhanced to build distress tolerance skills and emotional regulation. Allowed patient to freely discuss issues without interruption or judgement with unconditional positive regard, active listening skills, and empathy. Provided a safe, confidential environment to facilitate the development of a positive therapeutic relationship and encourage open, honest communication. Assisted patient in processing session content; acknowledged and normalized/addressed, as appropriate, patient’s thoughts, feelings, and concerns by utilizing a person-centered approach in efforts to build appropriate rapport and a positive therapeutic relationship with open and honest communication.   - Diagnoses: see assessment and plan below  - Symptoms: see subjective above  - Goals   - patient: cultivate concentration and focus to improve functional capacity, sustain mood and manageable anxiety levels, nourish creativity   - provider: challenge patterns of living conducive to pathology, strengthen defenses, promote problems solving, restore adaptive functioning and provide symptom relief.  - Treatment plan: continue supportive psychotherapy in subsequent appointments to provide symptom relief; see assessment and plan below for additional details:   - iteration: 1   - progress: partial   - (X)illumination, (X)contextualization, (working)detection,  (working)development, (-)elaboration, (-)refinement  - functional status: good  - mental status exam: as below  - prognosis: good    Psychiatric History:  Diagnoses: ADD, anxiety, depression  Outpatient history: denies  Inpatient history: denies  Medication trials: sertraline (worked okay), bupropion (ineffective 150 mg), buspirone (didn't seem to have an impact; was taking once daily)  Other treatment modalities: has not done therapy in the past  Presenting regimen: atomoxetine 40 mg QD, duloxetine 60 mg QD, hydroxyzine 25 mg HS, trazodone 50 mg HS  Self harm: years ago cut  Suicide attempts: denies     Social History:  Residence: lives in a house with her mother; was living with boyfriend and they had an argument eliciting the change in living for the time being; does not have children  Vocation: [employer] ([employer])  Education: working on associate's degree (, 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)  Pertinent developmental history: issues with math growing up; denies abuse hx  Pertinent legal history: denies  Hobbies/interests: reading (historical fiction, gothic horror), art (drawing and painting), music (clarinet and piano)  Mosque: denies  Exercise: yes, walks three times weekly  Dietary habits: defer  Sleep hygiene: defer  Social habits: no pertinent issues  Sunlight: no concern for under-exposure  Caffeine intake: coffee in the AM with an energy drink, occasional soda, occasional tea  Hydration habits: not enough water   history: denies    Social History     Socioeconomic History    Marital status: Single   Tobacco Use    Smoking status: Never     Passive exposure: Past    Smokeless tobacco: Never   Vaping Use    Vaping status: Never Used   Substance and Sexual Activity    Alcohol use: Never    Drug use: Never    Sexual activity: Yes     Partners: Male     Birth control/protection: None     Tobacco use counseling/intervention: N/A, patient does not use tobacco; patient has been  counseled with regard to risks of tobacco use.    PHQ-9 Depression Screening  PHQ-9 Total Score:      Little interest or pleasure in doing things?     Feeling down, depressed, or hopeless?     Trouble falling or staying asleep, or sleeping too much?     Feeling tired or having little energy?     Poor appetite or overeating?     Feeling bad about yourself - or that you are a failure or have let yourself or your family down?     Trouble concentrating on things, such as reading the newspaper or watching television?     Moving or speaking so slowly that other people could have noticed? Or the opposite - being so fidgety or restless that you have been moving around a lot more than usual?     Thoughts that you would be better off dead, or of hurting yourself in some way?     PHQ-9 Total Score       Change in PHQ-9 since last measure: N/A (16)    VIOLETTE-7       Change in VIOLETTE-7 since last measure: N/A (19)    Problem List:  Patient Active Problem List   Diagnosis    Stroke    VIOLETTE (generalized anxiety disorder)    History of stroke    Cat bite    Attention deficit hyperactivity disorder (ADHD), combined type    Major depressive disorder, recurrent episode, in partial remission     Allergy:   Allergies   Allergen Reactions    Naproxen Hives and Anaphylaxis      Discontinued Medications:  Medications Discontinued During This Encounter   Medication Reason    hydrOXYzine (ATARAX) 25 MG tablet Reorder    amphetamine-dextroamphetamine (ADDERALL) 20 MG tablet Reorder       Current Medications:   Current Outpatient Medications   Medication Sig Dispense Refill    amphetamine-dextroamphetamine (ADDERALL) 20 MG tablet Take 1 tablet by mouth 2 (Two) Times a Day. 60 tablet 0    [START ON 9/27/2024] amphetamine-dextroamphetamine (ADDERALL) 20 MG tablet Take 1 tablet by mouth 2 (Two) Times a Day. 60 tablet 0    aspirin 81 MG EC tablet Take 1 tablet by mouth Daily. 90 tablet 1    DULoxetine (CYMBALTA) 30 MG capsule Take 1 capsule by mouth  Daily. 90 capsule 0    DULoxetine (CYMBALTA) 60 MG capsule TAKE 1 CAPSULE BY MOUTH EVERY DAY 90 capsule 3    hydrOXYzine (ATARAX) 25 MG tablet Take 1 tablet by mouth every night at bedtime. 90 tablet 0    ibuprofen (ADVIL,MOTRIN) 800 MG tablet Take 1 tablet by mouth 2 (Two) Times a Day As Needed.      traZODone (DESYREL) 50 MG tablet TAKE 1 TO 2 TABLETS BY MOUTH AT BEDTIME AS NEEDED FOR INSOMNIA 90 tablet 3     No current facility-administered medications for this visit.     Past Medical History:  Past Medical History:   Diagnosis Date    Acid reflux     ADHD (attention deficit hyperactivity disorder)     Allergic rhinitis     Anxiety     Arthritis     Depression     Hyperlipidemia     Hypertension     Leg pain     Seasonal allergies     Sinus trouble     Sleep disturbance     Stroke 03/16/2021    Thumb fracture 2012    Right     Past Surgical History:  Past Surgical History:   Procedure Laterality Date    ANKLE SURGERY      EYE SURGERY  1989    FRACTURE SURGERY  1/10/2014    Kneecap reconstruction    KNEE PCL RECONSTRUCTION Left 2014    open ligament     Mental Status Exam:   Appearance: well-groomed, sits upright, age-appropriate, normal habitus  Behavior: calm, cooperative, appropriate in demeanor, appropriate eye-contact  Mood/affect: fair / mood-congruent and appropriate in both range and amplitude  Speech: within expected variance; appropriate rate, appropriate rhythm, appropriate tone; non-pressured  Thought Process: linear, goal-directed; no FOI or ANASTACIA; abstraction intact  Thought Content: coherent, devoid of overt delusions/perceptual disturbances  SI/HI: denies both SI and HI; exhibits future-orientation, self-advocates appropriately, no regular self-harm, no appreciable intent  Memory: no overt deficits  Orientation: oriented to person/place/time/situation  Concentration: appropriate during interview; +subjective deficits  Intellectual capacity: presumptively average  Insight: fair by given  "history/exam  Judgment: appropriate by given history/exam  Psychomotor: no appreciable latency/retardation/agitation/tremor  Gait: WNL    Review of Systems:  Review of Systems   Constitutional:  Negative for activity change, appetite change and unexpected weight change.   Cardiovascular:  Negative for chest pain and palpitations.   Gastrointestinal:  Negative for abdominal pain and nausea.   Psychiatric/Behavioral:  Negative for agitation and sleep disturbance.      Vital Signs:   /76   Pulse 111   Ht 162.6 cm (64.02\")   Wt 67 kg (147 lb 9.6 oz)   BMI 25.32 kg/m²      Lab Results:   Office Visit on 08/07/2024   Component Date Value Ref Range Status    TSH 08/07/2024 0.734  0.270 - 4.200 uIU/mL Final    WBC 08/07/2024 7.13  3.40 - 10.80 10*3/mm3 Final    RBC 08/07/2024 4.20  3.77 - 5.28 10*6/mm3 Final    Hemoglobin 08/07/2024 12.3  12.0 - 15.9 g/dL Final    Hematocrit 08/07/2024 36.9  34.0 - 46.6 % Final    MCV 08/07/2024 87.9  79.0 - 97.0 fL Final    MCH 08/07/2024 29.3  26.6 - 33.0 pg Final    MCHC 08/07/2024 33.3  31.5 - 35.7 g/dL Final    RDW 08/07/2024 12.4  12.3 - 15.4 % Final    RDW-SD 08/07/2024 38.9  37.0 - 54.0 fl Final    MPV 08/07/2024 10.9  6.0 - 12.0 fL Final    Platelets 08/07/2024 316  140 - 450 10*3/mm3 Final    Glucose 08/07/2024 91  65 - 99 mg/dL Final    BUN 08/07/2024 9  6 - 20 mg/dL Final    Creatinine 08/07/2024 1.04 (H)  0.57 - 1.00 mg/dL Final    Sodium 08/07/2024 138  136 - 145 mmol/L Final    Potassium 08/07/2024 4.3  3.5 - 5.2 mmol/L Final    Chloride 08/07/2024 105  98 - 107 mmol/L Final    CO2 08/07/2024 24.0  22.0 - 29.0 mmol/L Final    Calcium 08/07/2024 9.6  8.6 - 10.5 mg/dL Final    Total Protein 08/07/2024 7.2  6.0 - 8.5 g/dL Final    Albumin 08/07/2024 4.4  3.5 - 5.2 g/dL Final    ALT (SGPT) 08/07/2024 12  1 - 33 U/L Final    AST (SGOT) 08/07/2024 17  1 - 32 U/L Final    Alkaline Phosphatase 08/07/2024 61  39 - 117 U/L Final    Total Bilirubin 08/07/2024 0.3  0.0 - 1.2 " mg/dL Final    Globulin 08/07/2024 2.8  gm/dL Final    A/G Ratio 08/07/2024 1.6  g/dL Final    BUN/Creatinine Ratio 08/07/2024 8.7  7.0 - 25.0 Final    Anion Gap 08/07/2024 9.0  5.0 - 15.0 mmol/L Final    eGFR 08/07/2024 69.4  >60.0 mL/min/1.73 Final    Total Cholesterol 08/07/2024 183  0 - 200 mg/dL Final    Triglycerides 08/07/2024 37  0 - 150 mg/dL Final    HDL Cholesterol 08/07/2024 87 (H)  40 - 60 mg/dL Final    LDL Cholesterol  08/07/2024 88  0 - 100 mg/dL Final    VLDL Cholesterol 08/07/2024 8  5 - 40 mg/dL Final    LDL/HDL Ratio 08/07/2024 1.02   Final   Admission on 07/10/2024, Discharged on 07/10/2024   Component Date Value Ref Range Status    Glucose 07/10/2024 93  65 - 99 mg/dL Final    BUN 07/10/2024 11  6 - 20 mg/dL Final    Creatinine 07/10/2024 0.88  0.57 - 1.00 mg/dL Final    Sodium 07/10/2024 136  136 - 145 mmol/L Final    Potassium 07/10/2024 4.2  3.5 - 5.2 mmol/L Final    Chloride 07/10/2024 101  98 - 107 mmol/L Final    CO2 07/10/2024 23.5  22.0 - 29.0 mmol/L Final    Calcium 07/10/2024 9.4  8.6 - 10.5 mg/dL Final    Total Protein 07/10/2024 7.8  6.0 - 8.5 g/dL Final    Albumin 07/10/2024 4.8  3.5 - 5.2 g/dL Final    ALT (SGPT) 07/10/2024 14  1 - 33 U/L Final    AST (SGOT) 07/10/2024 21  1 - 32 U/L Final    Alkaline Phosphatase 07/10/2024 71  39 - 117 U/L Final    Total Bilirubin 07/10/2024 0.6  0.0 - 1.2 mg/dL Final    Globulin 07/10/2024 3.0  gm/dL Final    A/G Ratio 07/10/2024 1.6  g/dL Final    BUN/Creatinine Ratio 07/10/2024 12.5  7.0 - 25.0 Final    Anion Gap 07/10/2024 11.5  5.0 - 15.0 mmol/L Final    eGFR 07/10/2024 84.8  >60.0 mL/min/1.73 Final    WBC 07/10/2024 12.98 (H)  3.40 - 10.80 10*3/mm3 Final    RBC 07/10/2024 4.16  3.77 - 5.28 10*6/mm3 Final    Hemoglobin 07/10/2024 12.5  12.0 - 15.9 g/dL Final    Hematocrit 07/10/2024 37.4  34.0 - 46.6 % Final    MCV 07/10/2024 89.9  79.0 - 97.0 fL Final    MCH 07/10/2024 30.0  26.6 - 33.0 pg Final    MCHC 07/10/2024 33.4  31.5 - 35.7 g/dL  Final    RDW 07/10/2024 13.2  12.3 - 15.4 % Final    RDW-SD 07/10/2024 43.6  37.0 - 54.0 fl Final    MPV 07/10/2024 10.4  6.0 - 12.0 fL Final    Platelets 07/10/2024 312  140 - 450 10*3/mm3 Final    Neutrophil % 07/10/2024 82.6 (H)  42.7 - 76.0 % Final    Lymphocyte % 07/10/2024 8.5 (L)  19.6 - 45.3 % Final    Monocyte % 07/10/2024 7.5  5.0 - 12.0 % Final    Eosinophil % 07/10/2024 0.7  0.3 - 6.2 % Final    Basophil % 07/10/2024 0.3  0.0 - 1.5 % Final    Immature Grans % 07/10/2024 0.4  0.0 - 0.5 % Final    Neutrophils, Absolute 07/10/2024 10.73 (H)  1.70 - 7.00 10*3/mm3 Final    Lymphocytes, Absolute 07/10/2024 1.10  0.70 - 3.10 10*3/mm3 Final    Monocytes, Absolute 07/10/2024 0.97 (H)  0.10 - 0.90 10*3/mm3 Final    Eosinophils, Absolute 07/10/2024 0.09  0.00 - 0.40 10*3/mm3 Final    Basophils, Absolute 07/10/2024 0.04  0.00 - 0.20 10*3/mm3 Final    Immature Grans, Absolute 07/10/2024 0.05  0.00 - 0.05 10*3/mm3 Final    nRBC 07/10/2024 0.0  0.0 - 0.2 /100 WBC Final    A. phagocytophilum PCR 07/10/2024 Negative  Negative Final    No Anaplasma phagocytophilum DNA detected.  A. phagocytophilum has been  characterized as the causative agent of Human Granulocytic  Ehrlichiosis (HGE).    Ehrlichia chaffeensis PCR 07/10/2024 Negative  Negative Final    No Ehrlichia chaffeensis DNA detected.  E. chaffeensis has been  characterized as the causative agent of Human Monocytic Ehrlichiosis  (HME).    Rickettsia rickettsii DNA, RT 07/10/2024 Not Detected   Final    REFERENCE RANGE: NOT DETECTED  This test was developed and its analytical performance  characteristics have been determined by [facility].  It has not been cleared or approved by FDA. This assay has  been validated pursuant to the CLIA regulations and is  used for clinical purposes.    Lyme Total Antibody EIA 07/10/2024 Negative  Negative Final    Lyme antibodies not detected. Reflex testing is not indicated.  No laboratory evidence of infection with B.  burgdorferi (Lyme disease).  Negative results may occur in patients recently infected (less than  or equal to 14 days) with B. burgdorferi.  If recent infection is  suspected, repeat testing on a new sample collected in 7 to 14 days is  recommended.    Lactate 07/10/2024 1.4  0.5 - 2.0 mmol/L Final    Blood Culture 07/10/2024 No growth at 5 days   Final    Blood Culture 07/10/2024 No growth at 5 days   Final    Color, UA 07/10/2024 Yellow  Yellow, Straw Final    Appearance, UA 07/10/2024 Clear  Clear Final    pH, UA 07/10/2024 6.5  5.0 - 8.0 Final    Specific Gravity, UA 07/10/2024 1.026  1.005 - 1.030 Final    Glucose, UA 07/10/2024 Negative  Negative Final    Ketones, UA 07/10/2024 40 mg/dL (2+) (A)  Negative Final    Bilirubin, UA 07/10/2024 Negative  Negative Final    Blood, UA 07/10/2024 Negative  Negative Final    Protein, UA 07/10/2024 Negative  Negative Final    Leuk Esterase, UA 07/10/2024 Trace (A)  Negative Final    Nitrite, UA 07/10/2024 Negative  Negative Final    Urobilinogen, UA 07/10/2024 1.0 E.U./dL  0.2 - 1.0 E.U./dL Final    Extra Tube 07/10/2024 Hold for add-ons.   Final    Auto resulted.    RBC, UA 07/10/2024 0-2  None Seen, 0-2 /HPF Final    WBC, UA 07/10/2024 0-2  None Seen, 0-2 /HPF Final    Urine culture not indicated.    Bacteria, UA 07/10/2024 None Seen  None Seen /HPF Final    Squamous Epithelial Cells, UA 07/10/2024 0-2  None Seen, 0-2 /HPF Final    Hyaline Casts, UA 07/10/2024 0-2  None Seen /LPF Final    Methodology 07/10/2024 Automated Microscopy   Final   Admission on 07/10/2024, Discharged on 07/10/2024   Component Date Value Ref Range Status    SARS Antigen 07/10/2024 Not Detected  Not Detected, Presumptive Negative Final    Influenza A Antigen JOSÉ 07/10/2024 Not Detected  Not Detected Final    Influenza B Antigen JOSÉ 07/10/2024 Not Detected  Not Detected Final    Internal Control 07/10/2024 Passed  Passed Final    Lot Number 07/10/2024 [id number]   Final    Expiration Date  07/10/2024 2/5/2025   Final     EKG Results:  No orders to display     Imaging Results:  No Images in the past 120 days found.    ASSESSMENT AND PLAN:    ICD-10-CM ICD-9-CM   1. Attention deficit hyperactivity disorder (ADHD), combined type  F90.2 314.01   2. VIOLETTE (generalized anxiety disorder)  F41.1 300.02   3. Major depression, recurrent, full remission  F33.42 296.36     39 y.o. female who presents today for follow-up regarding ADHD, VIOLETTE, MDD. We have discussed the interval history and the treatment plan below, including potential R/B/SE of the recommended regimen of which the patient demonstrates understanding. Patient is agreeable to call 911 or go to the nearest ER should she become concerned for her own safety and/or the safety of those around her. There are no overt indices of acute tierney/psychosis on exam today.     Medication regimen: no change - continue amphetamine, duloxetine, hydroxyzine, trazodone; patient is advised not to misuse prescribed medications or to use them with any exogenous substances that aren't disclosed to this provider as they may interact with the regimen to the patient's detriment.   Risk assessment: protracted risk is low, imminent risk is low - slight interval change. Do note that this is subject to change with the [employer] of new stressors, treatment non-adherence, use of substances, and/or new medical ails.   Monitoring: reviewed labs/imaging as populated above  Therapy: referred  Follow-up: 3 months  Communications: N/A  Treatment plan: due; defer (at goal)    TREATMENT PLAN/GOALS: challenge patterns of living conducive to symptom burden, implement recommended regimen as above with augmentative, intermittent supportive psychotherapy to reduce symptom burden. Patient acknowledged and verbally consented to continue treatment. The importance of adherence to the recommended treatment and interval follow-up appointments was again emphasized today: patient has good treatment adherence  per given history. Patient was today reminded to limit daily caffeine intake, hydrate appropriately, eat healthy and nutritious foods, engage sleep hygiene measures, engage appropriate exposure to sunlight, engage with hobbies in balance with life necessities, and exercise appropriate to their capacity to do so.     Billing: This encounter is of moderate complexity based on number/complexity of problems addressed today and risk of complications/morbidity: 2+ stable chronic illnesses and prescription management.    Parts of this note are electronic transcriptions/translations of spoken language to printed text using the Dragon Dictation system.    Electronically signed by Tejinder Amezcua MD, 08/28/24, [id number]

## 2024-08-29 ENCOUNTER — HOSPITAL ENCOUNTER (OUTPATIENT)
Dept: MAMMOGRAPHY | Facility: HOSPITAL | Age: 39
Discharge: HOME | End: 2024-08-29
Admitting: NURSE PRACTITIONER
Payer: COMMERCIAL

## 2024-08-29 DIAGNOSIS — Z12.31 BREAST CANCER SCREENING BY MAMMOGRAM: ICD-10-CM

## 2024-08-29 PROCEDURE — 77063 BREAST TOMOSYNTHESIS BI: CPT

## 2024-08-29 PROCEDURE — 77067 SCR MAMMO BI INCL CAD: CPT

## 2024-09-03 DIAGNOSIS — Z12.31 BREAST CANCER SCREENING BY MAMMOGRAM: Primary | ICD-10-CM

## 2024-09-11 NOTE — PROGRESS NOTES
Assessment and Plan:  1. Prospective kidney transplant donor with a couple of issues to be addressed prior to donation. Patient's blood pressure is acceptable at this visit, kidney function appears to be acceptable with Iohexol pending, and urinalysis is bland.  2. Overweight - patient is slightly above BMI goal of 30 or less and recommend increased exercise and watching caloric intake for weight loss.  3. H/o positive ENOC - unclear significance.  Will repeat ENOC and also obtain complement C3 and C4 levels.  4. Constipation - occasional symptoms.  Would recommend senna and/or docusate as needed.    Discussed the risks of donating a kidney, including the surgical risk and the possible risks of living with one kidney.    Education about expected post-donation kidney function and how chronic kidney disease (CKD) and end stage kidney disease (ESKD) might potentially impact the donor in the future, include, but not limited to:       - On average, donors will have 25-35% permanent loss of kidney function at donation.       - Baseline risk of ESKD may slightly exceed that of members of the general population with the same demographic profile.       - Donor risks must be interpreted in light of known epidemiology of both CKD or ESKD, such as that CKD generally develops in midlife (40-50 years old) and ESKD generally develops after age 60.       - Medical evaluation of young potential donors cannot predict lifetime risk of CKD or ESKD.       - Donors may be at higher risk for CKD if they sustain damage to the remaining kidney.       - Development of CKD and progression of ESKD may be more rapid with only 1 kidney.       - Some type of kidney replacement therapy, either kidney transplant or dialysis, is required when reaching ESKD.    Potential medical or surgical risks include, but not limited to:       - Death.       - Scars, pain, fatigue, and other consequences typical of any surgical procedure.       - Decreased kidney  Progress Note    Date: 09/12/2024  Time In: 3:17  Time Out: 4:03    Patient Legal Name: Laurel Molina  Patient Age: 39 y.o.    Mode of visit: In person  Location of provider: [address], [place] [address]  Location of patient: Office      CHIEF COMPLAINT: anxiety    Subjective   History of Present Illness   Laurel is a 39 y.o. female who presents today as a follow-up for continued psychotherapy. Patient reported things have been hectic with her mom currently in the hospital and she has been staying with her. Patient advised things are going well with school and work.  Patient reported she has been doing some self-care, reading and listening to music and getting rest.  Patient shared she is scared about her mom's condition and it has increased her anxiety. Patient described having some social anxiety and struggles with crowds. Patient reported she has been using positive self-talk and has found it effective. Patient advised she is continuing to make healthy food choices and losing weight. Patient is voluntarily requesting to participate in outpatient therapy at [facility]. Patient 17 minutes late checking in today. Patient rated her anxiety at 8 today on a 0-10 scale where 0= no symptoms.      History obtained from referring provider's note on 5/6/24:  Psychiatric History:  Diagnoses: ADD, anxiety, depression  Outpatient history: denies  Inpatient history: denies  Medication trials: sertraline (worked okay), bupropion (ineffective 150 mg), buspirone (didn't seem to have an impact; was taking once daily)  Other treatment modalities: has not done therapy in the past  Presenting regimen: atomoxetine 40 mg QD, duloxetine 60 mg QD, hydroxyzine 25 mg HS, trazodone 50 mg HS  Self harm: years ago cut  Suicide attempts: denies    Assessment    Mental Status Exam     Appearance: good hygiene and dressed appropriately for the weather  Behavior: calm  Cooperation:  engaged, cooperative,  attentive, and friendly  Eye Contact:  good  Affect:  congruent  Mood: expressive and anxious  Speech: responsive  Thought Process:  organized  Thought Content: appropriate  Suicidal: denies  Homicidal:  denies  Hallucinations:  denies  Memory:  intact  Orientation:  person, place, time, and situation  Reliability:  reliable  Insight:  good  Judgment:  good    Clinical Intervention       ICD-10-CM ICD-9-CM   1. VIOLETTE (generalized anxiety disorder)  F41.1 300.02        Individual psychotherapy was provided utilizing CBT and person-centered techniques to build rapport, encourage expression of thoughts and feelings, support self-esteem, establish new coping skills, assess symptoms, and practice identifying and accepting positive thoughts.  Therapist utilized open-ended questions to encourage the development of a positive therapeutic relationship and open communication.  Therapist normalized/validated patient’s thoughts and feelings as appropriate. Provided list of positive affirmations and encouraged continued positive self-talk. Patient rated her anxiety at 8 today on a 0-10 scale where 0= no symptoms.     Plan   Plan & Goals     Moving forward, we will continue to build rapport and reinforce and build upon effective coping strategies utilizing CBT and person-centered techniques.    Patient acknowledged and verbally consented to continue working toward resolving current treatment plan goals and was educated on the importance of participation in the therapeutic process.  Patient will remain compliant with medication regimen as prescribed. Discuss any medication side effects, questions or concerns with prescribing provider.  Call 911 or present to the nearest emergency room in an emergency situation.  National Suicide Prevention Lifeline: Call 988. The Lifeline provides 24/7, free and confidential support for people in distress, prevention and crisis resources.  Crisis Text Line  Text HOME To 110736    Return in about 4  function.       - Abdominal or bowel symptoms, such as bloating and nausea, and developing bowel obstruction.       - Kidney failure (ESKD) and the need for a kidney transplant or dialysis for the donor.       - Impact of obesity, hypertension, or other donor-specific medical conditions on morbidity and mortality of the potential donor.    Patients overall evaluation will be discussed with the transplant team and a final recommendation on the patients' suitability to be a kidney transplant donor will be made at that time.    Consult:  Elle Hill was seen in consultation at the request of Dr. Terry Pearl for evaluation as a potential kidney transplant donor.    Reason for Visit:  Elle Hill is a 30 year old female who presents for a kidney donor evaluation.  Patient would like to donate to Colin Hill, her step son.    HPI:    Patient reports feeling good overall with minimal medical complaints.  Her energy level is good and has been normal.  She is active and does get some exercise.  Denies any chest pain or shortness of breath with exertion.  Appetite is good and weight has been stable.  No nausea, vomiting or diarrhea, but some constipation.  Patient reports constipation has been an issue for her off and on.  No fever, sweats or chills.  No leg swelling.  No pain or burning with urination.    Of note, patient was diagnosed with mononucleosis two years ago.  When she initially presented with tiredness and fatigue, her work up also was positive for ENOC, but otherwise was unremarkable.         Kidney Disease Hx:       h/o Kidney Problems: No  Family h/o Genetic Kidney Disease: No       h/o HTN: No    Usual BP: 100/70s       h/o Protein in Urine: No  h/o Blood in Urine: No       h/o Kidney Stones: No  h/o Kidney Injury: No       h/o Recurrent UTI: No  h/o Genitourinary Problems: No       h/o Chronic NSAID Use: No         Other Medical Hx:       h/o DM: No   h/o Gestational DM: Not applicable       h/o Preeclampsia:  Not applicable          h/o Gastrointestinal, Pancreas or Liver Problems: No       h/o Lung or Heart Problems: No       h/o Hematologic Problems: No  h/o Bleeding or Clotting Problems: No       h/o Cancer: No       h/o Infection Problems: No         Skin Cancer Risk:       h/o more than 50 moles: No       h/o extensive sun exposure: No       h/o melanoma: No       Family h/o melanoma: Yes: both paternal and maternal grandfathers         Mental Health Assessment:       h/o Depression: No       h/o Psychiatric Illness: Yes: anxiety, but well controlled on medication       h/o Suicidal Attempt(s): No    ROS:   A comprehensive review of systems was obtained and negative, except as noted in the HPI or PMH.    PMH:   History was taken from the patient as noted below.  Past Medical History:   Diagnosis Date     Anxiety      Hypothyroidism      Mononucleosis      Thyroid nodule, cold     s/p partial thyroidectomy     UTI (urinary tract infection)        PSH:   Past Surgical History:   Procedure Laterality Date     THYROIDECTOMY      partial     Personal or family history of anesthesia problems: No    Family Hx:   Family History   Problem Relation Age of Onset     Coronary Artery Disease Maternal Grandfather      Melanoma Maternal Grandfather      DIABETES Paternal Grandmother      Melanoma Paternal Grandfather      KIDNEY DISEASE No family hx of      Hypertension No family hx of           Specific Family Hx:       FH of DM: No       FH of CAD: No  FH of HTN: No       FH of Cancer: No  FH of Kidney Cancer: No    Personal Hx:   Social History     Social History     Marital status:      Spouse name: N/A     Number of children: 0     Years of education: N/A     Occupational History     Not on file.     Social History Main Topics     Smoking status: Never Smoker     Smokeless tobacco: Never Used     Alcohol use 1.8 oz/week     3 Standard drinks or equivalent per week     Drug use: No     Sexual activity: Not on file  weeks (around 10/10/2024).    ____________________  This document has been electronically signed by Esthela Velasquez LCSW  September 12, 2024 16:12 EDT    Part of this note may be an electronic transcription/translation of spoken language to printed text using the Dragon Dictation System.   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 Authorizing Provider   levothyroxine (SYNTHROID) 112 MCG tablet     Reported, Patient   ALPRAZolam (XANAX) 0.5 MG tablet TAKE 1 TABLET BY MOUTH UP THREE TIMES DAILY IN 24 HOURS AS NEEDED FOR ANXIETY 17   Reported, Patient       Vitals:  Vital Signs 3/9/2018 3/9/2018 3/9/2018   Systolic 118 110 107   Diastolic 71 71 72   Pulse 77 - -   Temperature 97.9 - -   Respirations 20 - -   Weight (LB) 183 lb 8 oz - -   Height 5' 5\" - -   BMI (Calculated) 30.6 - -   Pain - - -   O2 100 - -       Exam:   GENERAL APPEARANCE: alert and no distress  HENT: mouth without ulcers or lesions  LYMPHATICS: no cervical or supraclavicular nodes  RESP: lungs clear to auscultation - no rales, rhonchi or wheezes  CV: regular rhythm, normal rate, no rub, no murmur  EDEMA: no LE edema bilaterally  ABDOMEN: soft, nondistended, nontender, bowel sounds normal  MS: extremities normal - no gross deformities noted, no evidence of inflammation in joints, no muscle tenderness  SKIN: no rash    Results:   Labs and imaging were ordered for this visit and reviewed by me.  Recent Results (from the past 336 hour(s))   Routine UA with microscopic    Collection Time: 18  6:52 AM   Result Value Ref Range    Color Urine Colorless     Appearance Urine Clear     Glucose Urine Negative NEG^Negative mg/dL    Bilirubin Urine Negative NEG^Negative    Ketones Urine Negative NEG^Negative mg/dL    Specific Gravity Urine 1.003 1.003 - " 1.035    Blood Urine Negative NEG^Negative    pH Urine 6.0 5.0 - 7.0 pH    Protein Albumin Urine Negative NEG^Negative mg/dL    Urobilinogen mg/dL 0.0 0.0 - 2.0 mg/dL    Nitrite Urine Negative NEG^Negative    Leukocyte Esterase Urine Negative NEG^Negative    Source Midstream Urine     WBC Urine 0 0 - 5 /HPF    RBC Urine 0 0 - 2 /HPF    Mucous Urine Present (A) NEG^Negative /LPF   Albumin Random Urine Quantitative with Creat Ratio    Collection Time: 03/09/18  6:52 AM   Result Value Ref Range    Creatinine Urine 20 mg/dL    Albumin Urine mg/L <5 mg/L    Albumin Urine mg/g Cr Unable to calculate due to low value 0 - 25 mg/g Cr   Protein  random urine with Creat Ratio    Collection Time: 03/09/18  6:52 AM   Result Value Ref Range    Protein Random Urine <0.05 g/L    Protein Total Urine g/gr Creatinine Unable to calculate due to low value 0 - 0.2 g/g Cr   ABO/Rh type and screen    Collection Time: 03/09/18  6:55 AM   Result Value Ref Range    ABO O     RH(D) Neg     Antibody Screen Neg     Test Valid Only At          [facility]    Specimen Expires 03/12/2018    CMV Antibody IgG    Collection Time: 03/09/18  6:55 AM   Result Value Ref Range    CMV Antibody IgG 5.5 (H) 0.0 - 0.8 AI   EBV Capsid Antibody IgG    Collection Time: 03/09/18  6:55 AM   Result Value Ref Range    EBV Capsid Antibody IgG 7.3 (H) 0.0 - 0.8 AI   Hepatitis B core antibody    Collection Time: 03/09/18  6:55 AM   Result Value Ref Range    Hepatitis B Core Nancy Nonreactive NR^Nonreactive   Hepatitis B Surface Antibody    Collection Time: 03/09/18  6:55 AM   Result Value Ref Range    Hepatitis B Surface Antibody 39.98 (H) <8.00 m[IU]/mL   Hepatitis B surface antigen    Collection Time: 03/09/18  6:55 AM   Result Value Ref Range    Hep B Surface Agn Nonreactive NR^Nonreactive   Hepatitis C antibody    Collection Time: 03/09/18  6:55 AM   Result Value Ref Range    Hepatitis C Antibody Nonreactive NR^Nonreactive    HIV Antigen Antibody Combo Pretransplant    Collection Time: 03/09/18  6:55 AM   Result Value Ref Range    HIV Antigen Antibody Combo Pretransplant Nonreactive NR^Nonreactive   Anti Treponema    Collection Time: 03/09/18  6:55 AM   Result Value Ref Range    Treponema pallidum Antibody Negative NEG^Negative   Lipid Profile    Collection Time: 03/09/18  6:55 AM   Result Value Ref Range    Cholesterol 135 <200 mg/dL    Triglycerides 32 <150 mg/dL    HDL Cholesterol 66 >49 mg/dL    LDL Cholesterol Calculated 63 <100 mg/dL    Non HDL Cholesterol 69 <130 mg/dL   Comprehensive metabolic panel    Collection Time: 03/09/18  6:55 AM   Result Value Ref Range    Sodium 137 133 - 144 mmol/L    Potassium 4.0 3.4 - 5.3 mmol/L    Chloride 103 94 - 109 mmol/L    Carbon Dioxide 27 20 - 32 mmol/L    Anion Gap 6 3 - 14 mmol/L    Glucose 82 70 - 99 mg/dL    Urea Nitrogen 15 7 - 30 mg/dL    Creatinine 0.78 0.52 - 1.04 mg/dL    GFR Estimate 86 >60 mL/min/1.7m2    GFR Estimate If Black >90 >60 mL/min/1.7m2    Calcium 8.6 8.5 - 10.1 mg/dL    Bilirubin Total 0.6 0.2 - 1.3 mg/dL    Albumin 3.8 3.4 - 5.0 g/dL    Protein Total 7.1 6.8 - 8.8 g/dL    Alkaline Phosphatase 45 40 - 150 U/L    ALT 34 0 - 50 U/L    AST 29 0 - 45 U/L   HCG quantitative pregnancy    Collection Time: 03/09/18  6:55 AM   Result Value Ref Range    HCG Quantitative Serum <1 0 - 5 IU/L   Phosphorus    Collection Time: 03/09/18  6:55 AM   Result Value Ref Range    Phosphorus 3.2 2.5 - 4.5 mg/dL   Hemoglobin A1c    Collection Time: 03/09/18  6:55 AM   Result Value Ref Range    Hemoglobin A1C 5.3 4.3 - 6.0 %   INR    Collection Time: 03/09/18  6:55 AM   Result Value Ref Range    INR 1.01 0.86 - 1.14   Partial thromboplastin time    Collection Time: 03/09/18  6:55 AM   Result Value Ref Range    PTT 27 22 - 37 sec   CBC with platelets    Collection Time: 03/09/18  6:55 AM   Result Value Ref Range    WBC 6.2 4.0 - 11.0 10e9/L    RBC Count 3.99 3.8 - 5.2 10e12/L    Hemoglobin  12.7 11.7 - 15.7 g/dL    Hematocrit 38.4 35.0 - 47.0 %    MCV 96 78 - 100 fl    MCH 31.8 26.5 - 33.0 pg    MCHC 33.1 31.5 - 36.5 g/dL    RDW 12.4 10.0 - 15.0 %    Platelet Count 247 150 - 450 10e9/L   Complement C3    Collection Time: 03/09/18  6:55 AM   Result Value Ref Range    Complement C3 90 76 - 169 mg/dL   Complement C4    Collection Time: 03/09/18  6:55 AM   Result Value Ref Range    Complement C4 17 15 - 50 mg/dL   Uric acid    Collection Time: 03/09/18  6:55 AM   Result Value Ref Range    Uric Acid 5.0 2.6 - 6.0 mg/dL   EKG 12-lead complete w/read - Clinics    Collection Time: 03/09/18  7:13 AM   Result Value Ref Range    Interpretation ECG Click View Image link to view waveform and result

## 2024-09-12 ENCOUNTER — OFFICE VISIT (OUTPATIENT)
Dept: BEHAVIORAL HEALTH | Facility: CLINIC | Age: 39
End: 2024-09-12
Payer: COMMERCIAL

## 2024-09-12 DIAGNOSIS — F41.1 GAD (GENERALIZED ANXIETY DISORDER): Primary | ICD-10-CM

## 2024-09-19 NOTE — TELEPHONE ENCOUNTER
Caller: Laurel Molina    Relationship to patient: Self    Best call back number: [phone]    Patient is needing: PATIENT WAS SCHEDULED FOR THE FIRST AVAILABLE APPOINTMENT ON 6.6.23 WITH MICHA CAMP.     PATIENT STATES THIS IS A LITTLE FAR OUT FOR HER AND WOULD LIKE TO SEE MICHA HEATH IF POSSIBLE. PATIENT IS ASKING TO  BE FIT IN ON MICHA HEATH SCHEDULE IF POSSIBLE PLEASE CALL BACK TO ADVISE LEAVE A DETAILED MESSAGE IF NO ANSWER.        
Pt notified that is the soonest available   
Wear the Edwards Compression Bandage that was applied.  Use the walking boot.  Rest and elevate the foot.  See additional info below.    Follow up with Dr. Enrico Palacios (or the podiatrist of your choice) within 1 week    Office information:          [facility] Address- [address], [place] [address] Phone: [phone]         [facility] Address- [address], [place] [address] Phone: [phone]    =========================  Ankle Sprain    WHAT YOU NEED TO KNOW:    What is an ankle sprain? An ankle sprain happens when 1 or more ligaments in your ankle joint stretch or tear. Ligaments are tough tissues that connect bones. Ligaments support your joints and keep your bones in place.    What are the signs and symptoms of an ankle sprain?    Trouble moving your ankle or foot    Pain when you touch or put weight on your ankle    Bruised, swollen, or misshapen ankle  How is an ankle sprain diagnosed? Your healthcare provider will ask you about your injury and examine you. Tell him or her if you heard a snap or pop when you were injured. Your healthcare provider will check the movement and strength of your joint. You may be asked to move the joint yourself. Tell a healthcare provider if you have ever had an allergic reaction to contrast liquid. You may need any of the following:    An x-ray takes pictures of the bones and tissues in your joints. You may be given contrast liquid as a shot into your joint before the x-ray. This contrast liquid will help your joint show up better on the x-ray.    An MRI may show the sprain. You may be given contrast liquid to help the pictures show up better. Do not enter the MRI room with anything metal. Metal can cause serious injury. Tell a healthcare provider if you have any metal in or on your body.  How is an ankle sprain treated?    Support devices, such as a brace, cast, or splint, may be needed to limit your movement and protect your joint. You may need to use crutches to decrease your pain as you move around.    Medicines:  NSAIDs, such as ibuprofen, help decrease swelling, pain, and fever. This medicine is available with or without a doctor's order. NSAIDs can cause stomach bleeding or kidney problems in certain people. If you take blood thinner medicine, always ask your healthcare provider if NSAIDs are safe for you. Always read the medicine label and follow directions.    Acetaminophen decreases pain and fever. It is available without a doctor's order. Ask how much to take and how often to take it. Follow directions. Read the labels of all other medicines you are using to see if they also contain acetaminophen, or ask your doctor or pharmacist. Acetaminophen can cause liver damage if not taken correctly.    Prescription pain medicine may be given. Ask your healthcare provider how to take this medicine safely. Some prescription pain medicines contain acetaminophen. Do not take other medicines that contain acetaminophen without talking to your healthcare provider. Too much acetaminophen may cause liver damage. Prescription pain medicine may cause constipation. Ask your healthcare provider how to prevent or treat constipation.    Physical therapy may be recommended. A physical therapist teaches you exercises to help improve movement and strength, and to decrease pain.    Surgery may be needed to repair or replace a torn ligament if your sprain does not heal with other treatments. Your healthcare provider may use screws to attach the bones in your ankle together. The screws may help support your ankle and make it stable. Ask your healthcare provider for more information about surgery to treat your ankle sprain.  How can I manage my ankle sprain?    Rest your ankle so that it can heal. Return to normal activities as directed.    Apply ice on your ankle for 15 to 20 minutes every hour or as directed. Use an ice pack, or put crushed ice in a plastic bag. Cover the ice pack or bag with a towel before you put it on your injury. Ice helps prevent tissue damage and decreases swelling and pain.    Compress your ankle. Ask if you should wrap an elastic bandage around your injured ligament. An elastic bandage provides support and helps decrease swelling and movement so your joint can heal. Wear as long as directed.    Elevate your ankle above the level of your heart as often as you can. This will help decrease swelling and pain. Prop your ankle on pillows or blankets to keep it elevated comfortably.    How can I prevent another ankle sprain?    Let your ankle heal. Find out how long your ligament needs to heal. Do not do any physical activity until your healthcare provider says it is okay. If you start activity too soon, you may develop a more serious injury.    Warm up and stretch before you exercise or play sports. This helps your joints become strong and flexible.    Use the right equipment. Always wear shoes that fit well and are made for the activity that you are doing. You may also need ankle supports, elbow and knee pads, or braces.  When should I seek immediate care?    You have severe pain in your ankle.    Your foot or toes are cold or numb.    Your ankle becomes more weak or unstable (wobbly).    You are unable to put any weight on your ankle or foot.    Your swelling has increased or returned.  When should I call my doctor?    Your pain does not go away, even after treatment.    You have questions or concerns about your condition or care.  CARE AGREEMENT:    You have the right to help plan your care. Learn about your health condition and how it may be treated. Discuss treatment options with your healthcare providers to decide what care you want to receive. You always have the right to refuse treatment.

## 2024-09-29 DIAGNOSIS — F41.1 GAD (GENERALIZED ANXIETY DISORDER): ICD-10-CM

## 2024-09-29 DIAGNOSIS — F41.9 ANXIETY: ICD-10-CM

## 2024-09-29 DIAGNOSIS — F33.1 MAJOR DEPRESSIVE DISORDER, RECURRENT, MODERATE: ICD-10-CM

## 2024-09-30 DIAGNOSIS — F41.1 GAD (GENERALIZED ANXIETY DISORDER): ICD-10-CM

## 2024-09-30 RX ORDER — HYDROXYZINE HYDROCHLORIDE 25 MG/1
25 TABLET, FILM COATED ORAL
Qty: 90 TABLET | Refills: 0 | Status: SHIPPED | OUTPATIENT
Start: 2024-09-30

## 2024-09-30 RX ORDER — DULOXETIN HYDROCHLORIDE 60 MG/1
60 CAPSULE, DELAYED RELEASE ORAL DAILY
Qty: 90 CAPSULE | Refills: 3 | OUTPATIENT
Start: 2024-09-30

## 2024-09-30 RX ORDER — DULOXETIN HYDROCHLORIDE 30 MG/1
30 CAPSULE, DELAYED RELEASE ORAL DAILY
Qty: 90 CAPSULE | Refills: 0 | Status: SHIPPED | OUTPATIENT
Start: 2024-09-30

## 2024-09-30 NOTE — TELEPHONE ENCOUNTER
NEXT VISIT WITH PROVIDER   Appointment with Tejinder Amezcua MD (11/21/2024)     LAST SEEN BY PROVIDER   Office Visit with Tejinder Amezcua MD (08/28/2024)     LAST MED REFILL  DULoxetine (CYMBALTA) 30 MG capsule (06/19/2024)    DULoxetine (CYMBALTA) 60 MG capsule (07/11/2024)  TOO SOON FOR REFILL     PROVIDER PLEASE ADVISE

## 2024-09-30 NOTE — TELEPHONE ENCOUNTER
NEXT VISIT WITH PROVIDER   Appointment with Tejinder Amezcua MD (11/21/2024)     LAST SEEN BY PROVIDER   Office Visit with Tejinder Amezcua MD (08/28/2024)     LAST MED REFILL  hydrOXYzine (ATARAX) 25 MG tablet (08/28/2024)     PROVIDER PLEASE ADVISE

## 2024-10-29 ENCOUNTER — LAB (OUTPATIENT)
Dept: LAB | Facility: HOSPITAL | Age: 39
End: 2024-10-29
Payer: COMMERCIAL

## 2024-10-29 DIAGNOSIS — F90.2 ATTENTION DEFICIT HYPERACTIVITY DISORDER (ADHD), COMBINED TYPE: ICD-10-CM

## 2024-10-29 DIAGNOSIS — Z51.81 THERAPEUTIC DRUG MONITORING: ICD-10-CM

## 2024-10-29 LAB
AMPHET+METHAMPHET UR QL: NEGATIVE
AMPHETAMINES UR QL: NEGATIVE
BARBITURATES UR QL SCN: NEGATIVE
BENZODIAZ UR QL SCN: NEGATIVE
BUPRENORPHINE SERPL-MCNC: NEGATIVE NG/ML
CANNABINOIDS SERPL QL: NEGATIVE
COCAINE UR QL: NEGATIVE
FENTANYL UR-MCNC: NEGATIVE NG/ML
METHADONE UR QL SCN: NEGATIVE
OPIATES UR QL: NEGATIVE
OXYCODONE UR QL SCN: NEGATIVE
PCP UR QL SCN: NEGATIVE
TRICYCLICS UR QL SCN: NEGATIVE

## 2024-10-29 PROCEDURE — 80307 DRUG TEST PRSMV CHEM ANLYZR: CPT

## 2024-10-29 RX ORDER — DEXTROAMPHETAMINE SACCHARATE, AMPHETAMINE ASPARTATE, DEXTROAMPHETAMINE SULFATE AND AMPHETAMINE SULFATE 5; 5; 5; 5 MG/1; MG/1; MG/1; MG/1
20 TABLET ORAL 2 TIMES DAILY
Qty: 60 TABLET | Refills: 0 | Status: SHIPPED | OUTPATIENT
Start: 2024-10-29

## 2024-10-29 RX ORDER — DEXTROAMPHETAMINE SACCHARATE, AMPHETAMINE ASPARTATE, DEXTROAMPHETAMINE SULFATE AND AMPHETAMINE SULFATE 5; 5; 5; 5 MG/1; MG/1; MG/1; MG/1
20 TABLET ORAL 2 TIMES DAILY
Qty: 60 TABLET | Refills: 0 | OUTPATIENT
Start: 2024-10-29

## 2024-10-29 NOTE — TELEPHONE ENCOUNTER
Appointment with Tejinder Amezcua MD (11/21/2024)     Urine Drug Screen - Urine, Clean Catch (06/03/2024 16:04)     Pt said she would get UDS ASAP    CONTROLLED SUBSTANCE AGREEMENT - SCAN - CONTROLLED SUBTANCE AGREEMENT, BEHAVIORAL HEALTH, 07/19/2023 (07/19/2023)     PT NOTIFIED THAT SHE NEEDS CSA SIGNED AT UPCOMING APPT    NOTED IN UPCOMING APPT NOTES

## 2024-11-01 ENCOUNTER — OFFICE VISIT (OUTPATIENT)
Dept: BEHAVIORAL HEALTH | Facility: CLINIC | Age: 39
End: 2024-11-01
Payer: COMMERCIAL

## 2024-11-01 DIAGNOSIS — F41.1 GAD (GENERALIZED ANXIETY DISORDER): Primary | ICD-10-CM

## 2024-11-01 NOTE — PROGRESS NOTES
"Progress Note    Date: 11/01/2024  Time In: 9:10  Time Out: 10:00    Patient Legal Name: Laurel Molina  Patient Age: 39 y.o.    Mode of visit: In person  Location of provider: [address], [place] [address]  Location of patient: Office      CHIEF COMPLAINT: anxiety    Subjective   History of Present Illness   Laurel is a 39 y.o. female who presents today as a follow-up for continued psychotherapy. Patient checked in a few minutes late today. Patient reported her mom is doing better and got to come home but she has to have another surgery coming up soon. Patient stated her anxiety is due to school and work right now.  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 Patient expressed some frustrations about not getting to use her skills at work but noted she has started asserting herself at work.  Patient described being focused on getting her school work done and hopes to be done by the end of the year. Patient explained she has been trying to do more \"resting and reading\" to help manage stress. Patient advised she continues to use positive self-talk. Patient described having occasional \"moments of depression.\" Patient stated she still struggles with accepting when good things happen. Patient is voluntarily requesting to participate in outpatient therapy at [facility]. Patient rated her anxiety at 6 today on a 0-10 scale where 0= no symptoms.       History obtained from referring provider's note on 5/6/24:  Psychiatric History:  Diagnoses: ADD, anxiety, depression  Outpatient history: denies  Inpatient history: denies  Medication trials: sertraline (worked okay), bupropion (ineffective 150 mg), buspirone (didn't seem to have an impact; was taking once daily)  Other treatment modalities: has not done therapy in the past  Presenting regimen: atomoxetine 40 mg QD, duloxetine 60 mg QD, hydroxyzine 25 mg HS, trazodone 50 mg HS  Self harm: years ago " cut  Suicide attempts: denies    Assessment    Mental Status Exam     Appearance: good hygiene and dressed appropriately for the weather  Behavior: calm  Cooperation:  engaged, cooperative, attentive, and friendly  Eye Contact:  good  Affect:  congruent  Mood: expressive  Speech: responsive  Thought Process:  organized  Thought Content: appropriate  Suicidal: denies  Homicidal:  denies  Hallucinations:  denies  Memory:  intact  Orientation:  person, place, time, and situation  Reliability:  reliable  Insight:  good  Judgment:  good    Clinical Intervention       ICD-10-CM ICD-9-CM   1. VIOLETTE (generalized anxiety disorder)  F41.1 300.02        Individual psychotherapy was provided utilizing CBT and person-centered techniques to build rapport, encourage expression of thoughts and feelings, support self-esteem, establish new coping skills, acknowledge sources of feelings and behaviors, assess symptoms, challenge negative thinking patterns, and provide psychoeducation.  Therapist utilized open-ended questions to encourage the development of a positive therapeutic relationship and open communication.  Therapist normalized/validated patient’s thoughts and feelings as appropriate. Patient rated her anxiety at 6 today on a 0-10 scale where 0= no symptoms. Discussed accepting that good things can happen and not focusing on the bad things. Advised patient that this therapist will be moving to a new location at the first of the year.    Plan   Plan & Goals     Moving forward, we will continue to build rapport and reinforce and build upon effective coping strategies utilizing CBT and person-centered techniques.    Patient acknowledged and verbally consented to continue working toward resolving current treatment plan goals and was educated on the importance of participation in the therapeutic process.  Patient will remain compliant with medication regimen as prescribed. Discuss any medication side effects, questions or concerns  with prescribing provider.  Call 911 or present to the nearest emergency room in an emergency situation.  National Suicide Prevention Lifeline: Call 988. The Lifeline provides 24/7, free and confidential support for people in distress, prevention and crisis resources.  Crisis Text Line  Text HOME To 984764    Return in about 4 weeks (around 11/29/2024).    ____________________  This document has been electronically signed by Esthela Velasquez LCSW  November 1, 2024 11:22 EDT    Part of this note may be an electronic transcription/translation of spoken language to printed text using the Dragon Dictation System.

## 2024-11-04 ENCOUNTER — TELEPHONE (OUTPATIENT)
Dept: PSYCHIATRY | Facility: CLINIC | Age: 39
End: 2024-11-04
Payer: COMMERCIAL

## 2024-11-21 ENCOUNTER — OFFICE VISIT (OUTPATIENT)
Dept: PSYCHIATRY | Facility: CLINIC | Age: 39
End: 2024-11-21
Payer: COMMERCIAL

## 2024-11-21 VITALS
WEIGHT: 145.6 LBS | SYSTOLIC BLOOD PRESSURE: 136 MMHG | HEIGHT: 64 IN | HEART RATE: 101 BPM | OXYGEN SATURATION: 100 % | BODY MASS INDEX: 24.86 KG/M2 | DIASTOLIC BLOOD PRESSURE: 86 MMHG

## 2024-11-21 DIAGNOSIS — F41.1 GAD (GENERALIZED ANXIETY DISORDER): ICD-10-CM

## 2024-11-21 DIAGNOSIS — F33.41 MAJOR DEPRESSIVE DISORDER, RECURRENT EPISODE, IN PARTIAL REMISSION: ICD-10-CM

## 2024-11-21 DIAGNOSIS — F33.1 MAJOR DEPRESSIVE DISORDER, RECURRENT, MODERATE: ICD-10-CM

## 2024-11-21 DIAGNOSIS — F90.2 ATTENTION DEFICIT HYPERACTIVITY DISORDER (ADHD), COMBINED TYPE: Primary | ICD-10-CM

## 2024-11-21 DIAGNOSIS — F41.9 ANXIETY: ICD-10-CM

## 2024-11-21 RX ORDER — HYDROXYZINE HYDROCHLORIDE 25 MG/1
25 TABLET, FILM COATED ORAL
Qty: 90 TABLET | Refills: 0 | Status: SHIPPED | OUTPATIENT
Start: 2024-11-21

## 2024-11-21 RX ORDER — DULOXETIN HYDROCHLORIDE 30 MG/1
30 CAPSULE, DELAYED RELEASE ORAL DAILY
Qty: 90 CAPSULE | Refills: 0 | Status: SHIPPED | OUTPATIENT
Start: 2024-11-21

## 2024-11-21 RX ORDER — DEXTROAMPHETAMINE SACCHARATE, AMPHETAMINE ASPARTATE, DEXTROAMPHETAMINE SULFATE AND AMPHETAMINE SULFATE 5; 5; 5; 5 MG/1; MG/1; MG/1; MG/1
20 TABLET ORAL
Qty: 60 TABLET | Refills: 0 | Status: SHIPPED | OUTPATIENT
Start: 2024-11-21

## 2024-11-21 RX ORDER — DEXTROAMPHETAMINE SACCHARATE, AMPHETAMINE ASPARTATE, DEXTROAMPHETAMINE SULFATE AND AMPHETAMINE SULFATE 7.5; 7.5; 7.5; 7.5 MG/1; MG/1; MG/1; MG/1
30 TABLET ORAL EVERY MORNING
Qty: 30 TABLET | Refills: 0 | Status: SHIPPED | OUTPATIENT
Start: 2024-11-21

## 2024-11-21 NOTE — PROGRESS NOTES
"[facility]  Follow-up Visit    Chief Complaint: \"I went to the doctor for having trouble focusing... doing school, work... in the process of moving... I get overwhelmed and don't actually start on something... get distracted and do something else... need to sit down and study... focus on what I'm doing... hard to get started doing that.\"     History of Present Illness: Laurel Molina is a 39 y.o. female who presents today for follow-up regarding ADHD, VIOLETTE, MDD. Last seen: 08/28 at which time no changes were made to her regimen. She presents unaccompanied in no acute distress and engages with me appropriately. Psychotropic regimen perceived to be partially effective. Side-effects per given history: denies.    Current treatment regimen includes:   - amphetamine 20 mg BID  - duloxetine 90 mg QD  - hydroxyzine 25 mg HS  - trazodone 50 mg HS    Today Laurel reports she's doing okay, though a cousin to whom she was close recently passed. She has taken a respite from school to accommodate recent stressors and demands on her time/attention. She's planning to get back next month. Symptoms are exacerbated given circumstances, but are largely manageable. ADHD symptoms are partially managed with current interventions; she requests regimen optimization on this front. Anxiety symptoms are fairly managed with current interventions. Depression symptoms are fairly managed with current interventions. Thought process and content are devoid of overt aberration suggestive of acute tierney/psychosis. The patient denies SI/HI/AVH. There are no overt changes on exam today compared to most recent evaluation.  - contextual changes: mother was in the ICU for a ruptured brain aneurysm and has been recovering since (has another aneurysm that will be monitored); has continued creative outlets for engagement; has been working with crystals and herbs (uses different types depending on circumstances call " for)  - sleep: generally adequate - no change  - appetite: generally adequate, no change; -2 lb since last visit    I have counseled the patient with regard to diagnoses and the recommended treatment regimen as documented below. Patient acknowledges the diagnoses per my rendered interpretation. Patient demonstrates understanding of potential risks/benefits/side effects associated with this regimen and is amenable to proceed in this fashion.     Psychotherapy  - Time: 18 minutes  - interventions employed: the therapeutic alliance was strengthened to encourage the patient to express their thoughts and feelings freely. Esteem building was enhanced through praise, reassurance, normalizing/challenging, and encouragement as appropriate. Coping skills were enhanced to build distress tolerance skills and emotional regulation. Allowed patient to freely discuss issues without interruption or judgement with unconditional positive regard, active listening skills, and empathy. Provided a safe, confidential environment to facilitate the development of a positive therapeutic relationship and encourage open, honest communication. Assisted patient in processing session content; acknowledged and normalized/addressed, as appropriate, patient’s thoughts, feelings, and concerns by utilizing a person-centered approach in efforts to build appropriate rapport and a positive therapeutic relationship with open and honest communication.   - Diagnoses: see assessment and plan below  - Symptoms: see subjective above  - Goals   - patient: cultivate concentration and focus to improve functional capacity, sustain mood and manageable anxiety levels, nourish creativity   - provider: challenge patterns of living conducive to pathology, strengthen defenses, promote problems solving, restore adaptive functioning and provide symptom relief.  - Treatment plan: continue supportive psychotherapy in subsequent appointments to provide symptom relief; see  assessment and plan below for additional details:   - iteration: 1   - progress: good   - (X)illumination, (X)contextualization, (X)detection, (X)development, (working)elaboration, (working)refinement  - functional status: good  - mental status exam: as below  - prognosis: good    Psychiatric History:  Diagnoses: ADD, anxiety, depression  Outpatient history: denies  Inpatient history: denies  Medication trials: sertraline (worked okay), bupropion (ineffective 150 mg), buspirone (didn't seem to have an impact; was taking once daily)  Other treatment modalities: has not done therapy in the past  Presenting regimen: atomoxetine 40 mg QD, duloxetine 60 mg QD, hydroxyzine 25 mg HS, trazodone 50 mg HS  Self harm: years ago cut  Suicide attempts: denies     Social History:  Residence: lives in a house with her mother; was living with boyfriend and they had an argument eliciting the change in living for the time being; does not have children  Vocation: Count includes the [employer] ([employer])  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, 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)  Pertinent developmental history: issues with math growing up; denies abuse hx  Pertinent legal history: denies  Hobbies/interests: reading (historical fiction, gothic horror), art (drawing and painting), music (clarinet and piano)  Gnosticism: denies  Exercise: yes, walks three times weekly  Dietary habits: defer  Sleep hygiene: defer  Social habits: no pertinent issues  Sunlight: no concern for under-exposure  Caffeine intake: coffee in the AM with an energy drink, occasional soda, occasional tea  Hydration habits: not enough water   history: denies    Social History     Socioeconomic History    Marital status: Single   Tobacco Use    Smoking status: Never     Passive exposure: Past    Smokeless tobacco: Never   Vaping Use    Vaping status: Never Used   Substance and Sexual Activity    Alcohol use: Never    Drug use: Never    Sexual activity: Yes      Partners: Male     Birth control/protection: None     Tobacco use counseling/intervention: N/A, patient does not use tobacco; patient has been counseled with regard to risks of tobacco use.    PHQ-9 Depression Screening  PHQ-9 Total Score:      Little interest or pleasure in doing things?     Feeling down, depressed, or hopeless?     Trouble falling or staying asleep, or sleeping too much?     Feeling tired or having little energy?     Poor appetite or overeating?     Feeling bad about yourself - or that you are a failure or have let yourself or your family down?     Trouble concentrating on things, such as reading the newspaper or watching television?     Moving or speaking so slowly that other people could have noticed? Or the opposite - being so fidgety or restless that you have been moving around a lot more than usual?     Thoughts that you would be better off dead, or of hurting yourself in some way?     PHQ-9 Total Score       Change in PHQ-9 since last measure: N/A (16)    VIOLETTE-7       Change in VIOLETTE-7 since last measure: N/A (19)    Problem List:  Patient Active Problem List   Diagnosis    Stroke    VIOLETTE (generalized anxiety disorder)    History of stroke    Cat bite    Attention deficit hyperactivity disorder (ADHD), combined type    Major depressive disorder, recurrent episode, in partial remission     Allergy:   Allergies   Allergen Reactions    Naproxen Hives and Anaphylaxis      Discontinued Medications:  There are no discontinued medications.    Current Medications:   Current Outpatient Medications   Medication Sig Dispense Refill    amphetamine-dextroamphetamine (ADDERALL) 20 MG tablet Take 1 tablet by mouth 2 (Two) Times a Day. 60 tablet 0    aspirin 81 MG EC tablet Take 1 tablet by mouth Daily. 90 tablet 1    DULoxetine (CYMBALTA) 30 MG capsule TAKE 1 CAPSULE BY MOUTH EVERY DAY 90 capsule 0    DULoxetine (CYMBALTA) 60 MG capsule TAKE 1 CAPSULE BY MOUTH EVERY DAY 90 capsule 3    hydrOXYzine (ATARAX) 25  MG tablet TAKE 1 TABLET BY MOUTH EVERYDAY AT BEDTIME 90 tablet 0    ibuprofen (ADVIL,MOTRIN) 800 MG tablet Take 1 tablet by mouth 2 (Two) Times a Day As Needed.      traZODone (DESYREL) 50 MG tablet TAKE 1 TO 2 TABLETS BY MOUTH AT BEDTIME AS NEEDED FOR INSOMNIA 90 tablet 3     No current facility-administered medications for this visit.     Past Medical History:  Past Medical History:   Diagnosis Date    Acid reflux     ADHD (attention deficit hyperactivity disorder)     Allergic rhinitis     Anxiety     Arthritis     Depression     Hyperlipidemia     Hypertension     Leg pain     Seasonal allergies     Sinus trouble     Sleep disturbance     Stroke 03/16/2021    Thumb fracture 2012    Right     Past Surgical History:  Past Surgical History:   Procedure Laterality Date    ANKLE SURGERY      EYE SURGERY  1989    FRACTURE SURGERY  1/10/2014    Kneecap reconstruction    KNEE PCL RECONSTRUCTION Left 2014    open ligament     Mental Status Exam:   Appearance: well-groomed, sits upright, age-appropriate, normal habitus  Behavior: calm, cooperative, appropriate in demeanor, appropriate eye-contact  Mood/affect: fair / mood-congruent and appropriate in both range and amplitude  Speech: within expected variance; appropriate rate, appropriate rhythm, appropriate tone; non-pressured  Thought Process: linear, goal-directed; no FOI or ANASTACIA; abstraction intact  Thought Content: coherent, devoid of overt delusions/perceptual disturbances  SI/HI: denies both SI and HI; exhibits future-orientation, self-advocates appropriately, no regular self-harm, no appreciable intent  Memory: no overt deficits  Orientation: oriented to person/place/time/situation  Concentration: appropriate during interview; +subjective deficits  Intellectual capacity: presumptively average  Insight: fair by given history/exam  Judgment: appropriate by given history/exam  Psychomotor: no appreciable latency/retardation/agitation/tremor  Gait: WNL    Review of  "Systems:  Review of Systems    Vital Signs:   /86   Pulse 101   Ht 162.6 cm (64.02\")   Wt 66 kg (145 lb 9.6 oz)   SpO2 100%   BMI 24.98 kg/m²      Lab Results:   Lab on 10/29/2024   Component Date Value Ref Range Status    THC, Screen, Urine 10/29/2024 Negative  Negative Final    Phencyclidine (PCP), Urine 10/29/2024 Negative  Negative Final    Cocaine Screen, Urine 10/29/2024 Negative  Negative Final    Methamphetamine, Ur 10/29/2024 Negative  Negative Final    Opiate Screen 10/29/2024 Negative  Negative Final    Amphetamine Screen, Urine 10/29/2024 Negative  Negative Final    Benzodiazepine Screen, Urine 10/29/2024 Negative  Negative Final    Tricyclic Antidepressants Screen 10/29/2024 Negative  Negative Final    Methadone Screen, Urine 10/29/2024 Negative  Negative Final    Barbiturates Screen, Urine 10/29/2024 Negative  Negative Final    Oxycodone Screen, Urine 10/29/2024 Negative  Negative Final    Buprenorphine, Screen, Urine 10/29/2024 Negative  Negative Final    Fentanyl, Urine 10/29/2024 Negative  Negative Final   Office Visit on 08/07/2024   Component Date Value Ref Range Status    TSH 08/07/2024 0.734  0.270 - 4.200 uIU/mL Final    WBC 08/07/2024 7.13  3.40 - 10.80 10*3/mm3 Final    RBC 08/07/2024 4.20  3.77 - 5.28 10*6/mm3 Final    Hemoglobin 08/07/2024 12.3  12.0 - 15.9 g/dL Final    Hematocrit 08/07/2024 36.9  34.0 - 46.6 % Final    MCV 08/07/2024 87.9  79.0 - 97.0 fL Final    MCH 08/07/2024 29.3  26.6 - 33.0 pg Final    MCHC 08/07/2024 33.3  31.5 - 35.7 g/dL Final    RDW 08/07/2024 12.4  12.3 - 15.4 % Final    RDW-SD 08/07/2024 38.9  37.0 - 54.0 fl Final    MPV 08/07/2024 10.9  6.0 - 12.0 fL Final    Platelets 08/07/2024 316  140 - 450 10*3/mm3 Final    Glucose 08/07/2024 91  65 - 99 mg/dL Final    BUN 08/07/2024 9  6 - 20 mg/dL Final    Creatinine 08/07/2024 1.04 (H)  0.57 - 1.00 mg/dL Final    Sodium 08/07/2024 138  136 - 145 mmol/L Final    Potassium 08/07/2024 4.3  3.5 - 5.2 mmol/L " Final    Chloride 08/07/2024 105  98 - 107 mmol/L Final    CO2 08/07/2024 24.0  22.0 - 29.0 mmol/L Final    Calcium 08/07/2024 9.6  8.6 - 10.5 mg/dL Final    Total Protein 08/07/2024 7.2  6.0 - 8.5 g/dL Final    Albumin 08/07/2024 4.4  3.5 - 5.2 g/dL Final    ALT (SGPT) 08/07/2024 12  1 - 33 U/L Final    AST (SGOT) 08/07/2024 17  1 - 32 U/L Final    Alkaline Phosphatase 08/07/2024 61  39 - 117 U/L Final    Total Bilirubin 08/07/2024 0.3  0.0 - 1.2 mg/dL Final    Globulin 08/07/2024 2.8  gm/dL Final    A/G Ratio 08/07/2024 1.6  g/dL Final    BUN/Creatinine Ratio 08/07/2024 8.7  7.0 - 25.0 Final    Anion Gap 08/07/2024 9.0  5.0 - 15.0 mmol/L Final    eGFR 08/07/2024 69.4  >60.0 mL/min/1.73 Final    Total Cholesterol 08/07/2024 183  0 - 200 mg/dL Final    Triglycerides 08/07/2024 37  0 - 150 mg/dL Final    HDL Cholesterol 08/07/2024 87 (H)  40 - 60 mg/dL Final    LDL Cholesterol  08/07/2024 88  0 - 100 mg/dL Final    VLDL Cholesterol 08/07/2024 8  5 - 40 mg/dL Final    LDL/HDL Ratio 08/07/2024 1.02   Final   Admission on 07/10/2024, Discharged on 07/10/2024   Component Date Value Ref Range Status    Glucose 07/10/2024 93  65 - 99 mg/dL Final    BUN 07/10/2024 11  6 - 20 mg/dL Final    Creatinine 07/10/2024 0.88  0.57 - 1.00 mg/dL Final    Sodium 07/10/2024 136  136 - 145 mmol/L Final    Potassium 07/10/2024 4.2  3.5 - 5.2 mmol/L Final    Chloride 07/10/2024 101  98 - 107 mmol/L Final    CO2 07/10/2024 23.5  22.0 - 29.0 mmol/L Final    Calcium 07/10/2024 9.4  8.6 - 10.5 mg/dL Final    Total Protein 07/10/2024 7.8  6.0 - 8.5 g/dL Final    Albumin 07/10/2024 4.8  3.5 - 5.2 g/dL Final    ALT (SGPT) 07/10/2024 14  1 - 33 U/L Final    AST (SGOT) 07/10/2024 21  1 - 32 U/L Final    Alkaline Phosphatase 07/10/2024 71  39 - 117 U/L Final    Total Bilirubin 07/10/2024 0.6  0.0 - 1.2 mg/dL Final    Globulin 07/10/2024 3.0  gm/dL Final    A/G Ratio 07/10/2024 1.6  g/dL Final    BUN/Creatinine Ratio 07/10/2024 12.5  7.0 - 25.0 Final     Anion Gap 07/10/2024 11.5  5.0 - 15.0 mmol/L Final    eGFR 07/10/2024 84.8  >60.0 mL/min/1.73 Final    WBC 07/10/2024 12.98 (H)  3.40 - 10.80 10*3/mm3 Final    RBC 07/10/2024 4.16  3.77 - 5.28 10*6/mm3 Final    Hemoglobin 07/10/2024 12.5  12.0 - 15.9 g/dL Final    Hematocrit 07/10/2024 37.4  34.0 - 46.6 % Final    MCV 07/10/2024 89.9  79.0 - 97.0 fL Final    MCH 07/10/2024 30.0  26.6 - 33.0 pg Final    MCHC 07/10/2024 33.4  31.5 - 35.7 g/dL Final    RDW 07/10/2024 13.2  12.3 - 15.4 % Final    RDW-SD 07/10/2024 43.6  37.0 - 54.0 fl Final    MPV 07/10/2024 10.4  6.0 - 12.0 fL Final    Platelets 07/10/2024 312  140 - 450 10*3/mm3 Final    Neutrophil % 07/10/2024 82.6 (H)  42.7 - 76.0 % Final    Lymphocyte % 07/10/2024 8.5 (L)  19.6 - 45.3 % Final    Monocyte % 07/10/2024 7.5  5.0 - 12.0 % Final    Eosinophil % 07/10/2024 0.7  0.3 - 6.2 % Final    Basophil % 07/10/2024 0.3  0.0 - 1.5 % Final    Immature Grans % 07/10/2024 0.4  0.0 - 0.5 % Final    Neutrophils, Absolute 07/10/2024 10.73 (H)  1.70 - 7.00 10*3/mm3 Final    Lymphocytes, Absolute 07/10/2024 1.10  0.70 - 3.10 10*3/mm3 Final    Monocytes, Absolute 07/10/2024 0.97 (H)  0.10 - 0.90 10*3/mm3 Final    Eosinophils, Absolute 07/10/2024 0.09  0.00 - 0.40 10*3/mm3 Final    Basophils, Absolute 07/10/2024 0.04  0.00 - 0.20 10*3/mm3 Final    Immature Grans, Absolute 07/10/2024 0.05  0.00 - 0.05 10*3/mm3 Final    nRBC 07/10/2024 0.0  0.0 - 0.2 /100 WBC Final    Dengue Virus IgM 07/10/2024 1.05  <1.65 ISR Final    Comment: The DENV Detect IgM Capture BRIE is for the qualitative  detection of IgM antibodies to DENV recombinant antigens  (DENRA) in serum for the presumptive clinical laboratory  diagnosis of Dengue virus infection.  An Immune Status Ratio (ISR) value of less than 1.65  indicates there was no detectable IgM antibody present.  The result does not rule out Dengue virus infection. An  additional sample should be tested within 7-14 days if  early infection is  suspected. Other Dengue virus assays  such as Dengue NS1 assays, PCR or culture should be  performed to rule out early acute infection.  ISR values between 1.65 and 2.84 indicate an equivocal  result and Dengue virus IgM antibody cannot be determined,  and should be repeated by an alternative method or another  sample should be collected.  ISR values greater than 2.84 indicate IgM antibodies were  detected. Dengue-positive sera must take into account  other clinical findings and be confirmed with other tests  if indicated.  A positive IgM                            result may not indicate a  recent infection because IgM may persist for several  months after infection.  Serological cross-reactivity across the flavivirus group  is common.  Certain sera from patients infected with Zika,  Japanese Encephalitis, West Nile, and/or Saint Neo  Viruses may give false positive results.  This test has been cleared or approved for diagnostic use  by the U.S. Food and Drug Administration.  FLAG Interpretation: A = Abnormal, H = High, L = Low    Dengue Virus IgG 07/10/2024 1.05  <1.65 ISR Final    An Immune Status Ratio (ISR) value of less than 1.65  indicates there was no detectable IgG antibody present.  ISR values between 1.65 and 2.84 indicate an equivocal  result and we recommend a new sample be collected and  tested.  ISR values greater than 2.84 indicate IgG  antibodies were detected. Dengue-positive sera must take  into account other clinical findings and be confirmed with  other tests if indicated.  Serological cross-reactivity across the flavivirus group  is common.  Certain sera from patients infected with Zika,  Japanese Encephalitis, West Nile, and/or Saint Neo  Viruses may give false positive results.  The performance characteristics of this test have been  determined by [facility].  This test has not been  cleared or approved for diagnostic use by the U.[place]. Food  and Drug Administration.    TERESA  phagocytophilum PCR 07/10/2024 Negative  Negative Final    No Anaplasma phagocytophilum DNA detected.  A. phagocytophilum has been  characterized as the causative agent of Human Granulocytic  Ehrlichiosis (HGE).    Ehrlichia chaffeensis PCR 07/10/2024 Negative  Negative Final    No Ehrlichia chaffeensis DNA detected.  E. chaffeensis has been  characterized as the causative agent of Human Monocytic Ehrlichiosis  (HME).    Rickettsia rickettsii DNA, RT 07/10/2024 Not Detected   Final    REFERENCE RANGE: NOT DETECTED  This test was developed and its analytical performance  characteristics have been determined by [facility].  It has not been cleared or approved by FDA. This assay has  been validated pursuant to the CLIA regulations and is  used for clinical purposes.    Lyme Total Antibody EIA 07/10/2024 Negative  Negative Final    Lyme antibodies not detected. Reflex testing is not indicated.  No laboratory evidence of infection with B. burgdorferi (Lyme disease).  Negative results may occur in patients recently infected (less than  or equal to 14 days) with B. burgdorferi.  If recent infection is  suspected, repeat testing on a new sample collected in 7 to 14 days is  recommended.    Lactate 07/10/2024 1.4  0.5 - 2.0 mmol/L Final    Blood Culture 07/10/2024 No growth at 5 days   Final    Blood Culture 07/10/2024 No growth at 5 days   Final    Color, UA 07/10/2024 Yellow  Yellow, Straw Final    Appearance, UA 07/10/2024 Clear  Clear Final    pH, UA 07/10/2024 6.5  5.0 - 8.0 Final    Specific Gravity, UA 07/10/2024 1.026  1.005 - 1.030 Final    Glucose, UA 07/10/2024 Negative  Negative Final    Ketones, UA 07/10/2024 40 mg/dL (2+) (A)  Negative Final    Bilirubin, UA 07/10/2024 Negative  Negative Final    Blood, UA 07/10/2024 Negative  Negative Final    Protein, UA 07/10/2024 Negative  Negative Final    Leuk Esterase, UA 07/10/2024 Trace (A)  Negative Final    Nitrite, UA 07/10/2024 Negative  Negative Final     Urobilinogen, UA 07/10/2024 1.0 E.U./dL  0.2 - 1.0 E.U./dL Final    Extra Tube 07/10/2024 Hold for add-ons.   Final    Auto resulted.    RBC, UA 07/10/2024 0-2  None Seen, 0-2 /HPF Final    WBC, UA 07/10/2024 0-2  None Seen, 0-2 /HPF Final    Urine culture not indicated.    Bacteria, UA 07/10/2024 None Seen  None Seen /HPF Final    Squamous Epithelial Cells, UA 07/10/2024 0-2  None Seen, 0-2 /HPF Final    Hyaline Casts, UA 07/10/2024 0-2  None Seen /LPF Final    Methodology 07/10/2024 Automated Microscopy   Final   Admission on 07/10/2024, Discharged on 07/10/2024   Component Date Value Ref Range Status    SARS Antigen 07/10/2024 Not Detected  Not Detected, Presumptive Negative Final    Influenza A Antigen JOSÉ 07/10/2024 Not Detected  Not Detected Final    Influenza B Antigen JOSÉ 07/10/2024 Not Detected  Not Detected Final    Internal Control 07/10/2024 Passed  Passed Final    Lot Number 07/10/2024 [id number]   Final    Expiration Date 07/10/2024 2/5/2025   Final     EKG Results:  No orders to display     Imaging Results:  No Images in the past 120 days found.    ASSESSMENT AND PLAN:    ICD-10-CM ICD-9-CM   1. Attention deficit hyperactivity disorder (ADHD), combined type  F90.2 314.01   2. VIOLETTE (generalized anxiety disorder)  F41.1 300.02   3. Major depressive disorder, recurrent episode, in partial remission  F33.41 296.35     39 y.o. female who presents today for follow-up regarding ADHD, VIOLETTE, MDD. We have discussed the interval history and the treatment plan below, including potential R/B/SE of the recommended regimen of which the patient demonstrates understanding. Patient is agreeable to call 911 or go to the nearest ER should she become concerned for her own safety and/or the safety of those around her. There are no overt indices of acute tierney/psychosis on exam today.     Medication regimen: titrate amphetamine to 30 mg QAM + 20 mg QPM - continue duloxetine, hydroxyzine, trazodone; patient is advised not to  misuse prescribed medications or to use them with any exogenous substances that aren't disclosed to this provider as they may interact with the regimen to the patient's detriment.   Risk assessment: protracted risk is low, imminent risk is low - slight interval change. Do note that this is subject to change with the Congregation of new stressors, treatment non-adherence, use of substances, and/or new medical ails.   Monitoring: reviewed labs/imaging as populated above  Therapy: referred  Follow-up: 6-8 weeks  Communications: N/A  Treatment plan: due    TREATMENT PLAN/GOALS: challenge patterns of living conducive to symptom burden, implement recommended regimen as above with augmentative, intermittent supportive psychotherapy to reduce symptom burden. Patient acknowledged and verbally consented to continue treatment. The importance of adherence to the recommended treatment and interval follow-up appointments was again emphasized today: patient has good treatment adherence per given history. Patient was today reminded to limit daily caffeine intake, hydrate appropriately, eat healthy and nutritious foods, engage sleep hygiene measures, engage appropriate exposure to sunlight, engage with hobbies in balance with life necessities, and exercise appropriate to their capacity to do so.     Billing: This encounter is of moderate complexity based on number/complexity of problems addressed today and risk of complications/morbidity: 2+ stable chronic illnesses and prescription management. Additionally, I provided 18 minutes of dedicated psychotherapy to the patient, distinct from E/M services, as documented above. Start time: 1416. Stop time: 1434.     Parts of this note are electronic transcriptions/translations of spoken language to printed text using the Dragon Dictation system.    Electronically signed by Tejinder Amezcua MD, 11/21/24, [id number]

## 2024-11-21 NOTE — TREATMENT PLAN
Multi-Disciplinary Problems (from Behavioral Health Treatment Plan)      Active Problems       Problem:  Patient Care Overview (Adult)  Start Date: 11/21/24      Problem Details: Treatment Planning for [place]    Applicable diagnoses/problems:    - ADHD: practice behaviors that are conducive to creating functional routines (set a medication schedule, a sleep schedule, an activity schedule, limits on spending and other potential items of impulsivity); work towards optimizing a balance of utilizing compensatory mechanisms (including medications), accepting aid from applicable social communities/family, and validation of variable attention-stimulus traits in order to optimize daily functioning.  - progress: good, regressed  - frequency of intervention: as needed  - duration of treatment: until optimized functional status is met    - Anxiety: enhance engagement with regard to ego-syntonic items of living via routine building and disruption of inhibitory executive cognitive circuits; challenge avoidance of ego-syntonic items of living via disruption to perceived barriers; reduce salience of fears and overwhelm with regard to their effects on thinking and behavior.  - progress: good, regressed  - frequency of intervention: as needed  - duration of treatment: until optimized functional status is met    - Depression: enhance motivation and interest with regard to ego-syntonic items of living via routine building and disruption of inhibitory executive cognitive circuits; challenge withdrawal from ego-syntonic items of living; cultivate solomon and satisfaction via a consistent practice of appreciation; consistently practice redirection from intrusive ego-dystonic thoughts towards actionable items to reduce influence these thoughts have in emotions and behavior.  - progress: good, nearing goal  - frequency of intervention: as needed  - duration of treatment: until optimized functional status is met        Goal Priority Start  Date Expected End Date End Date    Plan of Care Review -- 11/21/24 05/22/25 --

## 2024-11-28 ENCOUNTER — APPOINTMENT (OUTPATIENT)
Dept: CT IMAGING | Facility: HOSPITAL | Age: 39
End: 2024-11-28
Payer: COMMERCIAL

## 2024-11-28 ENCOUNTER — APPOINTMENT (OUTPATIENT)
Dept: GENERAL RADIOLOGY | Facility: HOSPITAL | Age: 39
End: 2024-11-28
Payer: COMMERCIAL

## 2024-11-28 ENCOUNTER — HOSPITAL ENCOUNTER (EMERGENCY)
Facility: HOSPITAL | Age: 39
Discharge: HOME | End: 2024-11-29
Attending: EMERGENCY MEDICINE
Payer: COMMERCIAL

## 2024-11-28 DIAGNOSIS — R09.1 PLEURISY: ICD-10-CM

## 2024-11-28 DIAGNOSIS — R07.9 CHEST PAIN, UNSPECIFIED TYPE: Primary | ICD-10-CM

## 2024-11-28 LAB
ALBUMIN SERPL-MCNC: 4.5 G/DL (ref 3.5–5.2)
ALBUMIN/GLOB SERPL: 1.5 G/DL
ALP SERPL-CCNC: 63 U/L (ref 39–117)
ALT SERPL W P-5'-P-CCNC: 11 U/L (ref 1–33)
ANION GAP SERPL CALCULATED.3IONS-SCNC: 13.6 MMOL/L (ref 5–15)
AST SERPL-CCNC: 19 U/L (ref 1–32)
BASOPHILS # BLD AUTO: 0.06 10*3/MM3 (ref 0–0.2)
BASOPHILS NFR BLD AUTO: 0.5 % (ref 0–1.5)
BILIRUB SERPL-MCNC: 0.3 MG/DL (ref 0–1.2)
BUN SERPL-MCNC: 15 MG/DL (ref 6–20)
BUN/CREAT SERPL: 16.5 (ref 7–25)
CALCIUM SPEC-SCNC: 9.4 MG/DL (ref 8.6–10.5)
CHLORIDE SERPL-SCNC: 102 MMOL/L (ref 98–107)
CO2 SERPL-SCNC: 21.4 MMOL/L (ref 22–29)
CREAT SERPL-MCNC: 0.91 MG/DL (ref 0.57–1)
D DIMER PPP FEU-MCNC: 0.71 MCGFEU/ML (ref 0–0.5)
DEPRECATED RDW RBC AUTO: 46.3 FL (ref 37–54)
EGFRCR SERPLBLD CKD-EPI 2021: 81.4 ML/MIN/1.73
EOSINOPHIL # BLD AUTO: 0.18 10*3/MM3 (ref 0–0.4)
EOSINOPHIL NFR BLD AUTO: 1.5 % (ref 0.3–6.2)
ERYTHROCYTE [DISTWIDTH] IN BLOOD BY AUTOMATED COUNT: 13.5 % (ref 12.3–15.4)
GLOBULIN UR ELPH-MCNC: 3.1 GM/DL
GLUCOSE SERPL-MCNC: 80 MG/DL (ref 65–99)
HCT VFR BLD AUTO: 38.9 % (ref 34–46.6)
HGB BLD-MCNC: 12.3 G/DL (ref 12–15.9)
HOLD SPECIMEN: NORMAL
HOLD SPECIMEN: NORMAL
IMM GRANULOCYTES # BLD AUTO: 0.03 10*3/MM3 (ref 0–0.05)
IMM GRANULOCYTES NFR BLD AUTO: 0.2 % (ref 0–0.5)
LIPASE SERPL-CCNC: 27 U/L (ref 13–60)
LYMPHOCYTES # BLD AUTO: 3.13 10*3/MM3 (ref 0.7–3.1)
LYMPHOCYTES NFR BLD AUTO: 26 % (ref 19.6–45.3)
MAGNESIUM SERPL-MCNC: 2.1 MG/DL (ref 1.6–2.6)
MCH RBC QN AUTO: 29 PG (ref 26.6–33)
MCHC RBC AUTO-ENTMCNC: 31.6 G/DL (ref 31.5–35.7)
MCV RBC AUTO: 91.7 FL (ref 79–97)
MONOCYTES # BLD AUTO: 1.02 10*3/MM3 (ref 0.1–0.9)
MONOCYTES NFR BLD AUTO: 8.5 % (ref 5–12)
NEUTROPHILS NFR BLD AUTO: 63.3 % (ref 42.7–76)
NEUTROPHILS NFR BLD AUTO: 7.63 10*3/MM3 (ref 1.7–7)
NRBC BLD AUTO-RTO: 0 /100 WBC (ref 0–0.2)
NT-PROBNP SERPL-MCNC: 38 PG/ML (ref 0–450)
PLATELET # BLD AUTO: 260 10*3/MM3 (ref 140–450)
PMV BLD AUTO: 10.7 FL (ref 6–12)
POTASSIUM SERPL-SCNC: 4.1 MMOL/L (ref 3.5–5.2)
PROT SERPL-MCNC: 7.6 G/DL (ref 6–8.5)
QT INTERVAL: 320 MS
QTC INTERVAL: 435 MS
RBC # BLD AUTO: 4.24 10*6/MM3 (ref 3.77–5.28)
SODIUM SERPL-SCNC: 137 MMOL/L (ref 136–145)
TROPONIN T SERPL HS-MCNC: <6 NG/L
WBC NRBC COR # BLD AUTO: 12.05 10*3/MM3 (ref 3.4–10.8)
WHOLE BLOOD HOLD COAG: NORMAL
WHOLE BLOOD HOLD SPECIMEN: NORMAL

## 2024-11-28 PROCEDURE — 83880 ASSAY OF NATRIURETIC PEPTIDE: CPT | Performed by: EMERGENCY MEDICINE

## 2024-11-28 PROCEDURE — 71275 CT ANGIOGRAPHY CHEST: CPT

## 2024-11-28 PROCEDURE — 83735 ASSAY OF MAGNESIUM: CPT | Performed by: EMERGENCY MEDICINE

## 2024-11-28 PROCEDURE — 93005 ELECTROCARDIOGRAM TRACING: CPT

## 2024-11-28 PROCEDURE — 93005 ELECTROCARDIOGRAM TRACING: CPT | Performed by: EMERGENCY MEDICINE

## 2024-11-28 PROCEDURE — 80053 COMPREHEN METABOLIC PANEL: CPT | Performed by: EMERGENCY MEDICINE

## 2024-11-28 PROCEDURE — 96374 THER/PROPH/DIAG INJ IV PUSH: CPT

## 2024-11-28 PROCEDURE — 71045 X-RAY EXAM CHEST 1 VIEW: CPT

## 2024-11-28 PROCEDURE — 85379 FIBRIN DEGRADATION QUANT: CPT | Performed by: EMERGENCY MEDICINE

## 2024-11-28 PROCEDURE — 99285 EMERGENCY DEPT VISIT HI MDM: CPT

## 2024-11-28 PROCEDURE — 25010000002 ACETAMINOPHEN 10 MG/ML SOLUTION: Performed by: EMERGENCY MEDICINE

## 2024-11-28 PROCEDURE — 85025 COMPLETE CBC W/AUTO DIFF WBC: CPT | Performed by: EMERGENCY MEDICINE

## 2024-11-28 PROCEDURE — 83690 ASSAY OF LIPASE: CPT | Performed by: EMERGENCY MEDICINE

## 2024-11-28 PROCEDURE — 84484 ASSAY OF TROPONIN QUANT: CPT | Performed by: EMERGENCY MEDICINE

## 2024-11-28 PROCEDURE — 36415 COLL VENOUS BLD VENIPUNCTURE: CPT

## 2024-11-28 PROCEDURE — 25510000001 IOPAMIDOL PER 1 ML: Performed by: EMERGENCY MEDICINE

## 2024-11-28 RX ORDER — CYCLOBENZAPRINE HCL 10 MG
10 TABLET ORAL 3 TIMES DAILY PRN
Qty: 9 TABLET | Refills: 0 | Status: SHIPPED | OUTPATIENT
Start: 2024-11-28

## 2024-11-28 RX ORDER — IOPAMIDOL 755 MG/ML
100 INJECTION, SOLUTION INTRAVASCULAR
Status: COMPLETED | OUTPATIENT
Start: 2024-11-28 | End: 2024-11-28

## 2024-11-28 RX ORDER — ACETAMINOPHEN 10 MG/ML
1000 INJECTION, SOLUTION INTRAVENOUS ONCE
Status: COMPLETED | OUTPATIENT
Start: 2024-11-28 | End: 2024-11-28

## 2024-11-28 RX ORDER — SODIUM CHLORIDE 0.9 % (FLUSH) 0.9 %
10 SYRINGE (ML) INJECTION AS NEEDED
Status: DISCONTINUED | OUTPATIENT
Start: 2024-11-28 | End: 2024-11-29 | Stop reason: HOSPADM

## 2024-11-28 RX ORDER — ASPIRIN 81 MG/1
324 TABLET, CHEWABLE ORAL ONCE
Status: COMPLETED | OUTPATIENT
Start: 2024-11-28 | End: 2024-11-28

## 2024-11-28 RX ADMIN — ACETAMINOPHEN 1000 MG: 10 INJECTION INTRAVENOUS at 22:45

## 2024-11-28 RX ADMIN — IOPAMIDOL 53 ML: 755 INJECTION, SOLUTION INTRAVENOUS at 23:01

## 2024-11-28 RX ADMIN — ASPIRIN 324 MG: 81 TABLET, CHEWABLE ORAL at 21:27

## 2024-11-29 VITALS
SYSTOLIC BLOOD PRESSURE: 124 MMHG | BODY MASS INDEX: 24.5 KG/M2 | WEIGHT: 143.52 LBS | TEMPERATURE: 97.7 F | OXYGEN SATURATION: 100 % | HEIGHT: 64 IN | DIASTOLIC BLOOD PRESSURE: 92 MMHG | HEART RATE: 89 BPM | RESPIRATION RATE: 16 BRPM

## 2024-11-29 NOTE — ED NOTES
Saline lock removed intact. IV site looked unremarkable. Pressure dressing applied. Discharge instructions reviewed with patient. Patient verbalized understanding. Copy of discharge instructions given to patient and patient notified that prescription x 1 sent electronically to pharmacy of patients choice. Patient discharged to home in good condition per personal vehicle, family member/friend driving. Patient ambulated out of the emergency department without any difficulties.

## 2024-11-29 NOTE — ED PROVIDER NOTES
Time: 9:17 PM EST  Date of encounter:  11/28/2024  Independent Historian/Clinical History and Information was obtained by:   Patient    History is limited by: N/A    Chief Complaint: Chest pain      History of Present Illness:  Patient is a 39 y.o. year old female who presents to the emergency department for evaluation of chest pain    Patient describes approximately 24 hours of tightness in her chest and upper back.  She is attempted to twist and turn to pop the tight areas without relief.  She states the pain becomes worse with any deep breathing or coughing.  She has some mild associated shortness of breath.  She denies any recent illness.  She denies leg pain or swelling.  She has had no treatment prior to arrival.  She denies any previous cardiac history.      Patient Care Team  Primary Care Provider: Rula Myers APRN    Past Medical History:     Allergies   Allergen Reactions    Naproxen Hives and Anaphylaxis     Past Medical History:   Diagnosis Date    Acid reflux     ADHD (attention deficit hyperactivity disorder)     Allergic rhinitis     Anxiety     Arthritis     Depression     Hyperlipidemia     Hypertension     Leg pain     Seasonal allergies     Sinus trouble     Sleep disturbance     Stroke 03/16/2021    Thumb fracture 2012    Right     Past Surgical History:   Procedure Laterality Date    ANKLE SURGERY      EYE SURGERY  1989    FRACTURE SURGERY  1/10/2014    Kneecap reconstruction    KNEE PCL RECONSTRUCTION Left 2014    open ligament     Family History   Problem Relation Age of Onset    Anxiety disorder Mother     Arthritis Mother     Diabetes Mother     Arthritis Father     Heart disease Father     Diabetes Father     COPD Father     Hyperlipidemia Father     Other Father         Idiopathic Pulmonary Fibrosis    No Known Problems Sister     Arthritis Brother     Heart disease Brother     Diabetes Brother     No Known Problems Maternal Aunt     No Known Problems Paternal Aunt     No Known  Problems Maternal Uncle     No Known Problems Paternal Uncle     No Known Problems Maternal Grandfather     No Known Problems Maternal Grandmother     No Known Problems Paternal Grandfather     No Known Problems Paternal Grandmother     No Known Problems Cousin     ADD / ADHD Neg Hx     Alcohol abuse Neg Hx     Bipolar disorder Neg Hx     Dementia Neg Hx     Depression Neg Hx     Drug abuse Neg Hx     OCD Neg Hx     Paranoid behavior Neg Hx     Schizophrenia Neg Hx     Seizures Neg Hx     Self-Injurious Behavior  Neg Hx     Suicide Attempts Neg Hx        Home Medications:  Prior to Admission medications    Medication Sig Start Date End Date Taking? Authorizing Provider   amphetamine-dextroamphetamine (ADDERALL) 20 MG tablet Take 1 tablet by mouth Every Afternoon. 11/21/24   Tejinder Amezcua MD   amphetamine-dextroamphetamine (Adderall) 30 MG tablet Take 1 tablet by mouth Every Morning. 11/21/24   Tejinder Amezcua MD   aspirin 81 MG EC tablet Take 1 tablet by mouth Daily. 8/7/24   Rula Myers APRN   DULoxetine (CYMBALTA) 30 MG capsule Take 1 capsule by mouth Daily. 11/21/24   Tejinder Amezcua MD   DULoxetine (CYMBALTA) 60 MG capsule TAKE 1 CAPSULE BY MOUTH EVERY DAY 7/11/24   Rula Myers APRN   hydrOXYzine (ATARAX) 25 MG tablet Take 1 tablet by mouth every night at bedtime. 11/21/24   Tejinder Amezcua MD   ibuprofen (ADVIL,MOTRIN) 800 MG tablet Take 1 tablet by mouth 2 (Two) Times a Day As Needed.    Provider, MD [place]   traZODone (DESYREL) 50 MG tablet TAKE 1 TO 2 TABLETS BY MOUTH AT BEDTIME AS NEEDED FOR INSOMNIA 7/16/24   Rula Myers APRN        Social History:   Social History     Tobacco Use    Smoking status: Never     Passive exposure: Past    Smokeless tobacco: Never   Vaping Use    Vaping status: Never Used   Substance Use Topics    Alcohol use: Never    Drug use: Never         Review of Systems:  Review of Systems   Constitutional:  Negative for chills and  "fever.   HENT:  Negative for congestion, ear pain and sore throat.    Eyes:  Negative for pain.   Respiratory:  Positive for chest tightness and shortness of breath. Negative for cough.    Cardiovascular:  Negative for chest pain.   Gastrointestinal:  Negative for abdominal pain, diarrhea, nausea and vomiting.   Genitourinary:  Negative for flank pain and hematuria.   Musculoskeletal:  Negative for joint swelling.   Skin:  Negative for pallor.   Neurological:  Negative for seizures and headaches.   All other systems reviewed and are negative.       Physical Exam:  /92   Pulse 89   Temp 97.7 °F (36.5 °C) (Oral)   Resp 16   Ht 162.6 cm (64\")   Wt 65.1 kg (143 lb 8.3 oz)   LMP 11/21/2024 (Approximate)   SpO2 100%   BMI 24.64 kg/m²     Physical Exam  Vitals and nursing note reviewed.   Constitutional:       General: She is not in acute distress.     Appearance: Normal appearance. She is not toxic-appearing.   HENT:      Head: Normocephalic and atraumatic.      Jaw: There is normal jaw occlusion.   Eyes:      General: Lids are normal.      Extraocular Movements: Extraocular movements intact.      Conjunctiva/sclera: Conjunctivae normal.      Pupils: Pupils are equal, round, and reactive to light.   Cardiovascular:      Rate and Rhythm: Regular rhythm. Tachycardia present.      Pulses: Normal pulses.      Heart sounds: Normal heart sounds.   Pulmonary:      Effort: Pulmonary effort is normal. No respiratory distress.      Breath sounds: Normal breath sounds. No wheezing or rhonchi.   Abdominal:      General: Abdomen is flat.      Palpations: Abdomen is soft.      Tenderness: There is no abdominal tenderness. There is no guarding or rebound.   Musculoskeletal:         General: Normal range of motion.      Cervical back: Normal range of motion and neck supple.      Right lower leg: No edema.      Left lower leg: No edema.   Skin:     General: Skin is warm and dry.   Neurological:      Mental Status: She is " alert and oriented to person, place, and time. Mental status is at baseline.   Psychiatric:         Mood and Affect: Mood normal.           Procedures:  Procedures      Medical Decision Making:      Comorbidities that affect care:    Acid reflux, anxiety, depression, hyperlipidemia, hypertension, history of stroke, ADHD    External Notes reviewed:    Previous Clinic Note: Outpatient behavioral health visit 11/21/2024      The following orders were placed and all results were independently analyzed by me:  Orders Placed This Encounter   Procedures    XR Chest 1 View    CT Angiogram Chest Pulmonary Embolism    [place] Draw    High Sensitivity Troponin T    Comprehensive Metabolic Panel    Lipase    BNP    Magnesium    CBC Auto Differential    D-dimer, Quantitative    Undress & Gown    Continuous Pulse Oximetry    CBC & Differential    Green Top (Gel)    Lavender Top    Gold Top - SST    Light Blue Top       Medications Given in the Emergency Department:  Medications   aspirin chewable tablet 324 mg (324 mg Oral Given 11/28/24 2127)   acetaminophen (OFIRMEV) injection 1,000 mg (1,000 mg Intravenous Given 11/28/24 2245)   iopamidol (ISOVUE-370) 76 % injection 100 mL (53 mL Intravenous Given 11/28/24 2301)        ED Course:    ED Course as of 11/29/24 0531   Thu Nov 28, 2024 2118 My interpretation of EKG: Sinus tachycardia 111, diffuse T wave flattening, no ST elevation [JS]      ED Course User Index  [JS] Eliezer Woody MD       Labs:    Lab Results (last 24 hours)       Procedure Component Value Units Date/Time    CBC & Differential [[id number]]  (Abnormal) Collected: 11/28/24 2123    Specimen: Blood Updated: 11/28/24 2130    Narrative:      The following orders were created for panel order CBC & Differential.  Procedure                               Abnormality         Status                     ---------                               -----------         ------                     CBC Auto Differential[[id number]]      "   Abnormal            Final result                 Please view results for these tests on the individual orders.    CBC Auto Differential [[id number]]  (Abnormal) Collected: 11/28/24 2123    Specimen: Blood Updated: 11/28/24 2130     WBC 12.05 10*3/mm3      RBC 4.24 10*6/mm3      Hemoglobin 12.3 g/dL      Hematocrit 38.9 %      MCV 91.7 fL      MCH 29.0 pg      MCHC 31.6 g/dL      RDW 13.5 %      RDW-SD 46.3 fl      MPV 10.7 fL      Platelets 260 10*3/mm3      Neutrophil % 63.3 %      Lymphocyte % 26.0 %      Monocyte % 8.5 %      Eosinophil % 1.5 %      Basophil % 0.5 %      Immature Grans % 0.2 %      Neutrophils, Absolute 7.63 10*3/mm3      Lymphocytes, Absolute 3.13 10*3/mm3      Monocytes, Absolute 1.02 10*3/mm3      Eosinophils, Absolute 0.18 10*3/mm3      Basophils, Absolute 0.06 10*3/mm3      Immature Grans, Absolute 0.03 10*3/mm3      nRBC 0.0 /100 WBC     D-dimer, Quantitative [[id number]]  (Abnormal) Collected: 11/28/24 2123    Specimen: Blood Updated: 11/28/24 2221     D-Dimer, Quantitative 0.71 MCGFEU/mL     Narrative:      According to the assay 's published package insert, a normal (<0.50 MCGFEU/mL) D-dimer result in conjunction with a non-high clinical probability assessment, excludes deep vein thrombosis (DVT) and pulmonary embolism (PE) with high sensitivity.    D-dimer values increase with age and this can make VTE exclusion of an older population difficult. To address this, the [employer], based on best available evidence and recent guidelines, recommends that clinicians use age-adjusted D-dimer thresholds in patients greater than 50 years of age with: a) a low probability of PE who do not meet all Pulmonary Embolism Rule Out Criteria, or b) in those with intermediate probability of PE.   The formula for an age-adjusted D-dimer cut-off is \"age/100\".  For example, a 60 year old patient would have an age-adjusted cut-off of 0.60 MCGFEU/mL and an 80 year old 0.80 " MCGFEU/mL.    Lipase [[id number]]  (Normal) Collected: 11/28/24 2157    Specimen: Blood Updated: 11/28/24 2232     Lipase 27 U/L      Comment: Specimen hemolyzed.  Results may be falsely decreased.       BNP [[id number]]  (Normal) Collected: 11/28/24 2157    Specimen: Blood Updated: 11/28/24 2224     proBNP 38.0 pg/mL     Narrative:      This assay is used as an aid in the diagnosis of individuals suspected of having heart failure. It can be used as an aid in the diagnosis of acute decompensated heart failure (ADHF) in patients presenting with signs and symptoms of ADHF to the emergency department (ED). In addition, NT-proBNP of <300 pg/mL indicates ADHF is not likely.    Age Range Result Interpretation  NT-proBNP Concentration (pg/mL:      <50             Positive            >450                   Gray                 300-450                    Negative             <300    50-75           Positive            >900                  Gray                300-900                  Negative            <300      >75             Positive            >1800                  Gray                300-1800                  Negative            <300    Magnesium [[id number]]  (Normal) Collected: 11/28/24 2157    Specimen: Blood Updated: 11/28/24 2232     Magnesium 2.1 mg/dL     High Sensitivity Troponin T [[id number]]  (Normal) Collected: 11/28/24 2244    Specimen: Blood Updated: 11/28/24 2311     HS Troponin T <6 ng/L     Narrative:      High Sensitive Troponin T Reference Range:  <14.0 ng/L- Negative Female for AMI  <22.0 ng/L- Negative Male for AMI  >=14 - Abnormal Female indicating possible myocardial injury.  >=22 - Abnormal Male indicating possible myocardial injury.   Clinicians would have to utilize clinical acumen, EKG, Troponin, and serial changes to determine if it is an Acute Myocardial Infarction or myocardial injury due to an underlying chronic condition.         Comprehensive Metabolic Panel [[id number]]  (Abnormal)  Collected: 11/28/24 2244    Specimen: Blood Updated: 11/28/24 2311     Glucose 80 mg/dL      BUN 15 mg/dL      Creatinine 0.91 mg/dL      Sodium 137 mmol/L      Potassium 4.1 mmol/L      Comment: Slight hemolysis detected by analyzer. Result may be falsely elevated.        Chloride 102 mmol/L      CO2 21.4 mmol/L      Calcium 9.4 mg/dL      Total Protein 7.6 g/dL      Albumin 4.5 g/dL      ALT (SGPT) 11 U/L      AST (SGOT) 19 U/L      Alkaline Phosphatase 63 U/L      Total Bilirubin 0.3 mg/dL      Globulin 3.1 gm/dL      A/G Ratio 1.5 g/dL      BUN/Creatinine Ratio 16.5     Anion Gap 13.6 mmol/L      eGFR 81.4 mL/min/1.73     Narrative:      GFR Normal >60  Chronic Kidney Disease <60  Kidney Failure <15               Imaging:    CT Angiogram Chest Pulmonary Embolism    Result Date: 11/28/2024  CT ANGIOGRAM CHEST PULMONARY EMBOLISM Date of Exam: 11/28/2024 10:51 PM EST Indication: Chest pain, tachycardia, positive D-dimer. Comparison: None available. Technique: Axial CT images were obtained of the chest after the uneventful intravenous administration of iodinated contrast utilizing pulmonary embolism protocol.  Reconstructed coronal and sagittal images were also obtained. Automated exposure control and iterative construction methods were used. Findings: No pulmonary embolism or aortic dissection is identified. Incidental note is made of a bovine branching pattern from the aortic arch. No pleural or pericardial effusion is seen. There is no adenopathy. No acute findings in the visualized upper abdomen. Other than some mild dependent atelectasis in the lower lobes, the lungs are clear. No pneumothorax.     1.No pulmonary embolism or aortic dissection is identified. 2.Mild dependent atelectasis in the lower lobes. The lungs are otherwise clear. Electronically Signed: Rangel Castellanos MD  11/28/2024 11:14 PM EST  Workstation ID: [id number]    XR Chest 1 View    Result Date: 11/28/2024  XR CHEST 1 VW Date of Exam: 11/28/2024  9:34 PM EST Indication: Chest Pain Triage Protocol Comparison: 7/10/2024 Findings: Cardiac and mediastinal contours are normal. Pulmonary vascularity is normal. The lungs are clear. No pneumothorax.     No active disease. Electronically Signed: Rangel Castellanos MD  11/28/2024 9:50 PM EST  Workstation ID: [id number]       Differential Diagnosis and Discussion:    Chest Pain:  Based on the patient's signs and symptoms, I considered aortic dissection, myocardial infaction, pulmonary embolism, cardiac tamponade, pericarditis, pneumothorax, musculoskeletal chest pain and other differential diagnosis as an etiology of the patient's chest pain.     All labs were reviewed and interpreted by me.  All X-rays impressions were independently interpreted by me.  EKG was interpreted by me.  CT scan radiology impression was interpreted by me.    [facility]                     Patient Care Considerations:    NARCOTICS: I considered prescribing opiate pain medication as an outpatient, however no narcotic pain control required in the emergency department.      Consultants/Shared Management Plan:    None    Social Determinants of Health:    Patient has presented with family members who are responsible, reliable and will ensure follow up care.      Disposition and Care Coordination:    Discharged: I considered escalation of care by admitting this patient to the hospital, however patient is low risk heart score and chest pain improved with treatment.    I have explained the patient´s condition, diagnoses and treatment plan based on the information available to me at this time. I have answered questions and addressed any concerns. The patient has a good  understanding of the patient´s diagnosis, condition, and treatment plan as can be expected at this point. The vital signs have been stable. The patient´s condition is stable and appropriate for discharge from the emergency department.      The patient will pursue further outpatient evaluation with  the primary care physician or other designated or consulting physician as outlined in the discharge instructions. They are agreeable to this plan of care and follow-up instructions have been explained in detail. The patient has received these instructions in written format and has expressed an understanding of the discharge instructions. The patient is aware that any significant change in condition or worsening of symptoms should prompt an immediate return to this or the closest emergency department or call to 911.  I have explained discharge medications and the need for follow up with the patient/caretakers. This was also printed in the discharge instructions. Patient was discharged with the following medications and follow up:      Medication List        New Prescriptions      cyclobenzaprine 10 MG tablet  Commonly known as: FLEXERIL  Take 1 tablet by mouth 3 (Three) Times a Day As Needed for Muscle Spasms.               Where to Get Your Medications        These medications were sent to [facility]/pharmacy #[id number] - [place], [place] - [address] - [phone]  - [phone] FX  [address] [place] [address]      Hours: 24-hours Phone: [phone]   cyclobenzaprine 10 MG tablet      Rula Myers APRN  [address] [place] [address]  [phone]    Schedule an appointment as soon as possible for a visit          Final diagnoses:   Chest pain, unspecified type   Pleurisy        ED Disposition       ED Disposition   Discharge    Condition   Stable    Comment   --               This medical record created using voice recognition software.             Eliezer Woody MD  11/29/24 [id number]

## 2024-12-01 DIAGNOSIS — F90.2 ATTENTION DEFICIT HYPERACTIVITY DISORDER (ADHD), COMBINED TYPE: ICD-10-CM

## 2024-12-02 RX ORDER — DEXTROAMPHETAMINE SACCHARATE, AMPHETAMINE ASPARTATE, DEXTROAMPHETAMINE SULFATE AND AMPHETAMINE SULFATE 7.5; 7.5; 7.5; 7.5 MG/1; MG/1; MG/1; MG/1
30 TABLET ORAL EVERY MORNING
Qty: 30 TABLET | Refills: 0 | Status: SHIPPED | OUTPATIENT
Start: 2024-12-21

## 2024-12-02 RX ORDER — DEXTROAMPHETAMINE SACCHARATE, AMPHETAMINE ASPARTATE, DEXTROAMPHETAMINE SULFATE AND AMPHETAMINE SULFATE 5; 5; 5; 5 MG/1; MG/1; MG/1; MG/1
20 TABLET ORAL
Qty: 60 TABLET | Refills: 0 | Status: SHIPPED | OUTPATIENT
Start: 2024-12-02

## 2024-12-02 NOTE — TELEPHONE ENCOUNTER
REFILL REQUEST:     amphetamine-dextroamphetamine (ADDERALL) 20 MG tablet (11/21/2024)     F/UP- 01/16/2025.  LOV: 11/21/2024.    LAST UDS:  Urine Drug Screen - Urine, Clean Catch (07/20/2023 07:24)     Urine Drug Screen - Urine, Clean Catch (06/03/2024 16:04)- STILL UNCOMPLETED.    ROUTING TO COVERING PROVIDER.

## 2024-12-08 LAB
QT INTERVAL: 320 MS
QTC INTERVAL: 435 MS

## 2024-12-13 NOTE — TELEPHONE ENCOUNTER
NEXT VISIT WITH PROVIDER   Appointment with Tejinder Amezcua MD (01/16/2025)     LAST SEEN BY PROVIDER   Office Visit with Tejinder Amezcua MD (11/21/2024)     LAST MED REFILL  cyclobenzaprine (FLEXERIL) 10 MG tablet (11/28/2024)     PROVIDER PLEASE ADVISE

## 2024-12-16 RX ORDER — CYCLOBENZAPRINE HCL 10 MG
10 TABLET ORAL 3 TIMES DAILY PRN
Qty: 9 TABLET | Refills: 0 | OUTPATIENT
Start: 2024-12-16

## 2025-01-15 NOTE — PROGRESS NOTES
"[facility]  Follow-up Visit    Chief Complaint: \"I went to the doctor for having trouble focusing... doing school, work... in the process of moving... I get overwhelmed and don't actually start on something... get distracted and do something else... need to sit down and study... focus on what I'm doing... hard to get started doing that.\"     History of Present Illness: Laurel Molina is a 39 y.o. female who presents today for follow-up. Last seen by this practice: 11/21 at which time amphetamine was titrated.     Current treatment regimen includes:   - amphetamine 30 mg QAM + 20 mg QPM  - duloxetine 90 mg QD  - hydroxyzine 25 mg HS  - trazodone 50 mg HS    Laurel presents on time and unaccompanied in no acute distress and engages with me appropriately. Today she reports she's doing well. ADHD symptoms are adequately managed with current interventions. Depressive symptoms are adequately managed with current interventions. Anxious symptoms are adequately managed with current interventions.   - sleep: generally adequate  - appetite: generally adequate; weight stable  - medication adverse effects: denies    Interval History and Clinical Commentary:   Ego-syntonic. Laurel presents in a fashion consistent with prior assessments with regard to MSE.    Had to visit the ED on Thanksgiving due to chest pain - EKG was reassuring, later discovered she was experiencing pleurisy - this has since improved after taking the medication she was prescribed for this (cyclobenzaprine).     She's been more engaged with her creative process and has been making art - shows me some pictures today and her work is skilled.    She's continuing to progress in school - she had an extension for her studies until March. Her program has been accommodating. She plans to be finished by summer.    Axis I: ADHD, MDD, VIOLETTE  Axis II: defer  Axis III: defer  Axis IV: medical stress  Axis V: 85    Differential " considerations: N/A    Adherence:  Treatment adherence is fair; issues in this regard have included: missed appointments. Patient is advised not to misuse prescribed medications or to use them with any exogenous substances that aren't disclosed to this provider as they may interact with the regimen to the patient's detriment. The importance of adherence to the recommended treatment and interval follow-up appointments has been emphasized.     Education:  I have counseled Laurel with regard to diagnoses and the recommended treatment regimen as documented below. I have advised that because medications can elicit idiosyncratic reactions in different individuals that SE may present in ways that haven't been discussed. I have reiterated the importance of discussing with me any clinical changes that could represent potential adverse effects regarding the medication regimen.    Patient acknowledges the diagnoses per my rendered interpretation. Patient is agreeable to call 911 or go to the nearest ER should she become concerned for her own safety and/or the safety of those around her. Patient demonstrates understanding of potential risks/benefits/side effects associated with the recommended treatment regimen and is amenable to proceed in the fashion outlined below. Patient has been encouraged to cultivate constructive patterns of living including limiting daily caffeine intake, hydrating appropriately, regularly eating nutritious foods, engaging sleep hygiene practices, engaging in appropriate exposure to sunlight, engaging with hobbies in balance with life necessities, and exercising appropriate to their capacity to do so.    Risk:  There is no significant change to risk profile discernible during today's evaluation - do note that this is subject to change with the Religious of new stressors, treatment non-adherence, use of substances, and/or new medical ails. There is no appreciable evidence of intent for harm to self or  others. There are no appreciable indices of tierney/psychosis.    Contraception and mitigation of potential teratogenicity: she is not using contraception. I have advised there are risks taking any medication during pregnancy and, unfortunately, these risks are poorly elaborated due to ethical concerns with studying medications in pregnant women. I have advised that in the case of pregnancy risk may be beyond what I'm able to advise and the general approach is that medication use should only be considered if potential benefits outweigh the possibility of risks by the patient's measure.    Psychotherapy:  - Time: N/A  - interventions employed: the therapeutic alliance was strengthened to encourage the patient to express their thoughts and feelings freely. Esteem building was enhanced through praise, reassurance, normalizing/challenging, and encouragement as appropriate. Coping skills were enhanced to build distress tolerance skills and emotional regulation. Allowed patient to freely discuss issues without interruption or judgement with unconditional positive regard, active listening skills, and empathy. Provided a safe, confidential environment to facilitate the development of a positive therapeutic relationship and encourage open, honest communication. Assisted patient in processing session content; acknowledged and normalized/addressed, as appropriate, patient’s thoughts, feelings, and concerns by utilizing a person-centered approach in efforts to build appropriate rapport and a positive therapeutic relationship with open and honest communication.   - Diagnoses: see assessment and plan below  - Symptoms: see subjective above  - Goals              - patient: cultivate concentration and focus to improve functional capacity, sustain mood and manageable anxiety levels, nourish creativity              - provider: challenge patterns of living conducive to pathology, strengthen defenses, promote problems solving, restore  adaptive functioning and provide symptom relief.  - Treatment plan: continue supportive psychotherapy in subsequent appointments to provide symptom relief; see assessment and plan below for additional details:              - iteration: 1              - progress: at goal              - (X)illumination, (X)contextualization, (X)detection, (X)development, (X)elaboration, (working)refinement  - functional status: good  - mental status exam: as below  - prognosis: good    Psychiatric History:  Diagnoses: ADD, anxiety, depression  Outpatient history: denies  Inpatient history: denies  Medication trials: sertraline (worked okay), bupropion (ineffective 150 mg), buspirone (didn't seem to have an impact; was taking once daily)  Other treatment modalities: has not done therapy in the past  Presenting regimen: atomoxetine 40 mg QD, duloxetine 60 mg QD, hydroxyzine 25 mg HS, trazodone 50 mg HS  Self harm: years ago cut  Suicide attempts: denies     Social History:  Residence: lives in a house with her mother; was living with boyfriend and they had an argument eliciting the change in living for the time being; does not have children  Vocation:  ([employer])  Education: working on associate's degree (, 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)  Pertinent developmental history: issues with math growing up; denies abuse hx  Pertinent legal history: denies  Hobbies/interests: reading (historical fiction, gothic horror), art (drawing and painting), music (clarinet and piano)  Scientology: denies  Exercise: yes, walks three times weekly  Dietary habits: defer  Sleep hygiene: defer  Social habits: no pertinent issues  Sunlight: no concern for under-exposure  Caffeine intake: coffee in the AM with an energy drink, occasional soda, occasional tea  Hydration habits: not enough water   history: denies    Social History     Socioeconomic History    Marital status: Single   Tobacco Use    Smoking status: Never      Passive exposure: Past    Smokeless tobacco: Never   Vaping Use    Vaping status: Never Used   Substance and Sexual Activity    Alcohol use: Never    Drug use: Never    Sexual activity: Yes     Partners: Male     Birth control/protection: None     Tobacco use counseling/intervention: N/A, patient does not use tobacco; patient has been counseled with regard to risks of tobacco use.    PHQ-9 Depression Screening  PHQ-9 Total Score:  (Patient-Rptd) 0    Little interest or pleasure in doing things? (Patient-Rptd) Not at all   Feeling down, depressed, or hopeless? (Patient-Rptd) Not at all   PHQ-2 Total Score (Patient-Rptd) 0   Trouble falling or staying asleep, or sleeping too much? (Patient-Rptd) Not at all   Feeling tired or having little energy? (Patient-Rptd) Not at all   Poor appetite or overeating? (Patient-Rptd) Not at all   Feeling bad about yourself - or that you are a failure or have let yourself or your family down? (Patient-Rptd) Not at all   Trouble concentrating on things, such as reading the newspaper or watching television? (Patient-Rptd) Not at all   Moving or speaking so slowly that other people could have noticed? Or the opposite - being so fidgety or restless that you have been moving around a lot more than usual? (Patient-Rptd) Not at all   Thoughts that you would be better off dead, or of hurting yourself in some way? (Patient-Rptd) Not at all   PHQ-9 Total Score (Patient-Rptd) 0   If you checked off any problems, how difficult have these problems made it for you to do your work, take care of things at home, or get along with other people? (Patient-Rptd) Not difficult at all     Change in PHQ-9 since last measure: -16 (16)    VIOLETTE-7  Feeling nervous, anxious or on edge: (Patient-Rptd) Not at all  Not being able to stop or control worrying: (Patient-Rptd) Not at all  Worrying too much about different things: (Patient-Rptd) Not at all  Trouble Relaxing: (Patient-Rptd) Not at all  Being so restless  that it is hard to sit still: (Patient-Rptd) Not at all  Feeling afraid as if something awful might happen: (Patient-Rptd) Not at all  Becoming easily annoyed or irritable: (Patient-Rptd) Not at all  VIOLETTE 7 Total Score: (Patient-Rptd) 0  If you checked any problems, how difficult have these problems made it for you to do your work, take care of things at home, or get along with other people: (Patient-Rptd) Not difficult at all    Change in VIOLETTE-7 since last measure: -19 (19)    Problem List:  Patient Active Problem List   Diagnosis    Stroke    VIOLETTE (generalized anxiety disorder)    History of stroke    Cat bite    Attention deficit hyperactivity disorder (ADHD), combined type    Major depressive disorder, recurrent episode, in partial remission     Allergy:   Allergies   Allergen Reactions    Naproxen Hives and Anaphylaxis      Discontinued Medications:  Medications Discontinued During This Encounter   Medication Reason    amphetamine-dextroamphetamine (ADDERALL) 20 MG tablet Reorder    amphetamine-dextroamphetamine (Adderall) 30 MG tablet Reorder       Current Medications:   Current Outpatient Medications   Medication Sig Dispense Refill    [START ON 1/17/2025] amphetamine-dextroamphetamine (ADDERALL) 20 MG tablet Take 1 tablet by mouth Every Afternoon. 30 tablet 0    [START ON 2/14/2025] amphetamine-dextroamphetamine (ADDERALL) 20 MG tablet Take 1 tablet by mouth Every Afternoon. 30 tablet 0    [START ON 1/17/2025] amphetamine-dextroamphetamine (Adderall) 30 MG tablet Take 1 tablet by mouth Every Morning. 30 tablet 0    [START ON 2/14/2025] amphetamine-dextroamphetamine (Adderall) 30 MG tablet Take 1 tablet by mouth Every Morning. 30 tablet 0    aspirin 81 MG EC tablet Take 1 tablet by mouth Daily. 90 tablet 1    cyclobenzaprine (FLEXERIL) 10 MG tablet Take 1 tablet by mouth 3 (Three) Times a Day As Needed for Muscle Spasms. 9 tablet 0    DULoxetine (CYMBALTA) 30 MG capsule Take 1 capsule by mouth Daily. 90 capsule  0    DULoxetine (CYMBALTA) 60 MG capsule TAKE 1 CAPSULE BY MOUTH EVERY DAY 90 capsule 3    hydrOXYzine (ATARAX) 25 MG tablet Take 1 tablet by mouth every night at bedtime. 90 tablet 0    ibuprofen (ADVIL,MOTRIN) 800 MG tablet Take 1 tablet by mouth 2 (Two) Times a Day As Needed.      traZODone (DESYREL) 50 MG tablet TAKE 1 TO 2 TABLETS BY MOUTH AT BEDTIME AS NEEDED FOR INSOMNIA 90 tablet 3     No current facility-administered medications for this visit.     Past Medical History:  Past Medical History:   Diagnosis Date    Acid reflux     ADHD (attention deficit hyperactivity disorder)     Allergic rhinitis     Anxiety     Arthritis     Depression     Hyperlipidemia     Hypertension     Leg pain     Seasonal allergies     Sinus trouble     Sleep disturbance     Stroke 03/16/2021    Thumb fracture 2012    Right     Past Surgical History:  Past Surgical History:   Procedure Laterality Date    ANKLE SURGERY      EYE SURGERY  1989    FRACTURE SURGERY  1/10/2014    Kneecap reconstruction    KNEE PCL RECONSTRUCTION Left 2014    open ligament     Mental Status Exam:   Appearance: well-groomed, sits upright, age-appropriate, normal habitus  Behavior: calm, cooperative, appropriate in demeanor, appropriate eye-contact  Mood/affect: euthymic / mood-congruent and appropriate in both range and amplitude  Speech: within expected variance; appropriate rate, appropriate rhythm, appropriate tone; non-pressured  Thought Process: linear, goal-directed; no FOI or ANASTACIA; abstraction intact  Thought Content: coherent, devoid of overt delusions/perceptual disturbances  SI/HI: denies both SI and HI; exhibits future-orientation, self-advocates appropriately, no regular self-harm, no appreciable intent  Memory: no overt deficits  Orientation: oriented to person/place/time/situation  Concentration: appropriate during interview; +subjective deficits  Intellectual capacity: presumptively average  Insight: fair by given history/exam  Judgment:  "appropriate by given history/exam  Psychomotor: no appreciable latency/retardation/agitation/tremor  Gait: WNL    Review of Systems:  Review of Systems   Constitutional:  Negative for activity change, appetite change and unexpected weight change.   Cardiovascular:  Negative for chest pain and palpitations.   Gastrointestinal:  Negative for abdominal pain and nausea.   Psychiatric/Behavioral:  Negative for agitation and sleep disturbance.      Vital Signs:   /74   Pulse 105   Ht 162.6 cm (64\")   Wt 65.3 kg (144 lb)   BMI 24.72 kg/m²      Lab Results:   Admission on 11/28/2024, Discharged on 11/29/2024   Component Date Value Ref Range Status    QT Interval 11/28/2024 320  ms Final    QTC Interval 11/28/2024 435  ms Final    HS Troponin T 11/28/2024 <6  <14 ng/L Final    Glucose 11/28/2024 80  65 - 99 mg/dL Final    BUN 11/28/2024 15  6 - 20 mg/dL Final    Creatinine 11/28/2024 0.91  0.57 - 1.00 mg/dL Final    Sodium 11/28/2024 137  136 - 145 mmol/L Final    Potassium 11/28/2024 4.1  3.5 - 5.2 mmol/L Final    Slight hemolysis detected by analyzer. Result may be falsely elevated.    Chloride 11/28/2024 102  98 - 107 mmol/L Final    CO2 11/28/2024 21.4 (L)  22.0 - 29.0 mmol/L Final    Calcium 11/28/2024 9.4  8.6 - 10.5 mg/dL Final    Total Protein 11/28/2024 7.6  6.0 - 8.5 g/dL Final    Albumin 11/28/2024 4.5  3.5 - 5.2 g/dL Final    ALT (SGPT) 11/28/2024 11  1 - 33 U/L Final    AST (SGOT) 11/28/2024 19  1 - 32 U/L Final    Alkaline Phosphatase 11/28/2024 63  39 - 117 U/L Final    Total Bilirubin 11/28/2024 0.3  0.0 - 1.2 mg/dL Final    Globulin 11/28/2024 3.1  gm/dL Final    A/G Ratio 11/28/2024 1.5  g/dL Final    BUN/Creatinine Ratio 11/28/2024 16.5  7.0 - 25.0 Final    Anion Gap 11/28/2024 13.6  5.0 - 15.0 mmol/L Final    eGFR 11/28/2024 81.4  >60.0 mL/min/1.73 Final    Lipase 11/28/2024 27  13 - 60 U/L Final    Specimen hemolyzed.  Results may be falsely decreased.    proBNP 11/28/2024 38.0  0.0 - 450.0 " pg/mL Final    Magnesium 11/28/2024 2.1  1.6 - 2.6 mg/dL Final    Extra Tube 11/28/2024 Hold for add-ons.   Final    Auto resulted.    Extra Tube 11/28/2024 hold for add-on   Final    Auto resulted    Extra Tube 11/28/2024 Hold for add-ons.   Final    Auto resulted.    Extra Tube 11/28/2024 Hold for add-ons.   Final    Auto resulted    WBC 11/28/2024 12.05 (H)  3.40 - 10.80 10*3/mm3 Final    RBC 11/28/2024 4.24  3.77 - 5.28 10*6/mm3 Final    Hemoglobin 11/28/2024 12.3  12.0 - 15.9 g/dL Final    Hematocrit 11/28/2024 38.9  34.0 - 46.6 % Final    MCV 11/28/2024 91.7  79.0 - 97.0 fL Final    MCH 11/28/2024 29.0  26.6 - 33.0 pg Final    MCHC 11/28/2024 31.6  31.5 - 35.7 g/dL Final    RDW 11/28/2024 13.5  12.3 - 15.4 % Final    RDW-SD 11/28/2024 46.3  37.0 - 54.0 fl Final    MPV 11/28/2024 10.7  6.0 - 12.0 fL Final    Platelets 11/28/2024 260  140 - 450 10*3/mm3 Final    Neutrophil % 11/28/2024 63.3  42.7 - 76.0 % Final    Lymphocyte % 11/28/2024 26.0  19.6 - 45.3 % Final    Monocyte % 11/28/2024 8.5  5.0 - 12.0 % Final    Eosinophil % 11/28/2024 1.5  0.3 - 6.2 % Final    Basophil % 11/28/2024 0.5  0.0 - 1.5 % Final    Immature Grans % 11/28/2024 0.2  0.0 - 0.5 % Final    Neutrophils, Absolute 11/28/2024 7.63 (H)  1.70 - 7.00 10*3/mm3 Final    Lymphocytes, Absolute 11/28/2024 3.13 (H)  0.70 - 3.10 10*3/mm3 Final    Monocytes, Absolute 11/28/2024 1.02 (H)  0.10 - 0.90 10*3/mm3 Final    Eosinophils, Absolute 11/28/2024 0.18  0.00 - 0.40 10*3/mm3 Final    Basophils, Absolute 11/28/2024 0.06  0.00 - 0.20 10*3/mm3 Final    Immature Grans, Absolute 11/28/2024 0.03  0.00 - 0.05 10*3/mm3 Final    nRBC 11/28/2024 0.0  0.0 - 0.2 /100 WBC Final    D-Dimer, Quantitative 11/28/2024 0.71 (H)  0.00 - 0.50 MCGFEU/mL Final   Lab on 10/29/2024   Component Date Value Ref Range Status    THC, Screen, Urine 10/29/2024 Negative  Negative Final    Phencyclidine (PCP), Urine 10/29/2024 Negative  Negative Final    Cocaine Screen, Urine  10/29/2024 Negative  Negative Final    Methamphetamine, Ur 10/29/2024 Negative  Negative Final    Opiate Screen 10/29/2024 Negative  Negative Final    Amphetamine Screen, Urine 10/29/2024 Negative  Negative Final    Benzodiazepine Screen, Urine 10/29/2024 Negative  Negative Final    Tricyclic Antidepressants Screen 10/29/2024 Negative  Negative Final    Methadone Screen, Urine 10/29/2024 Negative  Negative Final    Barbiturates Screen, Urine 10/29/2024 Negative  Negative Final    Oxycodone Screen, Urine 10/29/2024 Negative  Negative Final    Buprenorphine, Screen, Urine 10/29/2024 Negative  Negative Final    Fentanyl, Urine 10/29/2024 Negative  Negative Final   Office Visit on 08/07/2024   Component Date Value Ref Range Status    TSH 08/07/2024 0.734  0.270 - 4.200 uIU/mL Final    WBC 08/07/2024 7.13  3.40 - 10.80 10*3/mm3 Final    RBC 08/07/2024 4.20  3.77 - 5.28 10*6/mm3 Final    Hemoglobin 08/07/2024 12.3  12.0 - 15.9 g/dL Final    Hematocrit 08/07/2024 36.9  34.0 - 46.6 % Final    MCV 08/07/2024 87.9  79.0 - 97.0 fL Final    MCH 08/07/2024 29.3  26.6 - 33.0 pg Final    MCHC 08/07/2024 33.3  31.5 - 35.7 g/dL Final    RDW 08/07/2024 12.4  12.3 - 15.4 % Final    RDW-SD 08/07/2024 38.9  37.0 - 54.0 fl Final    MPV 08/07/2024 10.9  6.0 - 12.0 fL Final    Platelets 08/07/2024 316  140 - 450 10*3/mm3 Final    Glucose 08/07/2024 91  65 - 99 mg/dL Final    BUN 08/07/2024 9  6 - 20 mg/dL Final    Creatinine 08/07/2024 1.04 (H)  0.57 - 1.00 mg/dL Final    Sodium 08/07/2024 138  136 - 145 mmol/L Final    Potassium 08/07/2024 4.3  3.5 - 5.2 mmol/L Final    Chloride 08/07/2024 105  98 - 107 mmol/L Final    CO2 08/07/2024 24.0  22.0 - 29.0 mmol/L Final    Calcium 08/07/2024 9.6  8.6 - 10.5 mg/dL Final    Total Protein 08/07/2024 7.2  6.0 - 8.5 g/dL Final    Albumin 08/07/2024 4.4  3.5 - 5.2 g/dL Final    ALT (SGPT) 08/07/2024 12  1 - 33 U/L Final    AST (SGOT) 08/07/2024 17  1 - 32 U/L Final    Alkaline Phosphatase 08/07/2024  61  39 - 117 U/L Final    Total Bilirubin 08/07/2024 0.3  0.0 - 1.2 mg/dL Final    Globulin 08/07/2024 2.8  gm/dL Final    A/G Ratio 08/07/2024 1.6  g/dL Final    BUN/Creatinine Ratio 08/07/2024 8.7  7.0 - 25.0 Final    Anion Gap 08/07/2024 9.0  5.0 - 15.0 mmol/L Final    eGFR 08/07/2024 69.4  >60.0 mL/min/1.73 Final    Total Cholesterol 08/07/2024 183  0 - 200 mg/dL Final    Triglycerides 08/07/2024 37  0 - 150 mg/dL Final    HDL Cholesterol 08/07/2024 87 (H)  40 - 60 mg/dL Final    LDL Cholesterol  08/07/2024 88  0 - 100 mg/dL Final    VLDL Cholesterol 08/07/2024 8  5 - 40 mg/dL Final    LDL/HDL Ratio 08/07/2024 1.02   Final     EKG Results:  No orders to display     Imaging Results:  CT Angiogram Chest Pulmonary Embolism  Result Date: 11/28/2024  1.No pulmonary embolism or aortic dissection is identified. 2.Mild dependent atelectasis in the lower lobes. The lungs are otherwise clear. Electronically Signed: Rangel Castellanos MD     XR Chest 1 View  Result Date: 11/28/2024  No active disease. Electronically Signed: Rangel Castellanos MD     ASSESSMENT AND PLAN:    ICD-10-CM ICD-9-CM   1. Attention deficit hyperactivity disorder (ADHD), combined type  F90.2 314.01   2. Major depression, recurrent, full remission  F33.42 296.36   3. VIOLETTE (generalized anxiety disorder)  F41.1 300.02     39 y.o. female who presents today for follow-up. We have discussed the interval history and the treatment plan below:      Medication regimen: no change - continue amphetamine IR, duloxetine, hydroxyzine, trazodone   Monitoring: reviewed labs/imaging as populated above  Primary psychotherapy: referred  Follow-up: 2 months  Communications: N/A  Treatment plan: 11/21/2024    TREATMENT PLAN/GOALS: challenge patterns of living conducive to symptom burden, implement recommended regimen as above with augmentative, intermittent supportive psychotherapy to reduce symptom burden. Patient acknowledged and verbally consented to continue treatment.       Billing: This encounter is of moderate complexity based on number/complexity of problems addressed today and risk of complications/morbidity: 2+ stable chronic illnesses and and prescription management.     Electronically signed by Tejinder Amezcua MD, 01/16/25, [id number]

## 2025-01-16 ENCOUNTER — OFFICE VISIT (OUTPATIENT)
Dept: PSYCHIATRY | Facility: CLINIC | Age: 40
End: 2025-01-16
Payer: COMMERCIAL

## 2025-01-16 VITALS
HEART RATE: 105 BPM | BODY MASS INDEX: 24.59 KG/M2 | WEIGHT: 144 LBS | SYSTOLIC BLOOD PRESSURE: 121 MMHG | HEIGHT: 64 IN | DIASTOLIC BLOOD PRESSURE: 74 MMHG

## 2025-01-16 DIAGNOSIS — F90.2 ATTENTION DEFICIT HYPERACTIVITY DISORDER (ADHD), COMBINED TYPE: Primary | ICD-10-CM

## 2025-01-16 DIAGNOSIS — F41.1 GAD (GENERALIZED ANXIETY DISORDER): ICD-10-CM

## 2025-01-16 DIAGNOSIS — F33.42 MAJOR DEPRESSION, RECURRENT, FULL REMISSION: ICD-10-CM

## 2025-01-16 RX ORDER — DEXTROAMPHETAMINE SACCHARATE, AMPHETAMINE ASPARTATE, DEXTROAMPHETAMINE SULFATE AND AMPHETAMINE SULFATE 5; 5; 5; 5 MG/1; MG/1; MG/1; MG/1
20 TABLET ORAL
Qty: 30 TABLET | Refills: 0 | Status: SHIPPED | OUTPATIENT
Start: 2025-01-17

## 2025-01-16 RX ORDER — DEXTROAMPHETAMINE SACCHARATE, AMPHETAMINE ASPARTATE, DEXTROAMPHETAMINE SULFATE AND AMPHETAMINE SULFATE 7.5; 7.5; 7.5; 7.5 MG/1; MG/1; MG/1; MG/1
30 TABLET ORAL EVERY MORNING
Qty: 30 TABLET | Refills: 0 | Status: SHIPPED | OUTPATIENT
Start: 2025-01-17

## 2025-01-16 RX ORDER — DEXTROAMPHETAMINE SACCHARATE, AMPHETAMINE ASPARTATE, DEXTROAMPHETAMINE SULFATE AND AMPHETAMINE SULFATE 7.5; 7.5; 7.5; 7.5 MG/1; MG/1; MG/1; MG/1
30 TABLET ORAL EVERY MORNING
Qty: 30 TABLET | Refills: 0 | Status: SHIPPED | OUTPATIENT
Start: 2025-02-14

## 2025-01-16 RX ORDER — DEXTROAMPHETAMINE SACCHARATE, AMPHETAMINE ASPARTATE, DEXTROAMPHETAMINE SULFATE AND AMPHETAMINE SULFATE 5; 5; 5; 5 MG/1; MG/1; MG/1; MG/1
20 TABLET ORAL
Qty: 30 TABLET | Refills: 0 | Status: SHIPPED | OUTPATIENT
Start: 2025-02-14

## 2025-01-28 ENCOUNTER — TELEPHONE (OUTPATIENT)
Dept: PSYCHIATRY | Facility: CLINIC | Age: 40
End: 2025-01-28
Payer: COMMERCIAL

## 2025-01-29 DIAGNOSIS — F51.01 PRIMARY INSOMNIA: ICD-10-CM

## 2025-01-29 RX ORDER — TRAZODONE HYDROCHLORIDE 50 MG/1
TABLET, FILM COATED ORAL
Qty: 60 TABLET | Refills: 5 | Status: SHIPPED | OUTPATIENT
Start: 2025-01-29

## 2025-01-29 NOTE — TELEPHONE ENCOUNTER
I'm sorry to hear this. I'd like to see her sooner if she can manage. OK to schedule for more urgent appointment - please use 10:30, 3:50, or 4:50 appointment times preferentially (please avoid 1:30 time if feasible).

## 2025-01-29 NOTE — TELEPHONE ENCOUNTER
PT(PATIENT) VERIFIED     PT(PATIENT) STATES SHE'S HAD INCREASED STRESS LATELY THAT IS CAUSING ISSUES     PT(PATIENT) STATES HER ANXIETY AND DEPRESSION ARE WORSE BECAUSE OF THIS     NEXT APPT WITH PROVIDER   Appointment with Tejinder Amezcua MD (2025)     PLEASE ADVISE

## 2025-02-04 ENCOUNTER — TELEMEDICINE (OUTPATIENT)
Dept: PSYCHIATRY | Facility: CLINIC | Age: 40
End: 2025-02-04
Payer: COMMERCIAL

## 2025-02-04 DIAGNOSIS — F41.1 GAD (GENERALIZED ANXIETY DISORDER): ICD-10-CM

## 2025-02-04 DIAGNOSIS — F33.2 SEVERE EPISODE OF RECURRENT MAJOR DEPRESSIVE DISORDER, WITHOUT PSYCHOTIC FEATURES: Primary | ICD-10-CM

## 2025-02-04 DIAGNOSIS — F90.2 ATTENTION DEFICIT HYPERACTIVITY DISORDER (ADHD), COMBINED TYPE: ICD-10-CM

## 2025-02-04 RX ORDER — ARIPIPRAZOLE 2 MG/1
2 TABLET ORAL DAILY
Qty: 30 TABLET | Refills: 1 | Status: SHIPPED | OUTPATIENT
Start: 2025-02-04

## 2025-02-04 RX ORDER — DULOXETIN HYDROCHLORIDE 60 MG/1
120 CAPSULE, DELAYED RELEASE ORAL DAILY
Qty: 60 CAPSULE | Refills: 1 | Status: SHIPPED | OUTPATIENT
Start: 2025-02-04

## 2025-02-04 NOTE — PROGRESS NOTES
"[facility]  Follow-up Visit    Chief Complaint: \"I went to the doctor for having trouble focusing... doing school, work... in the process of moving... I get overwhelmed and don't actually start on something... get distracted and do something else... need to sit down and study... focus on what I'm doing... hard to get started doing that.\"     History of Present Illness: Laurel Molina is a 39 y.o. female who presents today for follow-up. Last seen by this practice: 01/16 at which time no changes were made to her regimen. Services today rendered via telehealth ([place]) for which the patient provided informed consent. Patient is aware she can refuse to be seen remotely at any time. Patient was located in a secure, remote environment (a private area at work) as was the provider (in-office). I confirmed the patient's identity prior to evaluation and reiterated my credentials; there were no technical issues during the session today.    Current treatment regimen includes:   - amphetamine 30 mg QAM + 20 mg QPM  - duloxetine 90 mg QD  - hydroxyzine 25 mg HS  - trazodone 50 mg HS    We are meeting urgently today. Laurel presents on time and unaccompanied in no acute distress and engages with me appropriately. Today she reports she's feeling down and unmotivated. ADHD symptoms are adequately managed with current interventions. Depressive symptoms are inadequately managed with current interventions. Anxious symptoms are partially managed with current interventions.   - sleep: fair, intermittent disruptions  - appetite: at baseline, no changes  - medication adverse effects: denies    Interval History and Clinical Commentary:   Ego-syntonic. Laurel presents in a fashion consistent with prior assessments with regard to MSE.    Her boyfriend told her just after her last appointment that he wasn't interested in having more children and this has been disappointing because their plan was to " work towards this as a goal in the relationship. She's worried about being stuck in a relationship that doesn't accommodate her life wishes as she has been in the past. As of now she plans to stay with him, worries about prospects given her age.    She's continuing to progress in school - she had an extension for her studies until March. Her program has been accommodating. She plans to be finished by summer. She has discovered that her employer doesn't plan to have her utilize the skills she's been learning - she feels she did additional training for nothing.    She had a moment while driving during which she thought about pulling onto railroad tracks and letting a train strike her car, but decided against this because there was another car behind her. She's continued to have passive thoughts of wanting to be dead, but has no plan or intent to end her life. She remarks she suspects these thoughts and feelings with pass and she's doing her best to redirect to pertinent items of daily living in order to avoid getting stuck ruminating. She feels she can keep herself safe and contracts for safety appropriately.    Axis I: ADHD, MDD, VIOLETTE  Axis II: defer  Axis III: defer  Axis IV: medical stress  Axis V: 55    Differential considerations: N/A    Adherence:  Treatment adherence is fair; issues in this regard have included: missed appointments. Patient is advised not to misuse prescribed medications or to use them with any exogenous substances that aren't disclosed to this provider as they may interact with the regimen to the patient's detriment. The importance of adherence to the recommended treatment and interval follow-up appointments has been emphasized.     Education:  I have counseled Laurel with regard to diagnoses and the recommended treatment regimen as documented below: I will be prescribing aripiprazole for MDD. Patient has been advised this agent has propensity to contribute to EPS (particularly dystonia,  tremor, akathisia, and TD), changes in arousal, and - in rare cases - has contributed to development of pathological gambling habits. I have advised that because medications can elicit idiosyncratic reactions in different individuals that SE may present in ways that haven't been discussed. I have reiterated the importance of discussing with me any clinical changes that could represent potential adverse effects regarding the medication regimen.    Patient acknowledges the diagnoses per my rendered interpretation. Patient is agreeable to call 911 or go to the nearest ER should she become concerned for her own safety and/or the safety of those around her. Patient demonstrates understanding of potential risks/benefits/side effects associated with the recommended treatment regimen and is amenable to proceed in the fashion outlined below. Patient has been encouraged to cultivate constructive patterns of living including limiting daily caffeine intake, hydrating appropriately, regularly eating nutritious foods, engaging sleep hygiene practices, engaging in appropriate exposure to sunlight, engaging with hobbies in balance with life necessities, and exercising appropriate to their capacity to do so.    Risk:  There is no significant change to risk profile discernible during today's evaluation - do note that this is subject to change with the Protestant of new stressors, treatment non-adherence, use of substances, and/or new medical ails. There is no appreciable evidence of intent for harm to self or others. There are no appreciable indices of tierney/psychosis.    Contraception and mitigation of potential teratogenicity: she is not using contraception. I have advised there are risks taking any medication during pregnancy and, unfortunately, these risks are poorly elaborated due to ethical concerns with studying medications in pregnant women. I have advised that in the case of pregnancy risk may be beyond what I'm able to advise  and the general approach is that medication use should only be considered if potential benefits outweigh the possibility of risks by the patient's measure.    Psychotherapy:  - Time: 16 minutes  - interventions employed: the therapeutic alliance was strengthened to encourage the patient to express their thoughts and feelings freely. Esteem building was enhanced through praise, reassurance, normalizing/challenging, and encouragement as appropriate. Coping skills were enhanced to build distress tolerance skills and emotional regulation. Allowed patient to freely discuss issues without interruption or judgement with unconditional positive regard, active listening skills, and empathy. Provided a safe, confidential environment to facilitate the development of a positive therapeutic relationship and encourage open, honest communication. Assisted patient in processing session content; acknowledged and normalized/addressed, as appropriate, patient’s thoughts, feelings, and concerns by utilizing a person-centered approach in efforts to build appropriate rapport and a positive therapeutic relationship with open and honest communication.   - Diagnoses: see assessment and plan below  - Symptoms: see subjective above  - Goals              - patient: cultivate concentration and focus to improve functional capacity, sustain mood and manageable anxiety levels, nourish creativity              - provider: challenge patterns of living conducive to pathology, strengthen defenses, promote problems solving, restore adaptive functioning and provide symptom relief.  - Treatment plan: continue supportive psychotherapy in subsequent appointments to provide symptom relief; see assessment and plan below for additional details:              - iteration: 1              - progress: regressed              - (X)illumination, (X)contextualization, (working)detection, (working)development, (-)elaboration, (working)refinement  - functional status:  fair  - mental status exam: as below  - prognosis: good    Psychiatric History:  Diagnoses: ADD, anxiety, depression  Outpatient history: denies  Inpatient history: denies  Medication trials: sertraline (worked okay), bupropion (ineffective 150 mg), buspirone (didn't seem to have an impact; was taking once daily)  Other treatment modalities: has not done therapy in the past  Presenting regimen: atomoxetine 40 mg QD, duloxetine 60 mg QD, hydroxyzine 25 mg HS, trazodone 50 mg HS  Self harm: years ago cut  Suicide attempts: denies     Social History:  Residence: lives in a house with her mother; was living with boyfriend and they had an argument eliciting the change in living for the time being; does not have children  Vocation:  ([employer])  Education: working on associate's degree (, 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)  Pertinent developmental history: issues with math growing up; denies abuse hx  Pertinent legal history: denies  Hobbies/interests: reading (historical fiction, gothic horror), art (drawing and painting), music (clarinet and piano)  Jainism: denies  Exercise: yes, walks three times weekly  Dietary habits: defer  Sleep hygiene: defer  Social habits: no pertinent issues  Sunlight: no concern for under-exposure  Caffeine intake: coffee in the AM with an energy drink, occasional soda, occasional tea  Hydration habits: not enough water   history: denies    Social History     Socioeconomic History    Marital status: Single   Tobacco Use    Smoking status: Never     Passive exposure: Past    Smokeless tobacco: Never   Vaping Use    Vaping status: Never Used   Substance and Sexual Activity    Alcohol use: Never    Drug use: Never    Sexual activity: Yes     Partners: Male     Birth control/protection: None     Tobacco use counseling/intervention: N/A, patient does not use tobacco; patient has been counseled with regard to risks of tobacco use.    PHQ-9 Depression  Screening  PHQ-9 Total Score:       Little interest or pleasure in doing things?     Feeling down, depressed, or hopeless?     PHQ-2 Total Score     Trouble falling or staying asleep, or sleeping too much?     Feeling tired or having little energy?     Poor appetite or overeating?     Feeling bad about yourself - or that you are a failure or have let yourself or your family down?     Trouble concentrating on things, such as reading the newspaper or watching television?     Moving or speaking so slowly that other people could have noticed? Or the opposite - being so fidgety or restless that you have been moving around a lot more than usual?     Thoughts that you would be better off dead, or of hurting yourself in some way?     PHQ-9 Total Score     If you checked off any problems, how difficult have these problems made it for you to do your work, take care of things at home, or get along with other people?       Change in PHQ-9 since last measure: -16 (16)    VIOLETTE-7       Change in VIOLETTE-7 since last measure: -19 (19)    Problem List:  Patient Active Problem List   Diagnosis    Stroke    VIOLETTE (generalized anxiety disorder)    History of stroke    Cat bite    Attention deficit hyperactivity disorder (ADHD), combined type    Major depressive disorder, recurrent episode, in partial remission     Allergy:   Allergies   Allergen Reactions    Naproxen Hives and Anaphylaxis      Discontinued Medications:  Medications Discontinued During This Encounter   Medication Reason    DULoxetine (CYMBALTA) 30 MG capsule     DULoxetine (CYMBALTA) 60 MG capsule Reorder         Current Medications:   Current Outpatient Medications   Medication Sig Dispense Refill    DULoxetine (CYMBALTA) 60 MG capsule Take 2 capsules by mouth Daily. 60 capsule 1    amphetamine-dextroamphetamine (ADDERALL) 20 MG tablet Take 1 tablet by mouth Every Afternoon. 30 tablet 0    [START ON 2/14/2025] amphetamine-dextroamphetamine (ADDERALL) 20 MG tablet Take 1  tablet by mouth Every Afternoon. 30 tablet 0    amphetamine-dextroamphetamine (Adderall) 30 MG tablet Take 1 tablet by mouth Every Morning. 30 tablet 0    [START ON 2/14/2025] amphetamine-dextroamphetamine (Adderall) 30 MG tablet Take 1 tablet by mouth Every Morning. 30 tablet 0    ARIPiprazole (ABILIFY) 2 MG tablet Take 1 tablet by mouth Daily. 30 tablet 1    aspirin 81 MG EC tablet Take 1 tablet by mouth Daily. 90 tablet 1    cyclobenzaprine (FLEXERIL) 10 MG tablet Take 1 tablet by mouth 3 (Three) Times a Day As Needed for Muscle Spasms. 9 tablet 0    hydrOXYzine (ATARAX) 25 MG tablet Take 1 tablet by mouth every night at bedtime. 90 tablet 0    ibuprofen (ADVIL,MOTRIN) 800 MG tablet Take 1 tablet by mouth 2 (Two) Times a Day As Needed.      traZODone (DESYREL) 50 MG tablet TAKE 1 TO 2 TABLETS BY MOUTH AT BEDTIME AS NEEDED FOR INSOMNIA 60 tablet 5     No current facility-administered medications for this visit.     Past Medical History:  Past Medical History:   Diagnosis Date    Acid reflux     ADHD (attention deficit hyperactivity disorder)     Allergic rhinitis     Anxiety     Arthritis     Depression     Hyperlipidemia     Hypertension     Leg pain     Seasonal allergies     Sinus trouble     Sleep disturbance     Stroke 03/16/2021    Thumb fracture 2012    Right     Past Surgical History:  Past Surgical History:   Procedure Laterality Date    ANKLE SURGERY      EYE SURGERY  1989    FRACTURE SURGERY  1/10/2014    Kneecap reconstruction    KNEE PCL RECONSTRUCTION Left 2014    open ligament     Mental Status Exam:   Appearance: well-groomed, sits upright, age-appropriate, normal habitus  Behavior: calm, cooperative, appropriate in demeanor, appropriate eye-contact  Mood/affect: dysphoric / mood-congruent, but appropriate in both range and amplitude  Speech: within expected variance; appropriate rate, appropriate rhythm, appropriate tone; non-pressured  Thought Process: linear, goal-directed; no FOI or ANASTACIA;  abstraction intact  Thought Content: coherent, devoid of overt delusions/perceptual disturbances  SI/HI: +passive thoughts of wanting to be dead; denies both SI and HI; exhibits future-orientation, self-advocates appropriately, no regular self-harm, no appreciable intent  Memory: no overt deficits  Orientation: oriented to person/place/time/situation  Concentration: appropriate during interview  Intellectual capacity: presumptively average  Insight: fair by given history/exam  Judgment: fair by given history/exam  Psychomotor: no appreciable latency/retardation/agitation/tremor  Gait: WNL    Review of Systems:  Review of Systems  Vital Signs:   There were no vitals taken for this visit.     Lab Results:   Admission on 11/28/2024, Discharged on 11/29/2024   Component Date Value Ref Range Status    QT Interval 11/28/2024 320  ms Final    QTC Interval 11/28/2024 435  ms Final    HS Troponin T 11/28/2024 <6  <14 ng/L Final    Glucose 11/28/2024 80  65 - 99 mg/dL Final    BUN 11/28/2024 15  6 - 20 mg/dL Final    Creatinine 11/28/2024 0.91  0.57 - 1.00 mg/dL Final    Sodium 11/28/2024 137  136 - 145 mmol/L Final    Potassium 11/28/2024 4.1  3.5 - 5.2 mmol/L Final    Slight hemolysis detected by analyzer. Result may be falsely elevated.    Chloride 11/28/2024 102  98 - 107 mmol/L Final    CO2 11/28/2024 21.4 (L)  22.0 - 29.0 mmol/L Final    Calcium 11/28/2024 9.4  8.6 - 10.5 mg/dL Final    Total Protein 11/28/2024 7.6  6.0 - 8.5 g/dL Final    Albumin 11/28/2024 4.5  3.5 - 5.2 g/dL Final    ALT (SGPT) 11/28/2024 11  1 - 33 U/L Final    AST (SGOT) 11/28/2024 19  1 - 32 U/L Final    Alkaline Phosphatase 11/28/2024 63  39 - 117 U/L Final    Total Bilirubin 11/28/2024 0.3  0.0 - 1.2 mg/dL Final    Globulin 11/28/2024 3.1  gm/dL Final    A/G Ratio 11/28/2024 1.5  g/dL Final    BUN/Creatinine Ratio 11/28/2024 16.5  7.0 - 25.0 Final    Anion Gap 11/28/2024 13.6  5.0 - 15.0 mmol/L Final    eGFR 11/28/2024 81.4  >60.0 mL/min/1.73  Final    Lipase 11/28/2024 27  13 - 60 U/L Final    Specimen hemolyzed.  Results may be falsely decreased.    proBNP 11/28/2024 38.0  0.0 - 450.0 pg/mL Final    Magnesium 11/28/2024 2.1  1.6 - 2.6 mg/dL Final    Extra Tube 11/28/2024 Hold for add-ons.   Final    Auto resulted.    Extra Tube 11/28/2024 hold for add-on   Final    Auto resulted    Extra Tube 11/28/2024 Hold for add-ons.   Final    Auto resulted.    Extra Tube 11/28/2024 Hold for add-ons.   Final    Auto resulted    WBC 11/28/2024 12.05 (H)  3.40 - 10.80 10*3/mm3 Final    RBC 11/28/2024 4.24  3.77 - 5.28 10*6/mm3 Final    Hemoglobin 11/28/2024 12.3  12.0 - 15.9 g/dL Final    Hematocrit 11/28/2024 38.9  34.0 - 46.6 % Final    MCV 11/28/2024 91.7  79.0 - 97.0 fL Final    MCH 11/28/2024 29.0  26.6 - 33.0 pg Final    MCHC 11/28/2024 31.6  31.5 - 35.7 g/dL Final    RDW 11/28/2024 13.5  12.3 - 15.4 % Final    RDW-SD 11/28/2024 46.3  37.0 - 54.0 fl Final    MPV 11/28/2024 10.7  6.0 - 12.0 fL Final    Platelets 11/28/2024 260  140 - 450 10*3/mm3 Final    Neutrophil % 11/28/2024 63.3  42.7 - 76.0 % Final    Lymphocyte % 11/28/2024 26.0  19.6 - 45.3 % Final    Monocyte % 11/28/2024 8.5  5.0 - 12.0 % Final    Eosinophil % 11/28/2024 1.5  0.3 - 6.2 % Final    Basophil % 11/28/2024 0.5  0.0 - 1.5 % Final    Immature Grans % 11/28/2024 0.2  0.0 - 0.5 % Final    Neutrophils, Absolute 11/28/2024 7.63 (H)  1.70 - 7.00 10*3/mm3 Final    Lymphocytes, Absolute 11/28/2024 3.13 (H)  0.70 - 3.10 10*3/mm3 Final    Monocytes, Absolute 11/28/2024 1.02 (H)  0.10 - 0.90 10*3/mm3 Final    Eosinophils, Absolute 11/28/2024 0.18  0.00 - 0.40 10*3/mm3 Final    Basophils, Absolute 11/28/2024 0.06  0.00 - 0.20 10*3/mm3 Final    Immature Grans, Absolute 11/28/2024 0.03  0.00 - 0.05 10*3/mm3 Final    nRBC 11/28/2024 0.0  0.0 - 0.2 /100 WBC Final    D-Dimer, Quantitative 11/28/2024 0.71 (H)  0.00 - 0.50 MCGFEU/mL Final   Lab on 10/29/2024   Component Date Value Ref Range Status    THC,  Screen, Urine 10/29/2024 Negative  Negative Final    Phencyclidine (PCP), Urine 10/29/2024 Negative  Negative Final    Cocaine Screen, Urine 10/29/2024 Negative  Negative Final    Methamphetamine, Ur 10/29/2024 Negative  Negative Final    Opiate Screen 10/29/2024 Negative  Negative Final    Amphetamine Screen, Urine 10/29/2024 Negative  Negative Final    Benzodiazepine Screen, Urine 10/29/2024 Negative  Negative Final    Tricyclic Antidepressants Screen 10/29/2024 Negative  Negative Final    Methadone Screen, Urine 10/29/2024 Negative  Negative Final    Barbiturates Screen, Urine 10/29/2024 Negative  Negative Final    Oxycodone Screen, Urine 10/29/2024 Negative  Negative Final    Buprenorphine, Screen, Urine 10/29/2024 Negative  Negative Final    Fentanyl, Urine 10/29/2024 Negative  Negative Final   Office Visit on 08/07/2024   Component Date Value Ref Range Status    TSH 08/07/2024 0.734  0.270 - 4.200 uIU/mL Final    WBC 08/07/2024 7.13  3.40 - 10.80 10*3/mm3 Final    RBC 08/07/2024 4.20  3.77 - 5.28 10*6/mm3 Final    Hemoglobin 08/07/2024 12.3  12.0 - 15.9 g/dL Final    Hematocrit 08/07/2024 36.9  34.0 - 46.6 % Final    MCV 08/07/2024 87.9  79.0 - 97.0 fL Final    MCH 08/07/2024 29.3  26.6 - 33.0 pg Final    MCHC 08/07/2024 33.3  31.5 - 35.7 g/dL Final    RDW 08/07/2024 12.4  12.3 - 15.4 % Final    RDW-SD 08/07/2024 38.9  37.0 - 54.0 fl Final    MPV 08/07/2024 10.9  6.0 - 12.0 fL Final    Platelets 08/07/2024 316  140 - 450 10*3/mm3 Final    Glucose 08/07/2024 91  65 - 99 mg/dL Final    BUN 08/07/2024 9  6 - 20 mg/dL Final    Creatinine 08/07/2024 1.04 (H)  0.57 - 1.00 mg/dL Final    Sodium 08/07/2024 138  136 - 145 mmol/L Final    Potassium 08/07/2024 4.3  3.5 - 5.2 mmol/L Final    Chloride 08/07/2024 105  98 - 107 mmol/L Final    CO2 08/07/2024 24.0  22.0 - 29.0 mmol/L Final    Calcium 08/07/2024 9.6  8.6 - 10.5 mg/dL Final    Total Protein 08/07/2024 7.2  6.0 - 8.5 g/dL Final    Albumin 08/07/2024 4.4  3.5 -  5.2 g/dL Final    ALT (SGPT) 08/07/2024 12  1 - 33 U/L Final    AST (SGOT) 08/07/2024 17  1 - 32 U/L Final    Alkaline Phosphatase 08/07/2024 61  39 - 117 U/L Final    Total Bilirubin 08/07/2024 0.3  0.0 - 1.2 mg/dL Final    Globulin 08/07/2024 2.8  gm/dL Final    A/G Ratio 08/07/2024 1.6  g/dL Final    BUN/Creatinine Ratio 08/07/2024 8.7  7.0 - 25.0 Final    Anion Gap 08/07/2024 9.0  5.0 - 15.0 mmol/L Final    eGFR 08/07/2024 69.4  >60.0 mL/min/1.73 Final    Total Cholesterol 08/07/2024 183  0 - 200 mg/dL Final    Triglycerides 08/07/2024 37  0 - 150 mg/dL Final    HDL Cholesterol 08/07/2024 87 (H)  40 - 60 mg/dL Final    LDL Cholesterol  08/07/2024 88  0 - 100 mg/dL Final    VLDL Cholesterol 08/07/2024 8  5 - 40 mg/dL Final    LDL/HDL Ratio 08/07/2024 1.02   Final     EKG Results:  No orders to display     Imaging Results:  CT Angiogram Chest Pulmonary Embolism  Result Date: 11/28/2024  1.No pulmonary embolism or aortic dissection is identified. 2.Mild dependent atelectasis in the lower lobes. The lungs are otherwise clear. Electronically Signed: Rangel Castellanos MD     XR Chest 1 View  Result Date: 11/28/2024  No active disease. Electronically Signed: Rangel Castellanos MD     ASSESSMENT AND PLAN:    ICD-10-CM ICD-9-CM   1. Severe episode of recurrent major depressive disorder, without psychotic features  F33.2 296.33   2. VIOLETTE (generalized anxiety disorder)  F41.1 300.02   3. Attention deficit hyperactivity disorder (ADHD), combined type  F90.2 314.01       39 y.o. female who presents today for follow-up. We have discussed the interval history and the treatment plan below:      Medication regimen: titrate duloxetine 120 mg QD, begin aripiprazole 2 mg QD - continue amphetamine IR, hydroxyzine, trazodone   Monitoring: reviewed labs/imaging as populated above  Primary psychotherapy: referred  Follow-up: 03/20/2025 as planned prior  Communications: N/A  Treatment plan: 11/21/2024    TREATMENT PLAN/GOALS: challenge  patterns of living conducive to symptom burden, implement recommended regimen as above with augmentative, intermittent supportive psychotherapy to reduce symptom burden. Patient acknowledged and verbally consented to continue treatment.      Billing: This encounter is of moderate complexity based on number/complexity of problems addressed today and risk of complications/morbidity: 2+ stable chronic illnesses and and prescription management. Additionally, I provided 16 minutes of dedicated psychotherapy to the patient, distinct from E/M services, as documented above. Start time: 1416. Stop time: 1432.     Electronically signed by Tejinder Amezcua MD, 02/04/25, 2726

## 2025-02-27 ENCOUNTER — TELEPHONE (OUTPATIENT)
Dept: PSYCHIATRY | Facility: CLINIC | Age: 40
End: 2025-02-27
Payer: COMMERCIAL

## 2025-02-27 NOTE — TELEPHONE ENCOUNTER
Pt called asking for a refill on her adderall.  Pt was informed that the medication should be available if she calls her pharmacy.  No further actions available at this time

## 2025-03-11 DIAGNOSIS — F90.2 ATTENTION DEFICIT HYPERACTIVITY DISORDER (ADHD), COMBINED TYPE: ICD-10-CM

## 2025-03-11 RX ORDER — DEXTROAMPHETAMINE SACCHARATE, AMPHETAMINE ASPARTATE, DEXTROAMPHETAMINE SULFATE AND AMPHETAMINE SULFATE 7.5; 7.5; 7.5; 7.5 MG/1; MG/1; MG/1; MG/1
30 TABLET ORAL EVERY MORNING
Qty: 30 TABLET | Refills: 0 | Status: SHIPPED | OUTPATIENT
Start: 2025-03-13

## 2025-03-11 RX ORDER — DEXTROAMPHETAMINE SACCHARATE, AMPHETAMINE ASPARTATE, DEXTROAMPHETAMINE SULFATE AND AMPHETAMINE SULFATE 5; 5; 5; 5 MG/1; MG/1; MG/1; MG/1
20 TABLET ORAL
Qty: 30 TABLET | Refills: 0 | Status: SHIPPED | OUTPATIENT
Start: 2025-03-26

## 2025-03-11 NOTE — TELEPHONE ENCOUNTER
REFILL REQUEST:     amphetamine-dextroamphetamine (ADDERALL) 20 MG tablet (02/14/2025)     amphetamine-dextroamphetamine (Adderall) 30 MG tablet (02/14/2025)     F/UP- 03/20/2025.  LOV: 02/04/2025.    LAST UDS:  Urine Drug Screen - Urine, Clean Catch (10/29/2024 11:47)

## 2025-03-12 NOTE — TELEPHONE ENCOUNTER
Called pt and notified her of the dates that her medication would be available.  No further questions at this time

## 2025-03-20 ENCOUNTER — OFFICE VISIT (OUTPATIENT)
Dept: PSYCHIATRY | Facility: CLINIC | Age: 40
End: 2025-03-20
Payer: COMMERCIAL

## 2025-03-20 VITALS
HEIGHT: 64 IN | BODY MASS INDEX: 19.46 KG/M2 | DIASTOLIC BLOOD PRESSURE: 97 MMHG | WEIGHT: 114 LBS | OXYGEN SATURATION: 100 % | HEART RATE: 100 BPM | SYSTOLIC BLOOD PRESSURE: 130 MMHG

## 2025-03-20 DIAGNOSIS — F90.2 ATTENTION DEFICIT HYPERACTIVITY DISORDER (ADHD), COMBINED TYPE: Primary | ICD-10-CM

## 2025-03-20 DIAGNOSIS — F41.1 GAD (GENERALIZED ANXIETY DISORDER): ICD-10-CM

## 2025-03-20 DIAGNOSIS — F33.41 MAJOR DEPRESSIVE DISORDER, RECURRENT EPISODE, IN PARTIAL REMISSION: ICD-10-CM

## 2025-03-20 RX ORDER — HYDROXYZINE HYDROCHLORIDE 25 MG/1
25 TABLET, FILM COATED ORAL
Qty: 90 TABLET | Refills: 0 | Status: SHIPPED | OUTPATIENT
Start: 2025-03-20

## 2025-03-20 RX ORDER — DULOXETIN HYDROCHLORIDE 60 MG/1
120 CAPSULE, DELAYED RELEASE ORAL DAILY
Qty: 180 CAPSULE | Refills: 0 | Status: SHIPPED | OUTPATIENT
Start: 2025-03-20

## 2025-03-20 RX ORDER — DEXTROAMPHETAMINE SACCHARATE, AMPHETAMINE ASPARTATE, DEXTROAMPHETAMINE SULFATE AND AMPHETAMINE SULFATE 7.5; 7.5; 7.5; 7.5 MG/1; MG/1; MG/1; MG/1
30 TABLET ORAL EVERY MORNING
Qty: 30 TABLET | Refills: 0 | Status: SHIPPED | OUTPATIENT
Start: 2025-04-11

## 2025-03-20 NOTE — TREATMENT PLAN
Multi-Disciplinary Problems (from Behavioral Health Treatment Plan)      Active Problems       Problem:  Patient Care Overview (Adult)  Start Date: 03/20/25      Problem Details: Treatment Planning for Laurel    Applicable diagnoses/problems:    - Anxiety: enhance engagement with regard to ego-syntonic items of living via routine building and disruption of inhibitory executive cognitive circuits; challenge avoidance of ego-syntonic items of living via disruption to perceived barriers; reduce salience of fears and overwhelm with regard to their effects on thinking and behavior.  - progress: moderate, progressing  - frequency of intervention: 4-8 weeks as scheduling allows  - duration of treatment: until optimized functional status is met    - Depression: enhance motivation and interest with regard to ego-syntonic items of living via routine building and disruption of inhibitory executive cognitive circuits; challenge withdrawal from ego-syntonic items of living; cultivate solomon and satisfaction via a consistent practice of appreciation; consistently practice redirection from intrusive ego-dystonic thoughts towards actionable items to reduce influence these thoughts have in emotions and behavior.  - progress: moderate, progressing  - frequency of intervention: 4-8 weeks as scheduling allows  - duration of treatment: until optimized functional status is met    - ADHD: practice behaviors that are conducive to creating functional routines (set a medication schedule, a sleep schedule, an activity schedule, limits on spending and other potential items of impulsivity); work towards optimizing a balance of utilizing compensatory mechanisms (including medications), accepting aid from applicable social communities/family, and validation of variable attention-stimulus traits in order to optimize daily functioning.  - progress: good, maintaining  - frequency of intervention: 4-8 weeks as scheduling allows  - duration of  treatment: until optimized functional status is met        Goal Priority Start Date Expected End Date End Date    Plan of Care Review -- 03/20/25 09/18/25 --

## 2025-03-20 NOTE — PROGRESS NOTES
"[facility]  Follow-up Visit    Chief Complaint: \"I went to the doctor for having trouble focusing... doing school, work... in the process of moving... I get overwhelmed and don't actually start on something... get distracted and do something else... need to sit down and study... focus on what I'm doing... hard to get started doing that.\"     History of Present Illness: Laurel Molina is a 39 y.o. female who presents today for follow-up. Last seen by this practice: 02/04 at which time duloxetine was titrated and aripiprazole was started.     Current treatment regimen includes:   - amphetamine 30 mg QAM + 20 mg QPM  - duloxetine 120 mg QD  - aripiprazole 2 mg QD (stopped taking this)  - hydroxyzine 25 mg HS  - trazodone 50 mg HS    Laurel presents on time and unaccompanied in no acute distress and engages with me appropriately. Depressive, anxious, and ADHD symptoms are adequately managed with current interventions. She feels her current regimen conveys sufficient benefit.  - sleep: generally adequate  - appetite: at baseline, no changes  - medication adverse effects: sleep disruptions and emotional blunting with aripiprazole    Interval History and Clinical Commentary:   Ego-syntonic. Laurel presents in a fashion consistent with prior assessments with regard to MSE.    Her relationship remains with the same concerns discussed at our last visit. 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 She plans to be finished by summer.     Her mother's monitoring scan for aneurysm was unremarkable for progression. She is set to have knee replacement surgery in coming months.    She's engaged with her art and is learning to play guitar. She recently got a commission order.    Axis I: ADHD, MDD, VIOLETTE  Axis II: defer  Axis III: defer  Axis IV: familial stress  Axis V: 70    Differential considerations: N/A    Adherence:  Treatment adherence is fair; issues in this regard have included: " missed appointments. Patient is advised not to misuse prescribed medications or to use them with any exogenous substances that aren't disclosed to this provider as they may interact with the regimen to the patient's detriment. The importance of adherence to the recommended treatment and interval follow-up appointments has been emphasized.     Education:  I have counseled Laurel with regard to diagnoses and the recommended treatment regimen as documented below. I have reiterated the importance of discussing with me any clinical changes that could represent potential adverse effects regarding the medication regimen.    Patient acknowledges the diagnoses per my rendered interpretation. Patient is agreeable to call 911 or go to the nearest ER should she become concerned for her own safety and/or the safety of those around her. Patient demonstrates understanding of potential risks/benefits/side effects associated with the recommended treatment regimen and is amenable to proceed in the fashion outlined below. Patient has been encouraged to cultivate constructive patterns of living including limiting daily caffeine intake, hydrating appropriately, regularly eating nutritious foods, engaging sleep hygiene practices, engaging in appropriate exposure to sunlight, engaging with hobbies in balance with life necessities, and exercising appropriate to their capacity to do so.    Risk:  There is no significant change to risk profile discernible during today's evaluation - do note that this is subject to change with the Jainism of new stressors, treatment non-adherence, use of substances, and/or new medical ails. There is no appreciable evidence of intent for harm to self or others. There are no appreciable indices of tierney/psychosis.    Contraception and mitigation of potential teratogenicity: she is not using contraception. I have advised there are risks taking any medication during pregnancy and, unfortunately, these risks are  poorly elaborated due to ethical concerns with studying medications in pregnant women. I have advised that in the case of pregnancy risk may be beyond what I'm able to advise and the general approach is that medication use should only be considered if potential benefits outweigh the possibility of risks by the patient's measure.    Psychotherapy:  - Time: 24 minutes  - interventions employed: the therapeutic alliance was strengthened to encourage the patient to express their thoughts and feelings freely. Esteem building was enhanced through praise, reassurance, normalizing/challenging, and encouragement as appropriate. Coping skills were enhanced to build distress tolerance skills and emotional regulation. Allowed patient to freely discuss issues without interruption or judgement with unconditional positive regard, active listening skills, and empathy. Provided a safe, confidential environment to facilitate the development of a positive therapeutic relationship and encourage open, honest communication. Assisted patient in processing session content; acknowledged and normalized/addressed, as appropriate, patient’s thoughts, feelings, and concerns by utilizing a person-centered approach in efforts to build appropriate rapport and a positive therapeutic relationship with open and honest communication.   - Diagnoses: see assessment and plan below  - Symptoms: see subjective above  - Goals              - patient: cultivate concentration and focus to improve functional capacity, sustain mood and manageable anxiety levels, nourish creativity              - provider: challenge patterns of living conducive to pathology, strengthen defenses, promote problems solving, restore adaptive functioning and provide symptom relief.  - Treatment plan: continue supportive psychotherapy in subsequent appointments to provide symptom relief; see assessment and plan below for additional details:              - iteration: 1              -  progress: moderate              - (X)illumination, (X)contextualization, (working)detection, (working)development, (-)elaboration, (working)refinement  - functional status: good  - mental status exam: as below  - prognosis: good    Psychiatric History:  Diagnoses: ADD, anxiety, depression  Outpatient history: denies  Inpatient history: denies  Medication trials: sertraline (worked okay), bupropion (ineffective 150 mg), buspirone (didn't seem to have an impact; was taking once daily), aripiprazole (sleep disruptions and emotional blunting)  Other treatment modalities: has not done therapy in the past  Presenting regimen: atomoxetine 40 mg QD, duloxetine 60 mg QD, hydroxyzine 25 mg HS, trazodone 50 mg HS  Self harm: years ago cut  Suicide attempts: denies     Social History:  Residence: lives in a house with her mother; was living with boyfriend and they had an argument eliciting the change in living for the time being; does not have children  Vocation:  ([employer])  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, 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)  Pertinent developmental history: issues with math growing up; denies abuse hx  Pertinent legal history: denies  Hobbies/interests: reading (historical fiction, gothic horror), art (drawing and painting), music (clarinet and piano)  Yazdanism: denies  Exercise: yes, walks three times weekly  Dietary habits: defer  Sleep hygiene: defer  Social habits: no pertinent issues  Sunlight: no concern for under-exposure  Caffeine intake: coffee in the AM with an energy drink, occasional soda, occasional tea  Hydration habits: not enough water   history: denies    Social History     Socioeconomic History    Marital status: Single   Tobacco Use    Smoking status: Never     Passive exposure: Past    Smokeless tobacco: Never   Vaping Use    Vaping status: Never Used   Substance and Sexual Activity    Alcohol use: Never    Drug use: Never    Sexual activity: Yes      Partners: Male     Birth control/protection: Coitus interruptus     Tobacco use counseling/intervention: N/A, patient does not use tobacco; patient has been counseled with regard to risks of tobacco use.    PHQ-9 Depression Screening  PHQ-9 Total Score:       Little interest or pleasure in doing things?     Feeling down, depressed, or hopeless?     PHQ-2 Total Score     Trouble falling or staying asleep, or sleeping too much?     Feeling tired or having little energy?     Poor appetite or overeating?     Feeling bad about yourself - or that you are a failure or have let yourself or your family down?     Trouble concentrating on things, such as reading the newspaper or watching television?     Moving or speaking so slowly that other people could have noticed? Or the opposite - being so fidgety or restless that you have been moving around a lot more than usual?     Thoughts that you would be better off dead, or of hurting yourself in some way?     PHQ-9 Total Score     If you checked off any problems, how difficult have these problems made it for you to do your work, take care of things at home, or get along with other people?       Change in PHQ-9 since last measure: N/A (0)    VIOLETTE-7       Change in VIOLETTE-7 since last measure: N/A (0)    Problem List:  Patient Active Problem List   Diagnosis    Stroke    VIOLETTE (generalized anxiety disorder)    History of stroke    Cat bite    Attention deficit hyperactivity disorder (ADHD), combined type    Major depressive disorder, recurrent episode, in partial remission     Allergy:   Allergies   Allergen Reactions    Naproxen Hives and Anaphylaxis      Discontinued Medications:  Medications Discontinued During This Encounter   Medication Reason    ARIPiprazole (ABILIFY) 2 MG tablet     cyclobenzaprine (FLEXERIL) 10 MG tablet     hydrOXYzine (ATARAX) 25 MG tablet Reorder    DULoxetine (CYMBALTA) 60 MG capsule Reorder    amphetamine-dextroamphetamine (Adderall) 30 MG tablet Reorder      Current Medications:   Current Outpatient Medications   Medication Sig Dispense Refill    [START ON 3/26/2025] amphetamine-dextroamphetamine (ADDERALL) 20 MG tablet Take 1 tablet by mouth Every Afternoon. 30 tablet 0    [START ON 4/11/2025] amphetamine-dextroamphetamine (Adderall) 30 MG tablet Take 1 tablet by mouth Every Morning. 30 tablet 0    aspirin 81 MG EC tablet Take 1 tablet by mouth Daily. 90 tablet 1    DULoxetine (CYMBALTA) 60 MG capsule Take 2 capsules by mouth Daily. 180 capsule 0    hydrOXYzine (ATARAX) 25 MG tablet Take 1 tablet by mouth every night at bedtime. 90 tablet 0    ibuprofen (ADVIL,MOTRIN) 800 MG tablet Take 1 tablet by mouth 2 (Two) Times a Day As Needed.      traZODone (DESYREL) 50 MG tablet TAKE 1 TO 2 TABLETS BY MOUTH AT BEDTIME AS NEEDED FOR INSOMNIA 60 tablet 5     No current facility-administered medications for this visit.     Past Medical History:  Past Medical History:   Diagnosis Date    Acid reflux     ADHD (attention deficit hyperactivity disorder)     Allergic     Allergic rhinitis     Anxiety     Arthritis     Depression     Hyperlipidemia     Hypertension     Leg pain     Seasonal allergies     Sinus trouble     Sleep disturbance     Stroke 03/16/2021    Thumb fracture 2012    Right     Past Surgical History:  Past Surgical History:   Procedure Laterality Date    ANKLE SURGERY      EYE SURGERY  1989    FRACTURE SURGERY  1/10/2014    Kneecap reconstruction    KNEE PCL RECONSTRUCTION Left 2014    open ligament     Mental Status Exam:   Appearance: well-groomed, sits upright, age-appropriate, normal habitus  Behavior: calm, cooperative, appropriate in demeanor, appropriate eye-contact  Mood/affect: improved / mood-congruent, appropriate in both range and amplitude  Speech: within expected variance; appropriate rate, appropriate rhythm, appropriate tone; non-pressured  Thought Process: linear, goal-directed; no FOI or ANASTACIA; abstraction intact  Thought Content: coherent,  "devoid of overt delusions/perceptual disturbances  SI/HI: denies both SI and HI; exhibits future-orientation, self-advocates appropriately, no regular self-harm, no appreciable intent  Memory: no overt deficits  Orientation: oriented to person/place/time/situation  Concentration: appropriate during interview  Intellectual capacity: presumptively average  Insight: fair by given history/exam  Judgment: fair by given history/exam  Psychomotor: no appreciable latency/retardation/agitation/tremor  Gait: WNL    Review of Systems:  Review of Systems   Constitutional:  Negative for activity change, appetite change and unexpected weight change.   Gastrointestinal:  Negative for abdominal pain and nausea.   Neurological:  Negative for dizziness.   Psychiatric/Behavioral:  Negative for agitation, confusion and sleep disturbance.      Vital Signs:   /97   Pulse 100   Ht 162.6 cm (64\")   Wt 51.7 kg (114 lb)   SpO2 100%   BMI 19.57 kg/m²      Lab Results:   Admission on 11/28/2024, Discharged on 11/29/2024   Component Date Value Ref Range Status    QT Interval 11/28/2024 320  ms Final    QTC Interval 11/28/2024 435  ms Final    HS Troponin T 11/28/2024 <6  <14 ng/L Final    Glucose 11/28/2024 80  65 - 99 mg/dL Final    BUN 11/28/2024 15  6 - 20 mg/dL Final    Creatinine 11/28/2024 0.91  0.57 - 1.00 mg/dL Final    Sodium 11/28/2024 137  136 - 145 mmol/L Final    Potassium 11/28/2024 4.1  3.5 - 5.2 mmol/L Final    Slight hemolysis detected by analyzer. Result may be falsely elevated.    Chloride 11/28/2024 102  98 - 107 mmol/L Final    CO2 11/28/2024 21.4 (L)  22.0 - 29.0 mmol/L Final    Calcium 11/28/2024 9.4  8.6 - 10.5 mg/dL Final    Total Protein 11/28/2024 7.6  6.0 - 8.5 g/dL Final    Albumin 11/28/2024 4.5  3.5 - 5.2 g/dL Final    ALT (SGPT) 11/28/2024 11  1 - 33 U/L Final    AST (SGOT) 11/28/2024 19  1 - 32 U/L Final    Alkaline Phosphatase 11/28/2024 63  39 - 117 U/L Final    Total Bilirubin 11/28/2024 0.3  0.0 - " 1.2 mg/dL Final    Globulin 11/28/2024 3.1  gm/dL Final    A/G Ratio 11/28/2024 1.5  g/dL Final    BUN/Creatinine Ratio 11/28/2024 16.5  7.0 - 25.0 Final    Anion Gap 11/28/2024 13.6  5.0 - 15.0 mmol/L Final    eGFR 11/28/2024 81.4  >60.0 mL/min/1.73 Final    Lipase 11/28/2024 27  13 - 60 U/L Final    Specimen hemolyzed.  Results may be falsely decreased.    proBNP 11/28/2024 38.0  0.0 - 450.0 pg/mL Final    Magnesium 11/28/2024 2.1  1.6 - 2.6 mg/dL Final    Extra Tube 11/28/2024 Hold for add-ons.   Final    Auto resulted.    Extra Tube 11/28/2024 hold for add-on   Final    Auto resulted    Extra Tube 11/28/2024 Hold for add-ons.   Final    Auto resulted.    Extra Tube 11/28/2024 Hold for add-ons.   Final    Auto resulted    WBC 11/28/2024 12.05 (H)  3.40 - 10.80 10*3/mm3 Final    RBC 11/28/2024 4.24  3.77 - 5.28 10*6/mm3 Final    Hemoglobin 11/28/2024 12.3  12.0 - 15.9 g/dL Final    Hematocrit 11/28/2024 38.9  34.0 - 46.6 % Final    MCV 11/28/2024 91.7  79.0 - 97.0 fL Final    MCH 11/28/2024 29.0  26.6 - 33.0 pg Final    MCHC 11/28/2024 31.6  31.5 - 35.7 g/dL Final    RDW 11/28/2024 13.5  12.3 - 15.4 % Final    RDW-SD 11/28/2024 46.3  37.0 - 54.0 fl Final    MPV 11/28/2024 10.7  6.0 - 12.0 fL Final    Platelets 11/28/2024 260  140 - 450 10*3/mm3 Final    Neutrophil % 11/28/2024 63.3  42.7 - 76.0 % Final    Lymphocyte % 11/28/2024 26.0  19.6 - 45.3 % Final    Monocyte % 11/28/2024 8.5  5.0 - 12.0 % Final    Eosinophil % 11/28/2024 1.5  0.3 - 6.2 % Final    Basophil % 11/28/2024 0.5  0.0 - 1.5 % Final    Immature Grans % 11/28/2024 0.2  0.0 - 0.5 % Final    Neutrophils, Absolute 11/28/2024 7.63 (H)  1.70 - 7.00 10*3/mm3 Final    Lymphocytes, Absolute 11/28/2024 3.13 (H)  0.70 - 3.10 10*3/mm3 Final    Monocytes, Absolute 11/28/2024 1.02 (H)  0.10 - 0.90 10*3/mm3 Final    Eosinophils, Absolute 11/28/2024 0.18  0.00 - 0.40 10*3/mm3 Final    Basophils, Absolute 11/28/2024 0.06  0.00 - 0.20 10*3/mm3 Final    Immature  Grans, Absolute 11/28/2024 0.03  0.00 - 0.05 10*3/mm3 Final    nRBC 11/28/2024 0.0  0.0 - 0.2 /100 WBC Final    D-Dimer, Quantitative 11/28/2024 0.71 (H)  0.00 - 0.50 MCGFEU/mL Final   Lab on 10/29/2024   Component Date Value Ref Range Status    THC, Screen, Urine 10/29/2024 Negative  Negative Final    Phencyclidine (PCP), Urine 10/29/2024 Negative  Negative Final    Cocaine Screen, Urine 10/29/2024 Negative  Negative Final    Methamphetamine, Ur 10/29/2024 Negative  Negative Final    Opiate Screen 10/29/2024 Negative  Negative Final    Amphetamine Screen, Urine 10/29/2024 Negative  Negative Final    Benzodiazepine Screen, Urine 10/29/2024 Negative  Negative Final    Tricyclic Antidepressants Screen 10/29/2024 Negative  Negative Final    Methadone Screen, Urine 10/29/2024 Negative  Negative Final    Barbiturates Screen, Urine 10/29/2024 Negative  Negative Final    Oxycodone Screen, Urine 10/29/2024 Negative  Negative Final    Buprenorphine, Screen, Urine 10/29/2024 Negative  Negative Final    Fentanyl, Urine 10/29/2024 Negative  Negative Final     EKG Results:  No orders to display     Imaging Results:  CT Angiogram Chest Pulmonary Embolism  Result Date: 11/28/2024  1.No pulmonary embolism or aortic dissection is identified. 2.Mild dependent atelectasis in the lower lobes. The lungs are otherwise clear. Electronically Signed: Rangel Castellanos MD     XR Chest 1 View  Result Date: 11/28/2024  No active disease. Electronically Signed: Rangel Castellanos MD     ASSESSMENT AND PLAN:    ICD-10-CM ICD-9-CM   1. Attention deficit hyperactivity disorder (ADHD), combined type  F90.2 314.01   2. Major depressive disorder, recurrent episode, in partial remission  F33.41 296.35   3. VIOLETTE (generalized anxiety disorder)  F41.1 300.02     39 y.o. female who presents today for follow-up. We have discussed the interval history and the treatment plan below:      Medication regimen: no change - continue duloxetine, amphetamine IR,  hydroxyzine, trazodone   Monitoring: reviewed labs/imaging as populated above  Primary psychotherapy: referred  Follow-up: 6 weeks  Communications: N/A  Treatment plan: due    TREATMENT PLAN/GOALS: challenge patterns of living conducive to symptom burden, implement recommended regimen as above with augmentative, intermittent supportive psychotherapy to reduce symptom burden. Patient acknowledged and verbally consented to continue treatment.      Billing: This encounter is of moderate complexity based on number/complexity of problems addressed today and risk of complications/morbidity: 2+ stable chronic illnesses and and prescription management. Additionally, I provided 24 minutes of dedicated psychotherapy to the patient, distinct from E/M services, as documented above. Start time: 1541. Stop time: 1605.     Electronically signed by Tejinder Amezcua MD, 03/20/25, [id number]

## 2025-03-30 DIAGNOSIS — F41.1 GAD (GENERALIZED ANXIETY DISORDER): ICD-10-CM

## 2025-03-30 DIAGNOSIS — F33.1 MAJOR DEPRESSIVE DISORDER, RECURRENT, MODERATE: ICD-10-CM

## 2025-03-31 RX ORDER — DULOXETIN HYDROCHLORIDE 30 MG/1
30 CAPSULE, DELAYED RELEASE ORAL DAILY
Qty: 90 CAPSULE | Refills: 0 | OUTPATIENT
Start: 2025-03-31

## 2025-03-31 NOTE — TELEPHONE ENCOUNTER
The original prescription was discontinued on 2/4/2025 by Tejinder Amezcua MD. Renewing this prescription may not be appropriate.

## 2025-04-07 ENCOUNTER — TELEPHONE (OUTPATIENT)
Dept: PSYCHIATRY | Facility: CLINIC | Age: 40
End: 2025-04-07
Payer: COMMERCIAL

## 2025-04-07 NOTE — TELEPHONE ENCOUNTER
PT(PATIENT) VERIFIED     PT(PATIENT) STATES SHE'S HAVING A REALLY HARD TIME RIGHT NOW     PT(PATIENT) STATES SHE'S HAVING SOME ISSUES WITH HER MOTHER(MEDICAL EMERGENCY) THAT'S CAUSED INCREASED HER ANXIETY     NEXT APPT WITH PROVIDER   Appointment with Tejinder Amezcua MD (2025)     PLEASE ADVISE

## 2025-04-07 NOTE — TELEPHONE ENCOUNTER
I'm sorry to hear this. We can meet early if desired. OK to schedule for more urgent appointment - please use 9:30, 2:50, or 3:50 appointment times preferentially (please avoid 12:30 time if feasible).

## 2025-04-08 ENCOUNTER — TELEMEDICINE (OUTPATIENT)
Dept: PSYCHIATRY | Facility: CLINIC | Age: 40
End: 2025-04-08
Payer: COMMERCIAL

## 2025-04-08 DIAGNOSIS — F90.2 ATTENTION DEFICIT HYPERACTIVITY DISORDER (ADHD), COMBINED TYPE: ICD-10-CM

## 2025-04-08 DIAGNOSIS — F33.41 MAJOR DEPRESSIVE DISORDER, RECURRENT EPISODE, IN PARTIAL REMISSION: ICD-10-CM

## 2025-04-08 DIAGNOSIS — F41.1 GAD (GENERALIZED ANXIETY DISORDER): Primary | ICD-10-CM

## 2025-04-08 RX ORDER — LORAZEPAM 1 MG/1
1 TABLET ORAL DAILY PRN
Qty: 10 TABLET | Refills: 1 | Status: SHIPPED | OUTPATIENT
Start: 2025-04-08

## 2025-04-08 RX ORDER — DEXTROAMPHETAMINE SACCHARATE, AMPHETAMINE ASPARTATE, DEXTROAMPHETAMINE SULFATE AND AMPHETAMINE SULFATE 5; 5; 5; 5 MG/1; MG/1; MG/1; MG/1
20 TABLET ORAL
Qty: 30 TABLET | Refills: 0 | Status: SHIPPED | OUTPATIENT
Start: 2025-04-29

## 2025-04-08 NOTE — PROGRESS NOTES
"[facility]  Follow-up Visit    Chief Complaint: \"I went to the doctor for having trouble focusing... doing school, work... in the process of moving... I get overwhelmed and don't actually start on something... get distracted and do something else... need to sit down and study... focus on what I'm doing... hard to get started doing that.\"     History of Present Illness: Laurel Molina is a 39 y.o. female who presents today for follow-up. Last seen by this practice: 03/20 at which time no changes were made to her regimen.     Current treatment regimen includes:   - amphetamine 30 mg QAM + 20 mg QPM  - duloxetine 120 mg QD  - aripiprazole 2 mg QD (stopped taking this)  - hydroxyzine 25 mg HS  - trazodone 50 mg HS    Laurel presents on time and unaccompanied in no acute distress and engages with me appropriately. Anxious symptoms have been more difficult to manage. Depressive and ADHD symptoms are adequately managed with current interventions. She is amenable to use of a PRN anxiolytic.  - sleep: generally adequate  - appetite: at baseline, no changes  - medication adverse effects: sleep disruptions and emotional blunting with aripiprazole    Interval History and Clinical Commentary:   Ego-syntonic. Laurel presents in a fashion consistent with prior assessments with regard to MSE.    Her relationship remains with the same concerns discussed at our last visit. 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 She plans to be finished by summer. 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    Her mother had a fall, had a \"mental breakdown\" after Laurel tended to her to ensure she was okay. She is since doing okay; surgery is scheduled for the end of this month. The following Monday Laurel had a meeting at work that felt generally \"hostile\"; the workplace has been feeling tense.     She's not had as much time to invest in creative outlets " as she has in the past. She feels anxious and thusly motivationally paralyzed.    Axis I: ADHD, MDD, VIOLETTE  Axis II: defer  Axis III: defer  Axis IV: familial stress  Axis V: 70    Differential considerations: N/A    Adherence:  Treatment adherence is fair; issues in this regard have included: missed appointments. Patient is advised not to misuse prescribed medications or to use them with any exogenous substances that aren't disclosed to this provider as they may interact with the regimen to the patient's detriment. The importance of adherence to the recommended treatment and interval follow-up appointments has been emphasized.     Education:  I have counseled Laurel with regard to diagnoses and the recommended treatment regimen as documented below: I will be prescribing lorazepam for VIOLETTE. Patient has been advised that long-term use of this agent can foster tachyphylaxis, dependence, and severe/life-threatening withdrawal with abrupt discontinuation - this agent will be used sparingly and as a rescue during periods of severe anxiety to augment more regular treatment options. Patient has been advised that this agent can contribute to falls, confusion, sedation, and stunted psychological convalescence. Patient has been made aware of the significant diminishment to respiratory drive when this agent is used in combination with opioids. I've advised to refrain from using this medication prior to driving or operating machinery. I have specifically reviewed issues related to misuse and diversion with the patient today. I have reiterated the importance of discussing with me any clinical changes that could represent potential adverse effects regarding the medication regimen.    Patient acknowledges the diagnoses per my rendered interpretation. Patient is agreeable to call 911 or go to the nearest ER should she become concerned for her own safety and/or the safety of those around her. Patient demonstrates understanding of  potential risks/benefits/side effects associated with the recommended treatment regimen and is amenable to proceed in the fashion outlined below. Patient has been encouraged to cultivate constructive patterns of living including limiting daily caffeine intake, hydrating appropriately, regularly eating nutritious foods, engaging sleep hygiene practices, engaging in appropriate exposure to sunlight, engaging with hobbies in balance with life necessities, and exercising appropriate to their capacity to do so.    Risk:  There is no significant change to risk profile discernible during today's evaluation - do note that this is subject to change with the Oriental orthodox of new stressors, treatment non-adherence, use of substances, and/or new medical ails. There is no appreciable evidence of intent for harm to self or others. There are no appreciable indices of tierney/psychosis.    Contraception and mitigation of potential teratogenicity: she is not using contraception. I have advised there are risks taking any medication during pregnancy and, unfortunately, these risks are poorly elaborated due to ethical concerns with studying medications in pregnant women. I have advised that in the case of pregnancy risk may be beyond what I'm able to advise and the general approach is that medication use should only be considered if potential benefits outweigh the possibility of risks by the patient's measure.    Psychotherapy:  - Time: 14 minutes  - interventions employed: the therapeutic alliance was strengthened to encourage the patient to express their thoughts and feelings freely. Esteem building was enhanced through praise, reassurance, normalizing/challenging, and encouragement as appropriate. Coping skills were enhanced to build distress tolerance skills and emotional regulation. Allowed patient to freely discuss issues without interruption or judgement with unconditional positive regard, active listening skills, and empathy. Provided a  safe, confidential environment to facilitate the development of a positive therapeutic relationship and encourage open, honest communication. Assisted patient in processing session content; acknowledged and normalized/addressed, as appropriate, patient’s thoughts, feelings, and concerns by utilizing a person-centered approach in efforts to build appropriate rapport and a positive therapeutic relationship with open and honest communication.   - Diagnoses: see assessment and plan below  - Symptoms: see subjective above  - Goals              - patient: cultivate concentration and focus to improve functional capacity, sustain mood and manageable anxiety levels, nourish creativity              - provider: challenge patterns of living conducive to pathology, strengthen defenses, promote problems solving, restore adaptive functioning and provide symptom relief.  - Treatment plan: continue supportive psychotherapy in subsequent appointments to provide symptom relief; see assessment and plan below for additional details:              - iteration: 1              - progress: moderate              - (X)illumination, (X)contextualization, (working)detection, (working)development, (-)elaboration, (working)refinement  - functional status: good  - mental status exam: as below  - prognosis: good    Psychiatric History:  Diagnoses: ADD, anxiety, depression  Outpatient history: denies  Inpatient history: denies  Medication trials: sertraline (worked okay), bupropion (ineffective 150 mg), buspirone (didn't seem to have an impact; was taking once daily), aripiprazole (sleep disruptions and emotional blunting)  Other treatment modalities: has not done therapy in the past  Presenting regimen: atomoxetine 40 mg QD, duloxetine 60 mg QD, hydroxyzine 25 mg HS, trazodone 50 mg HS  Self harm: years ago cut  Suicide attempts: denies     Social History:  Residence: lives in a house with her mother; was living with boyfriend and they had an  argument eliciting the change in living for the time being; does not have children  Vocation:  ([employer])  Education: working on associate's degree (, 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)  Pertinent developmental history: issues with math growing up; denies abuse hx  Pertinent legal history: denies  Hobbies/interests: reading (historical fiction, gothic horror), art (drawing and painting), music (clarinet and piano)  Lutheran: denies  Exercise: yes, walks three times weekly  Dietary habits: defer  Sleep hygiene: defer  Social habits: no pertinent issues  Sunlight: no concern for under-exposure  Caffeine intake: coffee in the AM with an energy drink, occasional soda, occasional tea  Hydration habits: not enough water   history: denies    Social History     Socioeconomic History    Marital status: Single   Tobacco Use    Smoking status: Never     Passive exposure: Past    Smokeless tobacco: Never   Vaping Use    Vaping status: Never Used   Substance and Sexual Activity    Alcohol use: Never    Drug use: Never    Sexual activity: Yes     Partners: Male     Birth control/protection: Coitus interruptus     Tobacco use counseling/intervention: N/A, patient does not use tobacco; patient has been counseled with regard to risks of tobacco use.    PHQ-9 Depression Screening  PHQ-9 Total Score:       Little interest or pleasure in doing things?     Feeling down, depressed, or hopeless?     PHQ-2 Total Score     Trouble falling or staying asleep, or sleeping too much?     Feeling tired or having little energy?     Poor appetite or overeating?     Feeling bad about yourself - or that you are a failure or have let yourself or your family down?     Trouble concentrating on things, such as reading the newspaper or watching television?     Moving or speaking so slowly that other people could have noticed? Or the opposite - being so fidgety or restless that you have been moving around a lot more than  usual?     Thoughts that you would be better off dead, or of hurting yourself in some way?     PHQ-9 Total Score     If you checked off any problems, how difficult have these problems made it for you to do your work, take care of things at home, or get along with other people?       Change in PHQ-9 since last measure: N/A (0)    VIOLETTE-7       Change in VIOLETTE-7 since last measure: N/A (0)    Problem List:  Patient Active Problem List   Diagnosis    Stroke    VIOLETTE (generalized anxiety disorder)    History of stroke    Cat bite    Attention deficit hyperactivity disorder (ADHD), combined type    Major depressive disorder, recurrent episode, in partial remission     Allergy:   Allergies   Allergen Reactions    Naproxen Hives and Anaphylaxis      Discontinued Medications:  Medications Discontinued During This Encounter   Medication Reason    amphetamine-dextroamphetamine (ADDERALL) 20 MG tablet Reorder       Current Medications:   Current Outpatient Medications   Medication Sig Dispense Refill    [START ON 4/29/2025] amphetamine-dextroamphetamine (ADDERALL) 20 MG tablet Take 1 tablet by mouth Every Afternoon. 30 tablet 0    [START ON 4/11/2025] amphetamine-dextroamphetamine (Adderall) 30 MG tablet Take 1 tablet by mouth Every Morning. 30 tablet 0    aspirin 81 MG EC tablet Take 1 tablet by mouth Daily. 90 tablet 1    DULoxetine (CYMBALTA) 60 MG capsule Take 2 capsules by mouth Daily. 180 capsule 0    hydrOXYzine (ATARAX) 25 MG tablet Take 1 tablet by mouth every night at bedtime. 90 tablet 0    ibuprofen (ADVIL,MOTRIN) 800 MG tablet Take 1 tablet by mouth 2 (Two) Times a Day As Needed.      LORazepam (ATIVAN) 1 MG tablet Take 1 tablet by mouth Daily As Needed for Anxiety (use sparingly). 10 tablet 1    traZODone (DESYREL) 50 MG tablet TAKE 1 TO 2 TABLETS BY MOUTH AT BEDTIME AS NEEDED FOR INSOMNIA 60 tablet 5     No current facility-administered medications for this visit.     Past Medical History:  Past Medical History:    Diagnosis Date    Acid reflux     ADHD (attention deficit hyperactivity disorder)     Allergic     Allergic rhinitis     Anxiety     Arthritis     Depression     Hyperlipidemia     Hypertension     Leg pain     Seasonal allergies     Sinus trouble     Sleep disturbance     Stroke 03/16/2021    Thumb fracture 2012    Right     Past Surgical History:  Past Surgical History:   Procedure Laterality Date    ANKLE SURGERY      EYE SURGERY  1989    FRACTURE SURGERY  1/10/2014    Kneecap reconstruction    KNEE PCL RECONSTRUCTION Left 2014    open ligament     Mental Status Exam:   Appearance: well-groomed, sits upright, age-appropriate, average habitus  Behavior: calm, cooperative, appropriate in demeanor, appropriate eye-contact  Mood/affect: tense / mood-congruent, appropriate in both range and amplitude  Speech: within expected variance; appropriate rate, appropriate rhythm, appropriate tone; non-pressured  Thought Process: linear, goal-directed; no FOI or ANASTACIA; abstraction intact  Thought Content: coherent, devoid of overt delusions/perceptual disturbances  SI/HI: denies both SI and HI; exhibits future-orientation, self-advocates appropriately, no regular self-harm, no appreciable intent  Memory: no overt deficits  Orientation: oriented to person/place/time/situation  Concentration: appropriate during interview  Intellectual capacity: presumptively average  Insight: fair by given history/exam  Judgment: fair by given history/exam  Psychomotor: no appreciable latency/retardation/agitation/tremor  Gait: WNL    Review of Systems:  Review of Systems   Constitutional:  Negative for activity change, appetite change and unexpected weight change.   Gastrointestinal:  Negative for abdominal pain and nausea.   Neurological:  Negative for dizziness.   Psychiatric/Behavioral:  Negative for agitation, confusion and sleep disturbance.      Vital Signs:   There were no vitals taken for this visit.     Lab Results:   Admission on  11/28/2024, Discharged on 11/29/2024   Component Date Value Ref Range Status    QT Interval 11/28/2024 320  ms Final    QTC Interval 11/28/2024 435  ms Final    HS Troponin T 11/28/2024 <6  <14 ng/L Final    Glucose 11/28/2024 80  65 - 99 mg/dL Final    BUN 11/28/2024 15  6 - 20 mg/dL Final    Creatinine 11/28/2024 0.91  0.57 - 1.00 mg/dL Final    Sodium 11/28/2024 137  136 - 145 mmol/L Final    Potassium 11/28/2024 4.1  3.5 - 5.2 mmol/L Final    Slight hemolysis detected by analyzer. Result may be falsely elevated.    Chloride 11/28/2024 102  98 - 107 mmol/L Final    CO2 11/28/2024 21.4 (L)  22.0 - 29.0 mmol/L Final    Calcium 11/28/2024 9.4  8.6 - 10.5 mg/dL Final    Total Protein 11/28/2024 7.6  6.0 - 8.5 g/dL Final    Albumin 11/28/2024 4.5  3.5 - 5.2 g/dL Final    ALT (SGPT) 11/28/2024 11  1 - 33 U/L Final    AST (SGOT) 11/28/2024 19  1 - 32 U/L Final    Alkaline Phosphatase 11/28/2024 63  39 - 117 U/L Final    Total Bilirubin 11/28/2024 0.3  0.0 - 1.2 mg/dL Final    Globulin 11/28/2024 3.1  gm/dL Final    A/G Ratio 11/28/2024 1.5  g/dL Final    BUN/Creatinine Ratio 11/28/2024 16.5  7.0 - 25.0 Final    Anion Gap 11/28/2024 13.6  5.0 - 15.0 mmol/L Final    eGFR 11/28/2024 81.4  >60.0 mL/min/1.73 Final    Lipase 11/28/2024 27  13 - 60 U/L Final    Specimen hemolyzed.  Results may be falsely decreased.    proBNP 11/28/2024 38.0  0.0 - 450.0 pg/mL Final    Magnesium 11/28/2024 2.1  1.6 - 2.6 mg/dL Final    Extra Tube 11/28/2024 Hold for add-ons.   Final    Auto resulted.    Extra Tube 11/28/2024 hold for add-on   Final    Auto resulted    Extra Tube 11/28/2024 Hold for add-ons.   Final    Auto resulted.    Extra Tube 11/28/2024 Hold for add-ons.   Final    Auto resulted    WBC 11/28/2024 12.05 (H)  3.40 - 10.80 10*3/mm3 Final    RBC 11/28/2024 4.24  3.77 - 5.28 10*6/mm3 Final    Hemoglobin 11/28/2024 12.3  12.0 - 15.9 g/dL Final    Hematocrit 11/28/2024 38.9  34.0 - 46.6 % Final    MCV 11/28/2024 91.7  79.0 - 97.0  fL Final    MCH 11/28/2024 29.0  26.6 - 33.0 pg Final    MCHC 11/28/2024 31.6  31.5 - 35.7 g/dL Final    RDW 11/28/2024 13.5  12.3 - 15.4 % Final    RDW-SD 11/28/2024 46.3  37.0 - 54.0 fl Final    MPV 11/28/2024 10.7  6.0 - 12.0 fL Final    Platelets 11/28/2024 260  140 - 450 10*3/mm3 Final    Neutrophil % 11/28/2024 63.3  42.7 - 76.0 % Final    Lymphocyte % 11/28/2024 26.0  19.6 - 45.3 % Final    Monocyte % 11/28/2024 8.5  5.0 - 12.0 % Final    Eosinophil % 11/28/2024 1.5  0.3 - 6.2 % Final    Basophil % 11/28/2024 0.5  0.0 - 1.5 % Final    Immature Grans % 11/28/2024 0.2  0.0 - 0.5 % Final    Neutrophils, Absolute 11/28/2024 7.63 (H)  1.70 - 7.00 10*3/mm3 Final    Lymphocytes, Absolute 11/28/2024 3.13 (H)  0.70 - 3.10 10*3/mm3 Final    Monocytes, Absolute 11/28/2024 1.02 (H)  0.10 - 0.90 10*3/mm3 Final    Eosinophils, Absolute 11/28/2024 0.18  0.00 - 0.40 10*3/mm3 Final    Basophils, Absolute 11/28/2024 0.06  0.00 - 0.20 10*3/mm3 Final    Immature Grans, Absolute 11/28/2024 0.03  0.00 - 0.05 10*3/mm3 Final    nRBC 11/28/2024 0.0  0.0 - 0.2 /100 WBC Final    D-Dimer, Quantitative 11/28/2024 0.71 (H)  0.00 - 0.50 MCGFEU/mL Final   Lab on 10/29/2024   Component Date Value Ref Range Status    THC, Screen, Urine 10/29/2024 Negative  Negative Final    Phencyclidine (PCP), Urine 10/29/2024 Negative  Negative Final    Cocaine Screen, Urine 10/29/2024 Negative  Negative Final    Methamphetamine, Ur 10/29/2024 Negative  Negative Final    Opiate Screen 10/29/2024 Negative  Negative Final    Amphetamine Screen, Urine 10/29/2024 Negative  Negative Final    Benzodiazepine Screen, Urine 10/29/2024 Negative  Negative Final    Tricyclic Antidepressants Screen 10/29/2024 Negative  Negative Final    Methadone Screen, Urine 10/29/2024 Negative  Negative Final    Barbiturates Screen, Urine 10/29/2024 Negative  Negative Final    Oxycodone Screen, Urine 10/29/2024 Negative  Negative Final    Buprenorphine, Screen, Urine 10/29/2024  Negative  Negative Final    Fentanyl, Urine 10/29/2024 Negative  Negative Final     EKG Results:  No orders to display     Imaging Results:  No Images in the past 120 days found.    ASSESSMENT AND PLAN:    ICD-10-CM ICD-9-CM   1. VIOLETTE (generalized anxiety disorder)  F41.1 300.02   2. Attention deficit hyperactivity disorder (ADHD), combined type  F90.2 314.01   3. Major depressive disorder, recurrent episode, in partial remission  F33.41 296.35     39 y.o. female who presents today for follow-up. We have discussed the interval history and the treatment plan below:      Medication regimen: begin lorazepam 1 mg QD PRN anxiety (#10) - continue duloxetine, amphetamine IR, hydroxyzine, trazodone   Monitoring: reviewed labs/imaging as populated above  Primary psychotherapy: referred  Follow-up: 6 weeks  Communications: N/A  Treatment plan: 03/20/2025    TREATMENT PLAN/GOALS: challenge patterns of living conducive to symptom burden, implement recommended regimen as above with augmentative, intermittent supportive psychotherapy to reduce symptom burden. Patient acknowledged and verbally consented to continue treatment.      Billing: This encounter is of moderate complexity based on number/complexity of problems addressed today and risk of complications/morbidity: 2+ stable chronic illnesses and and prescription management.     Electronically signed by Tejinder Amezcua MD, 04/08/25, [id number]

## 2025-04-11 DIAGNOSIS — F90.2 ATTENTION DEFICIT HYPERACTIVITY DISORDER (ADHD), COMBINED TYPE: ICD-10-CM

## 2025-04-11 RX ORDER — DEXTROAMPHETAMINE SACCHARATE, AMPHETAMINE ASPARTATE, DEXTROAMPHETAMINE SULFATE AND AMPHETAMINE SULFATE 7.5; 7.5; 7.5; 7.5 MG/1; MG/1; MG/1; MG/1
30 TABLET ORAL EVERY MORNING
Qty: 30 TABLET | Refills: 0 | OUTPATIENT
Start: 2025-04-11

## 2025-04-11 NOTE — TELEPHONE ENCOUNTER
CONTROLLED MEDICATION REFILL REQUEST    STATE REGULATION APPT EVERY 3 MONTHS     UDS(URINE DRUG SCREEN) EVERY 6 MONTHS OR UP TO PROVIDER PREFERENCE   (BONNIE BURGESS & MIR 1 PER YEAR)     NEW NARC CONSENT EVERY YEAR      MEDICATION: amphetamine-dextroamphetamine (Adderall) 30 MG tablet (04/11/2025)  amphetamine-dextroamphetamine (ADDERALL) 20 MG tablet (04/29/2025)    TOO SOON FOR REFILL      NEXT OFFICE VISIT: Appointment with Tejinder Amezcua MD (05/01/2025)     LAST OFFICE VISIT: Telemedicine with Tejinder Amezcua MD (04/08/2025)     NARC CONSENT: CONTROLLED SUBSTANCE AGREEMENT - SCAN - CONTROLLED SUBTANCE AGREEMENT, BEHAVIORAL HEALTH, 07/19/2023 (07/19/2023)     URINE DRUG SCREEN(STANDING ORDER)   (BONNIE HESTER 1 PER YEAR): Urine Drug Screen - Urine, Clean Catch (10/29/2024 11:47)  Fentanyl, Urine - Urine, Clean Catch (10/29/2024 11:47)    PROVIDER PLEASE ADVISE

## 2025-04-21 DIAGNOSIS — F33.1 MAJOR DEPRESSIVE DISORDER, RECURRENT, MODERATE: ICD-10-CM

## 2025-04-21 DIAGNOSIS — F41.1 GAD (GENERALIZED ANXIETY DISORDER): ICD-10-CM

## 2025-04-22 RX ORDER — DULOXETIN HYDROCHLORIDE 30 MG/1
30 CAPSULE, DELAYED RELEASE ORAL DAILY
Qty: 90 CAPSULE | Refills: 0 | OUTPATIENT
Start: 2025-04-22

## 2025-04-22 NOTE — TELEPHONE ENCOUNTER
REFILL REQUEST:     DULoxetine (CYMBALTA) 30 MG capsule (11/21/2024)    -MEDICATION WAS DISCONTINUED ON 02/04/2025.    F/UP- 05/01/2025.  LOV: 04/08/2025.

## 2025-04-30 NOTE — PROGRESS NOTES
"[facility]  Follow-up Visit    Chief Complaint: \"I went to the doctor for having trouble focusing... doing school, work... in the process of moving... I get overwhelmed and don't actually start on something... get distracted and do something else... need to sit down and study... focus on what I'm doing... hard to get started doing that.\"     History of Present Illness: Laurel Molina is a 39 y.o. female who presents today for follow-up. Last seen by this practice: 04/08 at which time no changes were made to her regimen. Services today rendered via telehealth ([facility]) for which the patient provided informed consent. Patient is aware she can refuse to be seen remotely at any time. Patient was located in a secure, remote environment (at home) as was the provider (in-office). I confirmed the patient's identity prior to evaluation and reiterated my credentials; there were no technical issues during the session today.    Current treatment regimen includes:   - amphetamine 30 mg QAM + 20 mg QPM  - duloxetine 120 mg QD  - aripiprazole 2 mg QD (stopped taking this)  - hydroxyzine 25 mg HS  - trazodone 50 mg HS  - lorazepam 1 mg QD PRN anxiety (#10)    Laurel presents on time and unaccompanied in no acute distress and engages with me appropriately. Anxious symptoms have remained somewhat difficult to manage, though lorazepam has been of some benefit; she is amenable to try an optimized dose. Depressive and ADHD symptoms are manageable with current interventions.   - sleep: generally adequate  - appetite: at baseline, no changes  - medication adverse effects: denies    Interval History and Clinical Commentary:   Ego-syntonic. Laurel presents in a fashion consistent with prior assessments with regard to MSE.    Her relationship remains with the same concerns discussed at our last visit. 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 She plans to be finished by summer. 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    Her mother had surgery and is enrolled in PT, recovering as expected.    Laurel recently cracked a tooth on candy and this was pulled.    Axis I: ADHD, MDD, VIOLETTE  Axis II: defer  Axis III: defer  Axis IV: familial stress, vocational stress  Axis V: 70    Differential considerations: N/A    Adherence:  Treatment adherence is fair; issues in this regard have included: missed appointments. Patient is advised not to misuse prescribed medications or to use them with any exogenous substances that aren't disclosed to this provider as they may interact with the regimen to the patient's detriment. The importance of adherence to the recommended treatment and interval follow-up appointments has been emphasized.     Education:  I have counseled Laurel with regard to diagnoses and the recommended treatment regimen as documented below: I will be prescribing lorazepam for VIOLETTE. Patient has been advised that long-term use of this agent can foster tachyphylaxis, dependence, and severe/life-threatening withdrawal with abrupt discontinuation - this agent will be used sparingly and as a rescue during periods of severe anxiety to augment more regular treatment options. Patient has been advised that this agent can contribute to falls, confusion, sedation, and stunted psychological convalescence. Patient has been made aware of the significant diminishment to respiratory drive when this agent is used in combination with opioids. I've advised to refrain from using this medication prior to driving or operating machinery. I have specifically reviewed issues related to misuse and diversion with the patient today. I have reiterated the importance of discussing with me any clinical changes that could represent potential adverse effects regarding the medication regimen.    Patient acknowledges the diagnoses per my rendered interpretation. Patient is agreeable to call 911 or  go to the nearest ER should she become concerned for her own safety and/or the safety of those around her. Patient demonstrates understanding of potential risks/benefits/side effects associated with the recommended treatment regimen and is amenable to proceed in the fashion outlined below. Patient has been encouraged to cultivate constructive patterns of living including limiting daily caffeine intake, hydrating appropriately, regularly eating nutritious foods, engaging sleep hygiene practices, engaging in appropriate exposure to sunlight, engaging with hobbies in balance with life necessities, and exercising appropriate to their capacity to do so.    Risk:  There is no significant change to risk profile discernible during today's evaluation - do note that this is subject to change with the Anabaptism of new stressors, treatment non-adherence, use of substances, and/or new medical ails. There is no appreciable evidence of intent for harm to self or others. There are no appreciable indices of tierney/psychosis.    Contraception and mitigation of potential teratogenicity: she is not using contraception. I have advised there are risks taking any medication during pregnancy and, unfortunately, these risks are poorly elaborated due to ethical concerns with studying medications in pregnant women. I have advised that in the case of pregnancy risk may be beyond what I'm able to advise and the general approach is that medication use should only be considered if potential benefits outweigh the possibility of risks by the patient's measure.    Psychotherapy:  - Time: 5 minutes  - interventions employed: the therapeutic alliance was strengthened to encourage the patient to express their thoughts and feelings freely. Esteem building was enhanced through praise, reassurance, normalizing/challenging, and encouragement as appropriate. Coping skills were enhanced to build distress tolerance skills and emotional regulation. Allowed patient  to freely discuss issues without interruption or judgement with unconditional positive regard, active listening skills, and empathy. Provided a safe, confidential environment to facilitate the development of a positive therapeutic relationship and encourage open, honest communication. Assisted patient in processing session content; acknowledged and normalized/addressed, as appropriate, patient’s thoughts, feelings, and concerns by utilizing a person-centered approach in efforts to build appropriate rapport and a positive therapeutic relationship with open and honest communication.   - Diagnoses: see assessment and plan below  - Symptoms: see subjective above  - Goals              - patient: cultivate concentration and focus to improve functional capacity, sustain mood and manageable anxiety levels, nourish creativity              - provider: challenge patterns of living conducive to pathology, strengthen defenses, promote problems solving, restore adaptive functioning and provide symptom relief.  - Treatment plan: continue supportive psychotherapy in subsequent appointments to provide symptom relief; see assessment and plan below for additional details:              - iteration: 1              - progress: moderate              - (X)illumination, (X)contextualization, (working)detection, (working)development, (-)elaboration, (working)refinement  - functional status: good  - mental status exam: as below  - prognosis: good    Psychiatric History:  Diagnoses: ADD, anxiety, depression  Outpatient history: denies  Inpatient history: denies  Medication trials: sertraline (worked okay), bupropion (ineffective 150 mg), buspirone (didn't seem to have an impact; was taking once daily), aripiprazole (sleep disruptions and emotional blunting)  Other treatment modalities: has not done therapy in the past  Presenting regimen: atomoxetine 40 mg QD, duloxetine 60 mg QD, hydroxyzine 25 mg HS, trazodone 50 mg HS  Self harm: years ago  cut  Suicide attempts: denies     Social History:  Residence: lives in a house with her mother; was living with boyfriend and they had an argument eliciting the change in living for the time being; does not have children  Vocation:  ([employer])  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, 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)  Pertinent developmental history: issues with math growing up; denies abuse hx  Pertinent legal history: denies  Hobbies/interests: reading (historical fiction, gothic horror), art (drawing and painting), music (clarinet and piano)  Lutheran: denies  Exercise: yes, walks three times weekly  Dietary habits: defer  Sleep hygiene: defer  Social habits: no pertinent issues  Sunlight: no concern for under-exposure  Caffeine intake: coffee in the AM with an energy drink, occasional soda, occasional tea  Hydration habits: not enough water   history: denies    Social History     Socioeconomic History    Marital status: Single   Tobacco Use    Smoking status: Never     Passive exposure: Past    Smokeless tobacco: Never   Vaping Use    Vaping status: Never Used   Substance and Sexual Activity    Alcohol use: Never    Drug use: Never    Sexual activity: Yes     Partners: Male     Birth control/protection: Coitus interruptus     Tobacco use counseling/intervention: N/A, patient does not use tobacco; patient has been counseled with regard to risks of tobacco use.    PHQ-9 Depression Screening  PHQ-9 Total Score:       Little interest or pleasure in doing things?     Feeling down, depressed, or hopeless?     PHQ-2 Total Score     Trouble falling or staying asleep, or sleeping too much?     Feeling tired or having little energy?     Poor appetite or overeating?     Feeling bad about yourself - or that you are a failure or have let yourself or your family down?     Trouble concentrating on things, such as reading the newspaper or watching television?     Moving or speaking so  slowly that other people could have noticed? Or the opposite - being so fidgety or restless that you have been moving around a lot more than usual?     Thoughts that you would be better off dead, or of hurting yourself in some way?     PHQ-9 Total Score     If you checked off any problems, how difficult have these problems made it for you to do your work, take care of things at home, or get along with other people?       Change in PHQ-9 since last measure: N/A (0)    VIOLETTE-7       Change in VIOLETTE-7 since last measure: N/A (0)    Problem List:  Patient Active Problem List   Diagnosis    Stroke    VIOLETTE (generalized anxiety disorder)    History of stroke    Cat bite    Attention deficit hyperactivity disorder (ADHD), combined type    Major depressive disorder, recurrent episode, in partial remission     Allergy:   Allergies   Allergen Reactions    Naproxen Hives and Anaphylaxis      Discontinued Medications:  There are no discontinued medications.    Current Medications:   Current Outpatient Medications   Medication Sig Dispense Refill    amphetamine-dextroamphetamine (ADDERALL) 20 MG tablet Take 1 tablet by mouth Every Afternoon. 30 tablet 0    amphetamine-dextroamphetamine (Adderall) 30 MG tablet Take 1 tablet by mouth Every Morning. 30 tablet 0    aspirin 81 MG EC tablet Take 1 tablet by mouth Daily. 90 tablet 1    DULoxetine (CYMBALTA) 60 MG capsule Take 2 capsules by mouth Daily. 180 capsule 0    hydrOXYzine (ATARAX) 25 MG tablet Take 1 tablet by mouth every night at bedtime. 90 tablet 0    ibuprofen (ADVIL,MOTRIN) 800 MG tablet Take 1 tablet by mouth 2 (Two) Times a Day As Needed.      LORazepam (ATIVAN) 1 MG tablet Take 1 tablet by mouth Daily As Needed for Anxiety (use sparingly). 10 tablet 1    traZODone (DESYREL) 50 MG tablet TAKE 1 TO 2 TABLETS BY MOUTH AT BEDTIME AS NEEDED FOR INSOMNIA 60 tablet 5     No current facility-administered medications for this visit.     Past Medical History:  Past Medical History:  "  Diagnosis Date    Acid reflux     ADHD (attention deficit hyperactivity disorder)     Allergic     Allergic rhinitis     Anxiety     Arthritis     Depression     Hyperlipidemia     Hypertension     Leg pain     Seasonal allergies     Sinus trouble     Sleep disturbance     Stroke 03/16/2021    Thumb fracture 2012    Right     Past Surgical History:  Past Surgical History:   Procedure Laterality Date    ANKLE SURGERY      EYE SURGERY  1989    FRACTURE SURGERY  1/10/2014    Kneecap reconstruction    KNEE PCL RECONSTRUCTION Left 2014    open ligament     Mental Status Exam:   Appearance: well-groomed, sits upright, age-appropriate, average habitus  Behavior: calm, cooperative, appropriate in demeanor, appropriate eye-contact  Mood/affect: fair / mood-congruent, appropriate in both range and amplitude  Speech: within expected variance; appropriate rate, appropriate rhythm, appropriate tone; non-pressured  Thought Process: linear, goal-directed; no FOI or ANASTACIA; abstraction intact  Thought Content: coherent, devoid of overt delusions/perceptual disturbances  SI/HI: denies both SI and HI; exhibits future-orientation, self-advocates appropriately, no regular self-harm, no appreciable intent  Memory: no overt deficits  Orientation: oriented to person/place/time/situation  Concentration: appropriate during interview  Intellectual capacity: presumptively average  Insight: fair by given history/exam  Judgment: fair by given history/exam  Psychomotor: no appreciable latency/retardation/agitation/tremor  Gait: WNL    Review of Systems:  Review of Systems   Constitutional:  Negative for activity change, appetite change and unexpected weight change.   Gastrointestinal:  Negative for abdominal pain and nausea.   Neurological:  Negative for dizziness.   Psychiatric/Behavioral:  Negative for agitation, confusion and sleep disturbance.      Vital Signs:   /87   Pulse 114   Ht 162.6 cm (64\")   Wt 65.9 kg (145 lb 3.2 oz)   BMI " 24.92 kg/m²      Lab Results:   Admission on 11/28/2024, Discharged on 11/29/2024   Component Date Value Ref Range Status    QT Interval 11/28/2024 320  ms Final    QTC Interval 11/28/2024 435  ms Final    HS Troponin T 11/28/2024 <6  <14 ng/L Final    Glucose 11/28/2024 80  65 - 99 mg/dL Final    BUN 11/28/2024 15  6 - 20 mg/dL Final    Creatinine 11/28/2024 0.91  0.57 - 1.00 mg/dL Final    Sodium 11/28/2024 137  136 - 145 mmol/L Final    Potassium 11/28/2024 4.1  3.5 - 5.2 mmol/L Final    Slight hemolysis detected by analyzer. Result may be falsely elevated.    Chloride 11/28/2024 102  98 - 107 mmol/L Final    CO2 11/28/2024 21.4 (L)  22.0 - 29.0 mmol/L Final    Calcium 11/28/2024 9.4  8.6 - 10.5 mg/dL Final    Total Protein 11/28/2024 7.6  6.0 - 8.5 g/dL Final    Albumin 11/28/2024 4.5  3.5 - 5.2 g/dL Final    ALT (SGPT) 11/28/2024 11  1 - 33 U/L Final    AST (SGOT) 11/28/2024 19  1 - 32 U/L Final    Alkaline Phosphatase 11/28/2024 63  39 - 117 U/L Final    Total Bilirubin 11/28/2024 0.3  0.0 - 1.2 mg/dL Final    Globulin 11/28/2024 3.1  gm/dL Final    A/G Ratio 11/28/2024 1.5  g/dL Final    BUN/Creatinine Ratio 11/28/2024 16.5  7.0 - 25.0 Final    Anion Gap 11/28/2024 13.6  5.0 - 15.0 mmol/L Final    eGFR 11/28/2024 81.4  >60.0 mL/min/1.73 Final    Lipase 11/28/2024 27  13 - 60 U/L Final    Specimen hemolyzed.  Results may be falsely decreased.    proBNP 11/28/2024 38.0  0.0 - 450.0 pg/mL Final    Magnesium 11/28/2024 2.1  1.6 - 2.6 mg/dL Final    Extra Tube 11/28/2024 Hold for add-ons.   Final    Auto resulted.    Extra Tube 11/28/2024 hold for add-on   Final    Auto resulted    Extra Tube 11/28/2024 Hold for add-ons.   Final    Auto resulted.    Extra Tube 11/28/2024 Hold for add-ons.   Final    Auto resulted    WBC 11/28/2024 12.05 (H)  3.40 - 10.80 10*3/mm3 Final    RBC 11/28/2024 4.24  3.77 - 5.28 10*6/mm3 Final    Hemoglobin 11/28/2024 12.3  12.0 - 15.9 g/dL Final    Hematocrit 11/28/2024 38.9  34.0 - 46.6  % Final    MCV 11/28/2024 91.7  79.0 - 97.0 fL Final    MCH 11/28/2024 29.0  26.6 - 33.0 pg Final    MCHC 11/28/2024 31.6  31.5 - 35.7 g/dL Final    RDW 11/28/2024 13.5  12.3 - 15.4 % Final    RDW-SD 11/28/2024 46.3  37.0 - 54.0 fl Final    MPV 11/28/2024 10.7  6.0 - 12.0 fL Final    Platelets 11/28/2024 260  140 - 450 10*3/mm3 Final    Neutrophil % 11/28/2024 63.3  42.7 - 76.0 % Final    Lymphocyte % 11/28/2024 26.0  19.6 - 45.3 % Final    Monocyte % 11/28/2024 8.5  5.0 - 12.0 % Final    Eosinophil % 11/28/2024 1.5  0.3 - 6.2 % Final    Basophil % 11/28/2024 0.5  0.0 - 1.5 % Final    Immature Grans % 11/28/2024 0.2  0.0 - 0.5 % Final    Neutrophils, Absolute 11/28/2024 7.63 (H)  1.70 - 7.00 10*3/mm3 Final    Lymphocytes, Absolute 11/28/2024 3.13 (H)  0.70 - 3.10 10*3/mm3 Final    Monocytes, Absolute 11/28/2024 1.02 (H)  0.10 - 0.90 10*3/mm3 Final    Eosinophils, Absolute 11/28/2024 0.18  0.00 - 0.40 10*3/mm3 Final    Basophils, Absolute 11/28/2024 0.06  0.00 - 0.20 10*3/mm3 Final    Immature Grans, Absolute 11/28/2024 0.03  0.00 - 0.05 10*3/mm3 Final    nRBC 11/28/2024 0.0  0.0 - 0.2 /100 WBC Final    D-Dimer, Quantitative 11/28/2024 0.71 (H)  0.00 - 0.50 MCGFEU/mL Final     EKG Results:  No orders to display     Imaging Results:  No Images in the past 120 days found.    ASSESSMENT AND PLAN:    ICD-10-CM ICD-9-CM   1. VIOLETTE (generalized anxiety disorder)  F41.1 300.02   2. Major depressive disorder, recurrent episode, in partial remission  F33.41 296.35   3. Attention deficit hyperactivity disorder (ADHD), combined type  F90.2 314.01     39 y.o. female who presents today for follow-up. We have discussed the interval history and the treatment plan below:      Medication regimen: titrate lorazepam to 2 mg QD PRN anxiety (#10) - continue duloxetine, amphetamine IR, hydroxyzine, trazodone   Monitoring: reviewed labs/imaging as populated above  Primary psychotherapy: referred  Follow-up: 6 weeks  Communications:  N/A  Treatment plan: 03/20/2025    TREATMENT PLAN/GOALS: challenge patterns of living conducive to symptom burden, implement recommended regimen as above with augmentative, intermittent supportive psychotherapy to reduce symptom burden. Patient acknowledged and verbally consented to continue treatment.      Billing: This encounter is of moderate complexity based on number/complexity of problems addressed today and risk of complications/morbidity: 2+ stable chronic illnesses and and prescription management.     Electronically signed by Tejinder Amezcua MD, 05/01/25, [id number]

## 2025-05-01 ENCOUNTER — OFFICE VISIT (OUTPATIENT)
Dept: PSYCHIATRY | Facility: CLINIC | Age: 40
End: 2025-05-01
Payer: COMMERCIAL

## 2025-05-01 VITALS
HEART RATE: 114 BPM | WEIGHT: 145.2 LBS | DIASTOLIC BLOOD PRESSURE: 87 MMHG | SYSTOLIC BLOOD PRESSURE: 128 MMHG | BODY MASS INDEX: 24.79 KG/M2 | HEIGHT: 64 IN

## 2025-05-01 DIAGNOSIS — F33.41 MAJOR DEPRESSIVE DISORDER, RECURRENT EPISODE, IN PARTIAL REMISSION: ICD-10-CM

## 2025-05-01 DIAGNOSIS — F41.1 GAD (GENERALIZED ANXIETY DISORDER): Primary | ICD-10-CM

## 2025-05-01 DIAGNOSIS — F90.2 ATTENTION DEFICIT HYPERACTIVITY DISORDER (ADHD), COMBINED TYPE: ICD-10-CM

## 2025-05-01 RX ORDER — DEXTROAMPHETAMINE SACCHARATE, AMPHETAMINE ASPARTATE, DEXTROAMPHETAMINE SULFATE AND AMPHETAMINE SULFATE 5; 5; 5; 5 MG/1; MG/1; MG/1; MG/1
20 TABLET ORAL
Qty: 30 TABLET | Refills: 0 | Status: SHIPPED | OUTPATIENT
Start: 2025-05-30

## 2025-05-01 RX ORDER — DEXTROAMPHETAMINE SACCHARATE, AMPHETAMINE ASPARTATE, DEXTROAMPHETAMINE SULFATE AND AMPHETAMINE SULFATE 7.5; 7.5; 7.5; 7.5 MG/1; MG/1; MG/1; MG/1
30 TABLET ORAL EVERY MORNING
Qty: 30 TABLET | Refills: 0 | Status: SHIPPED | OUTPATIENT
Start: 2025-05-23

## 2025-05-01 RX ORDER — LORAZEPAM 2 MG/1
2 TABLET ORAL DAILY PRN
Qty: 10 TABLET | Refills: 1 | Status: SHIPPED | OUTPATIENT
Start: 2025-05-01

## 2025-06-25 ENCOUNTER — OFFICE VISIT (OUTPATIENT)
Dept: PSYCHIATRY | Facility: CLINIC | Age: 40
End: 2025-06-25
Payer: COMMERCIAL

## 2025-06-25 VITALS
WEIGHT: 141.4 LBS | SYSTOLIC BLOOD PRESSURE: 121 MMHG | DIASTOLIC BLOOD PRESSURE: 74 MMHG | HEIGHT: 64 IN | HEART RATE: 105 BPM | BODY MASS INDEX: 24.14 KG/M2

## 2025-06-25 DIAGNOSIS — F51.01 PRIMARY INSOMNIA: ICD-10-CM

## 2025-06-25 DIAGNOSIS — F41.1 GAD (GENERALIZED ANXIETY DISORDER): Primary | ICD-10-CM

## 2025-06-25 DIAGNOSIS — F33.41 MAJOR DEPRESSIVE DISORDER, RECURRENT EPISODE, IN PARTIAL REMISSION: ICD-10-CM

## 2025-06-25 DIAGNOSIS — F90.2 ATTENTION DEFICIT HYPERACTIVITY DISORDER (ADHD), COMBINED TYPE: ICD-10-CM

## 2025-06-25 RX ORDER — DEXTROAMPHETAMINE SACCHARATE, AMPHETAMINE ASPARTATE, DEXTROAMPHETAMINE SULFATE AND AMPHETAMINE SULFATE 5; 5; 5; 5 MG/1; MG/1; MG/1; MG/1
20 TABLET ORAL
Qty: 30 TABLET | Refills: 0 | Status: SHIPPED | OUTPATIENT
Start: 2025-07-02

## 2025-06-25 RX ORDER — LORAZEPAM 2 MG/1
2 TABLET ORAL DAILY PRN
Qty: 10 TABLET | Refills: 1 | Status: SHIPPED | OUTPATIENT
Start: 2025-07-02

## 2025-06-25 RX ORDER — TRAZODONE HYDROCHLORIDE 50 MG/1
TABLET ORAL
Qty: 60 TABLET | Refills: 5 | Status: SHIPPED | OUTPATIENT
Start: 2025-06-25

## 2025-06-25 RX ORDER — HYDROXYZINE HYDROCHLORIDE 25 MG/1
25 TABLET, FILM COATED ORAL
Qty: 90 TABLET | Refills: 0 | Status: SHIPPED | OUTPATIENT
Start: 2025-06-25

## 2025-06-25 RX ORDER — DEXTROAMPHETAMINE SACCHARATE, AMPHETAMINE ASPARTATE, DEXTROAMPHETAMINE SULFATE AND AMPHETAMINE SULFATE 7.5; 7.5; 7.5; 7.5 MG/1; MG/1; MG/1; MG/1
30 TABLET ORAL EVERY MORNING
Qty: 30 TABLET | Refills: 0 | Status: SHIPPED | OUTPATIENT
Start: 2025-07-02

## 2025-06-25 RX ORDER — DULOXETIN HYDROCHLORIDE 60 MG/1
120 CAPSULE, DELAYED RELEASE ORAL DAILY
Qty: 180 CAPSULE | Refills: 0 | Status: SHIPPED | OUTPATIENT
Start: 2025-06-25
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
## 2025-07-22 DIAGNOSIS — F41.1 GAD (GENERALIZED ANXIETY DISORDER): ICD-10-CM

## 2025-07-22 RX ORDER — DULOXETIN HYDROCHLORIDE 60 MG/1
60 CAPSULE, DELAYED RELEASE ORAL DAILY
Qty: 90 CAPSULE | Refills: 3 | OUTPATIENT
Start: 2025-07-22

## 2025-07-22 NOTE — TELEPHONE ENCOUNTER
NEXT VISIT WITH PROVIDER   Appointment with Tejinder Amezcua MD (08/25/2025)     LAST SEEN BY PROVIDER   Office Visit with Tejinder Amezcua MD (06/25/2025)     LAST MED REFILL  DULoxetine (CYMBALTA) 60 MG capsule (06/25/2025)     TOO SOON FOR REFILL     PROVIDER PLEASE ADVISE

## 2025-08-07 DIAGNOSIS — F41.1 GAD (GENERALIZED ANXIETY DISORDER): ICD-10-CM

## 2025-08-07 DIAGNOSIS — F33.1 MAJOR DEPRESSIVE DISORDER, RECURRENT, MODERATE: ICD-10-CM

## 2025-08-07 DIAGNOSIS — F90.2 ATTENTION DEFICIT HYPERACTIVITY DISORDER (ADHD), COMBINED TYPE: ICD-10-CM

## 2025-08-07 RX ORDER — DEXTROAMPHETAMINE SACCHARATE, AMPHETAMINE ASPARTATE, DEXTROAMPHETAMINE SULFATE AND AMPHETAMINE SULFATE 7.5; 7.5; 7.5; 7.5 MG/1; MG/1; MG/1; MG/1
30 TABLET ORAL EVERY MORNING
Qty: 30 TABLET | Refills: 0 | Status: SHIPPED | OUTPATIENT
Start: 2025-08-13

## 2025-08-07 RX ORDER — DEXTROAMPHETAMINE SACCHARATE, AMPHETAMINE ASPARTATE, DEXTROAMPHETAMINE SULFATE AND AMPHETAMINE SULFATE 5; 5; 5; 5 MG/1; MG/1; MG/1; MG/1
20 TABLET ORAL
Qty: 30 TABLET | Refills: 0 | Status: SHIPPED | OUTPATIENT
Start: 2025-08-13

## 2025-08-07 RX ORDER — DULOXETIN HYDROCHLORIDE 60 MG/1
60 CAPSULE, DELAYED RELEASE ORAL DAILY
Qty: 90 CAPSULE | Refills: 3 | OUTPATIENT
Start: 2025-08-07

## 2025-08-07 RX ORDER — DULOXETIN HYDROCHLORIDE 30 MG/1
30 CAPSULE, DELAYED RELEASE ORAL DAILY
Qty: 90 CAPSULE | Refills: 0 | OUTPATIENT
Start: 2025-08-07

## 2025-08-25 ENCOUNTER — OFFICE VISIT (OUTPATIENT)
Dept: PSYCHIATRY | Facility: CLINIC | Age: 40
End: 2025-08-25
Payer: COMMERCIAL

## 2025-08-25 VITALS
OXYGEN SATURATION: 100 % | WEIGHT: 140.8 LBS | DIASTOLIC BLOOD PRESSURE: 81 MMHG | HEART RATE: 104 BPM | SYSTOLIC BLOOD PRESSURE: 118 MMHG | BODY MASS INDEX: 24.04 KG/M2 | HEIGHT: 64 IN

## 2025-08-25 DIAGNOSIS — F90.2 ATTENTION DEFICIT HYPERACTIVITY DISORDER (ADHD), COMBINED TYPE: Primary | ICD-10-CM

## 2025-08-25 DIAGNOSIS — F33.41 MAJOR DEPRESSIVE DISORDER, RECURRENT EPISODE, IN PARTIAL REMISSION: ICD-10-CM

## 2025-08-25 DIAGNOSIS — F41.1 GAD (GENERALIZED ANXIETY DISORDER): ICD-10-CM

## 2025-08-25 PROCEDURE — 99214 OFFICE O/P EST MOD 30 MIN: CPT | Performed by: PSYCHIATRY & NEUROLOGY

## 2025-08-25 RX ORDER — LORAZEPAM 2 MG/1
2 TABLET ORAL DAILY PRN
Qty: 10 TABLET | Refills: 1 | Status: SHIPPED | OUTPATIENT
Start: 2025-09-10

## 2025-08-25 RX ORDER — DEXTROAMPHETAMINE SACCHARATE, AMPHETAMINE ASPARTATE, DEXTROAMPHETAMINE SULFATE AND AMPHETAMINE SULFATE 5; 5; 5; 5 MG/1; MG/1; MG/1; MG/1
20 TABLET ORAL
Qty: 30 TABLET | Refills: 0 | Status: SHIPPED | OUTPATIENT
Start: 2025-09-12

## 2025-08-25 RX ORDER — HYDROXYZINE HYDROCHLORIDE 25 MG/1
25 TABLET, FILM COATED ORAL
Qty: 90 TABLET | Refills: 0 | Status: SHIPPED | OUTPATIENT
Start: 2025-08-25

## 2025-08-25 RX ORDER — DULOXETIN HYDROCHLORIDE 60 MG/1
120 CAPSULE, DELAYED RELEASE ORAL DAILY
Qty: 180 CAPSULE | Refills: 0 | Status: SHIPPED | OUTPATIENT
Start: 2025-08-25

## 2025-08-25 RX ORDER — DEXTROAMPHETAMINE SACCHARATE, AMPHETAMINE ASPARTATE, DEXTROAMPHETAMINE SULFATE AND AMPHETAMINE SULFATE 7.5; 7.5; 7.5; 7.5 MG/1; MG/1; MG/1; MG/1
30 TABLET ORAL EVERY MORNING
Qty: 30 TABLET | Refills: 0 | Status: SHIPPED | OUTPATIENT
Start: 2025-09-12

## 2025-08-27 ENCOUNTER — OFFICE VISIT (OUTPATIENT)
Dept: FAMILY MEDICINE CLINIC | Facility: CLINIC | Age: 40
End: 2025-08-27
Payer: COMMERCIAL

## 2025-08-27 VITALS
TEMPERATURE: 97.9 F | DIASTOLIC BLOOD PRESSURE: 77 MMHG | HEIGHT: 64 IN | OXYGEN SATURATION: 100 % | BODY MASS INDEX: 23.8 KG/M2 | WEIGHT: 139.4 LBS | SYSTOLIC BLOOD PRESSURE: 118 MMHG | HEART RATE: 114 BPM

## 2025-08-27 DIAGNOSIS — Z13.220 SCREENING FOR LIPID DISORDERS: ICD-10-CM

## 2025-08-27 DIAGNOSIS — G89.29 CHRONIC LEFT SHOULDER PAIN: ICD-10-CM

## 2025-08-27 DIAGNOSIS — M25.512 CHRONIC LEFT SHOULDER PAIN: ICD-10-CM

## 2025-08-27 DIAGNOSIS — Z00.00 ANNUAL PHYSICAL EXAM: Primary | ICD-10-CM

## 2025-08-27 DIAGNOSIS — Z86.73 HISTORY OF STROKE: ICD-10-CM

## 2025-08-27 DIAGNOSIS — Z13.29 SCREENING FOR THYROID DISORDER: ICD-10-CM

## 2025-08-27 LAB
ALBUMIN SERPL-MCNC: 4.7 G/DL (ref 3.5–5.2)
ALBUMIN/GLOB SERPL: 1.6 G/DL
ALP SERPL-CCNC: 54 U/L (ref 39–117)
ALT SERPL W P-5'-P-CCNC: 12 U/L (ref 1–33)
ANION GAP SERPL CALCULATED.3IONS-SCNC: 14.5 MMOL/L (ref 5–15)
AST SERPL-CCNC: 23 U/L (ref 1–32)
BILIRUB SERPL-MCNC: 0.4 MG/DL (ref 0–1.2)
BUN SERPL-MCNC: 15 MG/DL (ref 6–20)
BUN/CREAT SERPL: 13.5 (ref 7–25)
CALCIUM SPEC-SCNC: 10 MG/DL (ref 8.6–10.5)
CHLORIDE SERPL-SCNC: 103 MMOL/L (ref 98–107)
CHOLEST SERPL-MCNC: 193 MG/DL (ref 0–200)
CO2 SERPL-SCNC: 23.5 MMOL/L (ref 22–29)
CREAT SERPL-MCNC: 1.11 MG/DL (ref 0.57–1)
DEPRECATED RDW RBC AUTO: 41 FL (ref 37–54)
EGFRCR SERPLBLD CKD-EPI 2021: 63.8 ML/MIN/1.73
ERYTHROCYTE [DISTWIDTH] IN BLOOD BY AUTOMATED COUNT: 12.1 % (ref 12.3–15.4)
GLOBULIN UR ELPH-MCNC: 3 GM/DL
GLUCOSE SERPL-MCNC: 73 MG/DL (ref 65–99)
HCT VFR BLD AUTO: 37.1 % (ref 34–46.6)
HDLC SERPL-MCNC: 96 MG/DL (ref 40–60)
HGB BLD-MCNC: 11.9 G/DL (ref 12–15.9)
LDLC SERPL CALC-MCNC: 88 MG/DL (ref 0–100)
LDLC/HDLC SERPL: 0.91 {RATIO}
MCH RBC QN AUTO: 29.8 PG (ref 26.6–33)
MCHC RBC AUTO-ENTMCNC: 32.1 G/DL (ref 31.5–35.7)
MCV RBC AUTO: 93 FL (ref 79–97)
PLATELET # BLD AUTO: 301 10*3/MM3 (ref 140–450)
PMV BLD AUTO: 11.6 FL (ref 6–12)
POTASSIUM SERPL-SCNC: 3.9 MMOL/L (ref 3.5–5.2)
PROT SERPL-MCNC: 7.7 G/DL (ref 6–8.5)
RBC # BLD AUTO: 3.99 10*6/MM3 (ref 3.77–5.28)
SODIUM SERPL-SCNC: 141 MMOL/L (ref 136–145)
TRIGL SERPL-MCNC: 47 MG/DL (ref 0–150)
TSH SERPL DL<=0.05 MIU/L-ACNC: 0.57 UIU/ML (ref 0.27–4.2)
VLDLC SERPL-MCNC: 9 MG/DL (ref 5–40)
WBC NRBC COR # BLD AUTO: 7.37 10*3/MM3 (ref 3.4–10.8)

## 2025-08-27 PROCEDURE — 80050 GENERAL HEALTH PANEL: CPT | Performed by: NURSE PRACTITIONER

## 2025-08-27 PROCEDURE — 99396 PREV VISIT EST AGE 40-64: CPT | Performed by: NURSE PRACTITIONER

## 2025-08-27 PROCEDURE — 80061 LIPID PANEL: CPT | Performed by: NURSE PRACTITIONER

## 2025-08-27 PROCEDURE — 36415 COLL VENOUS BLD VENIPUNCTURE: CPT | Performed by: NURSE PRACTITIONER

## 2025-08-27 RX ORDER — CYCLOBENZAPRINE HCL 10 MG
10 TABLET ORAL 3 TIMES DAILY PRN
Qty: 90 TABLET | Refills: 2 | Status: SHIPPED | OUTPATIENT
Start: 2025-08-27

## 2025-08-27 RX ORDER — ASPIRIN 81 MG/1
81 TABLET ORAL DAILY
Qty: 90 TABLET | Refills: 3 | Status: SHIPPED | OUTPATIENT
Start: 2025-08-27